# Patient Record
Sex: MALE | Race: WHITE | Employment: FULL TIME | URBAN - METROPOLITAN AREA
[De-identification: names, ages, dates, MRNs, and addresses within clinical notes are randomized per-mention and may not be internally consistent; named-entity substitution may affect disease eponyms.]

---

## 2017-08-04 ENCOUNTER — OFFICE VISIT (OUTPATIENT)
Dept: URGENT CARE | Facility: CLINIC | Age: 42
End: 2017-08-04
Payer: COMMERCIAL

## 2017-08-04 PROCEDURE — 99213 OFFICE O/P EST LOW 20 MIN: CPT

## 2017-08-09 ENCOUNTER — ALLSCRIPTS OFFICE VISIT (OUTPATIENT)
Dept: OTHER | Facility: OTHER | Age: 42
End: 2017-08-09

## 2017-08-09 ENCOUNTER — APPOINTMENT (OUTPATIENT)
Dept: RADIOLOGY | Facility: MEDICAL CENTER | Age: 42
End: 2017-08-09
Payer: COMMERCIAL

## 2017-08-09 ENCOUNTER — TRANSCRIBE ORDERS (OUTPATIENT)
Dept: URGENT CARE | Facility: MEDICAL CENTER | Age: 42
End: 2017-08-09

## 2017-08-09 DIAGNOSIS — R07.81 RIB PAIN: Primary | ICD-10-CM

## 2017-08-09 PROCEDURE — 71101 X-RAY EXAM UNILAT RIBS/CHEST: CPT

## 2017-08-15 ENCOUNTER — ALLSCRIPTS OFFICE VISIT (OUTPATIENT)
Dept: OTHER | Facility: OTHER | Age: 42
End: 2017-08-15

## 2017-08-16 ENCOUNTER — ALLSCRIPTS OFFICE VISIT (OUTPATIENT)
Dept: OTHER | Facility: OTHER | Age: 42
End: 2017-08-16

## 2018-01-09 NOTE — PROGRESS NOTES
Assessment    1  Muscle strain of scapular region, right, initial encounter (840 9) (A21 173I)    Plan  Muscle strain of scapular region, right, initial encounter    · * XR RIBS RIGHT W PA CHEST MIN 3 VIEWS; Status:Active; Requested for:[id number];    · *1 -  [facility] ([facility] )  Co-Management  *  Status: Active  Requested for: 08Tez0623  Care Summary provided  : Yes    Discussion/Summary    Right posterior scapula pain  COntinue NSAID--such as Advil or Ibuprfen  Chief Complaint  Pain under right shoulder blade for about 6 weeks  History of Present Illness  HPI: Pt developed pain of right scapular region 6 weeks ago  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, 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  AFter 5 weeks, the pain is still present  Over the past 2 weeks, he stopped doing this job, however, 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  He has taken advil , Ibuprofen, without relief of symptoms  Review of Systems    Constitutional: no fever or chills, feels well, no tiredness, no recent weight loss or weight gain  Respiratory: no complaints of shortness of breath, no wheezing or cough, no dyspnea on exertion, no orthopnea or PND  Musculoskeletal: Pain posterior scapular region  Integumentary: no complaints of skin rash or lesion, no itching or dry skin, no skin wounds  Neurological: no complaints of headache, no confusion, no numbness or tingling, no dizziness or fainting  Active Problems    1  Acute bronchitis (466 0) (J20 9)   2  Erythema migrans (Lyme disease) (088 81) (A69 20)    Past Medical History    1  History of Arthralgia of wrist, left (719 43) (M25 532)   2  History of Cellulitis (682 9) (L03 90)   3  History of Fracture, metacarpal (815 00) (S62 309A)   4  History of allergic rhinitis (V12 69) (Z87 09)   5  History of bronchitis (V12 69) (Z87 09)   6  History of Pain in joint of left wrist (719 43) (M25 532)   7   Personal history of dislocation of shoulder (V13 59) (Z87 39)   8  History of Rib pain on right side (786 50) (R07 81)   9  History of Ulnar impaction syndrome (148 54) (R65 334)  Active Problems And Past Medical History Reviewed: The active problems and past medical history were reviewed and updated today  Family History  Mother    1  Family history of diabetes mellitus (V18 0) (Z83 3)  Family History    2  Family history of Prostate cancer  Family History Reviewed: The family history was reviewed and updated today  Social History    · History of Former smoker (Q45 01) (Z22 104)   · Never a smoker  The social history was reviewed and updated today  The social history was reviewed and is unchanged  Surgical History    1  History of Hand Repair   2  History of Hand Surgery  Surgical History Reviewed: The surgical history was reviewed and updated today  Current Meds   1  ProAir  (90 Base) MCG/ACT Inhalation Aerosol Solution; INHALE 1 TO 2 PUFFS   EVERY 4 TO 6 HOURS AS NEEDED; Therapy: [id number] to (Complete:18Aug2017)  Requested for: 40Iyb4054; Last   Rx:[id number] Ordered    The medication list was reviewed and updated today  Allergies    1  hydrocodone   2  Hydrocodone-Acetaminophen CAPS    Vitals   Recorded: 09Aug2017 05:38PM   Temperature 96 9 F   Heart Rate 83   Systolic 351   Diastolic 78   Height 5 ft 8 in   Weight 191 lb 8 0 oz   BMI Calculated 29 12   BSA Calculated 2 01   O2 Saturation 100     Physical Exam  Right Shoulder: Appearance: Normal  Tenderness: None  Motor: Normal  Special Tests: negative Painful Arc, negative Drop Arm test and negative Bear Hug test      Constitutional   General appearance: No acute distress, well appearing and well nourished  Eyes   Conjunctiva and lids: No swelling, erythema, or discharge  Pupils and irises: Equal, round and reactive to light  Ears, Nose, Mouth, and Throat   Oropharynx: Normal with no erythema, edema, exudate or lesions  Pulmonary   Respiratory effort: No increased work of breathing or signs of respiratory distress  Auscultation of lungs: Clear to auscultation, equal breath sounds bilaterally, no wheezes, no rales, no rhonci  Cardiovascular   Auscultation of heart: Normal rate and rhythm, normal S1 and S2, without murmurs  Abdomen   Abdomen: Non-tender, no masses  Lymphatic   Palpation of lymph nodes in neck: No lymphadenopathy  Musculoskeletal   Gait and station: Normal     Skin   Skin and subcutaneous tissue: Normal without rashes or lesions  Psychiatric   Orientation to person, place and time: Normal     Mood and affect: Normal          Results/Data  PHQ-9 Adult Depression Screening [id number] 05:41PM User, [employer]     Test Name Result Flag Reference   PHQ-9 Adult Depression Score 0     Over the last two weeks, how often have you been bothered by any of the following problems? Little interest or pleasure in doing things: Not at all - 0  Feeling down, depressed, or hopeless: Not at all - 0  Trouble falling or staying asleep, or sleeping too much: Not at all - 0  Feeling tired or having little energy: Not at all - 0  Poor appetite or over eating: Not at all - 0  Feeling bad about yourself - or that you are a failure or have let yourself or your family down: Not at all - 0  Trouble concentrating on things, such as reading the newspaper or watching television: Not at all - 0  Moving or speaking so slowly that other people could have noticed   Or the opposite -  being so fidgety or restless that you have been moving around a lot more than usual: Not at all - 0  Thoughts that you would be better off dead, or of hurting yourself in some way: Not at all - 0   PHQ-9 Adult Depression Screening Negative     PHQ-9 Difficulty Level Not difficult at all     PHQ-9 Severity No Depression         Signatures   Electronically signed by : Modesta De Santiago NP; Aug  9 2017  5:52PM EST                       (Author)    Electronically signed by : Tiffany Saucedo MD; Aug 10 2017  7:01AM EST                       (Co-author)

## 2018-01-13 VITALS
BODY MASS INDEX: 28.97 KG/M2 | OXYGEN SATURATION: 98 % | HEIGHT: 68 IN | SYSTOLIC BLOOD PRESSURE: 122 MMHG | HEART RATE: 62 BPM | DIASTOLIC BLOOD PRESSURE: 84 MMHG | WEIGHT: 191.13 LBS | TEMPERATURE: 97.9 F

## 2018-01-13 VITALS
HEIGHT: 68 IN | HEART RATE: 83 BPM | SYSTOLIC BLOOD PRESSURE: 120 MMHG | WEIGHT: 191.5 LBS | TEMPERATURE: 96.9 F | DIASTOLIC BLOOD PRESSURE: 78 MMHG | OXYGEN SATURATION: 100 % | BODY MASS INDEX: 29.02 KG/M2

## 2018-01-14 VITALS
HEART RATE: 57 BPM | DIASTOLIC BLOOD PRESSURE: 88 MMHG | BODY MASS INDEX: 29.03 KG/M2 | WEIGHT: 191.56 LBS | HEIGHT: 68 IN | SYSTOLIC BLOOD PRESSURE: 128 MMHG

## 2018-01-15 NOTE — RESULT NOTES
Verified Results  (1) LYME ANTIBODY PROFILE W/REFLEX TO WESTERN BLOT [id number] 08:53AM Megan Andres Order Number: [id number]     Test Name Result Flag Reference   LYME IGG 0 27  0 00-0 79   NEGATIVE(0 00-0 79)-Absence of detectable Borrelia IgG Antibodies  A negative result does not exclude the possibility of Borrelia infection  If early Lyme disease is suspected,a second sample should be collected & tested 4 weeks after initial testing  LYME IGM 1 92 H 0 00-0 79   POSITIVE (> or = 1 20) - Presence of Borrelia IgM Antibodies  Current testing guidelines recommend that all positive samples be supplemented by further testing  Sample forwarded to reference lab for western blot assay         Discussion/Summary   Jennifer is positive for exposure to lyme waiting for the western blot for confirmation continue with the doxycyline

## 2018-01-16 NOTE — RESULT NOTES
Verified Results  (1) LYME ANTIBODY PROFILE W/REFLEX TO WESTERN BLOT [id number] 08:53AM Marc Vinson Order Number: [id number]     Test Name Result Flag Reference   LYME IGG 0 27  0 00-0 79   NEGATIVE(0 00-0 79)-Absence of detectable Borrelia IgG Antibodies  A negative result does not exclude the possibility of Borrelia infection  If early Lyme disease is suspected,a second sample should be collected & tested 4 weeks after initial testing  LYME IGM 1 92 H 0 00-0 79   POSITIVE (> or = 1 20) - Presence of Borrelia IgM Antibodies  Current testing guidelines recommend that all positive samples be supplemented by further testing  Sample forwarded to reference lab for western blot assay  LYME 18 KD IGG Absent     LYME 23 KD IGG Absent     LYME 28 KD IGG Absent     LYME 30 KD IGG Absent     LYME 39 KD IGG Absent     LYME 39 KD IGM Absent     LYME 41 KD IGG Present A    LYME 45 KD IGG Absent     LYME 58 KD IGG Absent     LYME 66 KD IGG Absent     LYME 93 KD IGG Absent     LYME 23 KD IGM Present A    LYME 41 KD IGM Absent     LYME IGG WB INTERP  Negative     Positive: 5 of the following                               Borrelia-specific bands:                               18,23,28,30,39,41,45,58,                               66, and 93  Negative: No bands or banding                               patterns which do not                               meet positive criteria  LYME IGM WB INTERP  Negative     Note: An equivocal or positive EIA result followed by a negativeWestern Blot result is considered NEGATIVE  An equivocal or positiveEIA result followed by a positive Western Blot is considered POSITIVEby the CDC  Positive: 2 of the following bands: 23,39 or 41Negative: No bands or banding patterns which do not meet positivecriteria  Criteria for positivity are those recommended by CDC/ASTPHLD p23=Osp C, x48=kcayiabcmWzab:Sera from individuals with the following may cross react in theLyme Western Blot assays: other spirochetal diseases (periodontaldisease, leptospirosis, relapsing fever, yaws, and pinta);connective autoimmune (Rheumatoid Arthritis and Systemic LupusErythematosus and also individuals with Antinuclear Antibody);other infections COFFEE [facility] Spotted Fever; Ar-Barr Virus,and Cytomegalovirus)  Performed at:  [address]  [id number]  : Minal Josue MD, Phone:  [phone]       Discussion/Summary   Thest western blot is negative   I would suggest recheck in 4 weeks so far the Lyme is negative overall would complete the antibiotics

## 2018-01-20 ENCOUNTER — OFFICE VISIT (OUTPATIENT)
Dept: URGENT CARE | Facility: CLINIC | Age: 43
End: 2018-01-20
Payer: COMMERCIAL

## 2018-01-20 PROCEDURE — G0382 LEV 3 HOSP TYPE B ED VISIT: HCPCS

## 2018-01-21 NOTE — PROGRESS NOTES
Assessment   1  Pharyngitis (462) (J02 9)    Plan   Pharyngitis    · Start: Amoxicillin 500 MG Oral Capsule; TAKE 1 CAPSULE 3 TIMES DAILY   · Start: ProAir  (90 Base) MCG/ACT Inhalation Aerosol Solution; INHALE 1 PUFFS    4 times daily    Discussion/Summary   Discussion Summary:    I discussed pharyngitis and the use of amoxicillin patient requests ProAir being refilled patient is to see his family doctor in 1-2 days if not improved  Medication Side Effects Reviewed: Possible side effects of new medications were reviewed with the patient/guardian today  Understands and agrees with treatment plan: The treatment plan was reviewed with the patient/guardian  The patient/guardian understands and agrees with the treatment plan    Counseling Documentation With Imm: The patient was counseled regarding instructions for management,-- prognosis  Follow Up Instructions: Follow Up with your Primary Care Provider in 1-2 days  If your symptoms worsen, go to the nearest [place] [address] Emergency Department  Chief Complaint   1  Sore Throat  Chief Complaint Free Text Note Form: Pt c/o a sore throat for two days  History of Present Illness   HPI: Patient is a 45-year-old male with sore throat for approximately 2 days  He has intermittent chills but no fever per se 2 weeks ago he sustained a chest cold when he used his albuterol  He has a history of exercise-induced asthma  He has no ear pain  He has mild cough  Hospital Based Practices Required Assessment:      Pain Assessment      the patient states they have pain  The pain is located in the Throat  (on a scale of 0 to 10, the patient rates the pain at 4 )      Abuse And Domestic Violence Screen       Yes, the patient is safe at home  -- The patient states no one is hurting them  Depression And Suicide Screen  No, the patient has not had thoughts of hurting themself  No, the patient has not felt depressed in the past 7 days        Prefered Language is  [place]  Primary Language is  English  Readiness To Learn: Receptive  Barriers To Learning: none  Education Completed: disease/condition,-- medications-- and-- treatment/procedure      Teaching Method: verbal      Person Taught: patient      Evaluation Of Learning: verbalized/demonstrated understanding      Review of Systems   Focused-Male:      Constitutional: feeling poorly,-- chills-- and-- feeling tired, but-- no fever  ENT: sore throat, but-- no earache,-- no nosebleeds,-- no hearing loss,-- no nasal discharge-- and-- no hoarseness  Cardiovascular: no complaints of slow or fast heart rate, no chest pain, no palpitations, no leg claudication or lower extremity edema  Respiratory: cough-- and-- wheezing, but-- no shortness of breath,-- no orthopnea,-- no shortness of breath during exertion-- and-- no PND  Gastrointestinal: no abdominal pain,-- no nausea,-- no vomiting,-- no constipation,-- no diarrhea-- and-- no blood in stools  Musculoskeletal: no complaints of arthralgia, no myalgia, no joint swelling or stiffness, no limb pain or swelling  Integumentary: no complaints of skin rash or lesion, no itching or dry skin, no skin wounds  Neurological: no complaints of headache, no confusion, no numbness or tingling, no dizziness or fainting  ROS Reviewed:    ROS reviewed  Active Problems   1  Acute bronchitis (466 0) (J20 9)  2  Anal itching (698 0) (L29 0)  3  Erythema migrans (Lyme disease) (088 81) (A69 20)  4  Muscle strain of scapular region, right, initial encounter (840 9) (S46 911A)  5  Neuropathy (355 9) (G62 9)  6  Right shoulder pain (719 41) (M25 511)    Past Medical History   1  History of Arthralgia of wrist, left (719 43) (M25 532)  2  History of Cellulitis (682 9) (L03 90)  3  History of Fracture, metacarpal (815 00) (S62 309A)  4  History of allergic rhinitis (V12 69) (Z87 09)  5  History of bronchitis (V12 69) (Z87 09)  6  History of Pain in joint of left wrist (719 43) (M22 532)  7  Personal history of dislocation of shoulder (V13 59) (Z87 39)  8  History of Rib pain on right side (786 50) (R07 81)  9  History of Ulnar impaction syndrome (911 21) (Y94 169)  Active Problems And Past Medical History Reviewed: The active problems and past medical history were reviewed and updated today  Family History   Mother   1  Family history of diabetes mellitus (V18 0) (Z83 3)  Family History   2  Family history of Prostate cancer  Family History Reviewed: The family history was reviewed and updated today  Social History    · History of Former smoker (D05 33) (D23 076)   · Never a smoker  Social History Reviewed: The social history was reviewed and updated today  Surgical History   1  History of Hand Repair  2  History of Hand Surgery  Surgical History Reviewed: The surgical history was reviewed and updated today  Allergies   1  hydrocodone  2  Hydrocodone-Acetaminophen CAPS    Vitals   Signs   Recorded: 20Jan2018 09:25AM   Temperature: 98 F  Heart Rate: 69  Respiration: 18  Systolic: 208  Diastolic: 68  O2 Saturation: 97    Physical Exam        Constitutional      General appearance: No acute distress, well appearing and well nourished  Eyes      Conjunctiva and lids: No swelling, erythema, or discharge  Ears, Nose, Mouth, and Throat      External inspection of ears and nose: Normal        Otoscopic examination: Tympanic membrance translucent with normal light reflex  Canals patent without erythema  Nasal mucosa, septum, and turbinates: Normal without edema or erythema  Oropharynx: Abnormal  -- anterior pillars and uvula are reddened and injected without exudate retropharynx shows lymphoid hypertrophy with postnasal drip  Pulmonary      Auscultation of lungs: Abnormal  -- occasional rhonchi scattered at both bases        Cardiovascular      Auscultation of heart: Normal rate and rhythm, normal S1 and S2, without murmurs  Skin      Skin and subcutaneous tissue: Normal without rashes or lesions         Psychiatric      Orientation to person, place and time: Normal        Mood and affect: Normal        Signatures    Electronically signed by : Bony Bond DO; Jan 20 2018  9:42AM EST                       (Author)     Electronically signed by : Bony Bond DO; Jan 20 2018  3:46PM EST                       (Author)

## 2018-01-23 VITALS
SYSTOLIC BLOOD PRESSURE: 134 MMHG | HEART RATE: 69 BPM | DIASTOLIC BLOOD PRESSURE: 68 MMHG | OXYGEN SATURATION: 97 % | RESPIRATION RATE: 18 BRPM | TEMPERATURE: 98 F

## 2019-12-05 ENCOUNTER — OFFICE VISIT (OUTPATIENT)
Dept: FAMILY MEDICINE CLINIC | Facility: CLINIC | Age: 44
End: 2019-12-05
Payer: COMMERCIAL

## 2019-12-05 VITALS
RESPIRATION RATE: 16 BRPM | DIASTOLIC BLOOD PRESSURE: 78 MMHG | SYSTOLIC BLOOD PRESSURE: 128 MMHG | OXYGEN SATURATION: 98 % | BODY MASS INDEX: 34.31 KG/M2 | TEMPERATURE: 98.5 F | HEIGHT: 68 IN | HEART RATE: 56 BPM | WEIGHT: 226.4 LBS

## 2019-12-05 DIAGNOSIS — Z13.1 SCREENING FOR DIABETES MELLITUS: ICD-10-CM

## 2019-12-05 DIAGNOSIS — Z00.00 HEALTH MAINTENANCE EXAMINATION: Primary | ICD-10-CM

## 2019-12-05 DIAGNOSIS — M95.0 ACQUIRED NASAL DEFORMITY EXCLUDING DEVIATED SEPTUM: ICD-10-CM

## 2019-12-05 DIAGNOSIS — J45.990 EXERCISE-INDUCED ASTHMA: ICD-10-CM

## 2019-12-05 DIAGNOSIS — Z13.220 NEED FOR LIPID SCREENING: ICD-10-CM

## 2019-12-05 PROCEDURE — 99396 PREV VISIT EST AGE 40-64: CPT | Performed by: FAMILY MEDICINE

## 2019-12-05 RX ORDER — CETIRIZINE HYDROCHLORIDE 10 MG/1
10 TABLET ORAL DAILY
COMMUNITY
End: 2021-12-03

## 2019-12-05 RX ORDER — DIPHENHYDRAMINE HCL 25 MG
25 TABLET ORAL EVERY 6 HOURS PRN
COMMUNITY
End: 2021-12-03

## 2019-12-05 RX ORDER — ALBUTEROL SULFATE 90 UG/1
2 AEROSOL, METERED RESPIRATORY (INHALATION) EVERY 6 HOURS PRN
Qty: 1 INHALER | Refills: 5 | Status: SHIPPED | OUTPATIENT
Start: 2019-12-05 | End: 2021-12-03 | Stop reason: SDUPTHER
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
## 2019-12-05 NOTE — PROGRESS NOTES
BMI Counseling: Body mass index is 34 42 kg/m²  The BMI is above normal  Nutrition recommendations include 3-5 servings of fruits/vegetables daily  Exercise recommendations include exercising 3-5 times per week

## 2019-12-07 ENCOUNTER — LAB (OUTPATIENT)
Dept: LAB | Facility: HOSPITAL | Age: 44
End: 2019-12-07
Payer: COMMERCIAL

## 2019-12-07 DIAGNOSIS — Z13.1 SCREENING FOR DIABETES MELLITUS: ICD-10-CM

## 2019-12-07 DIAGNOSIS — Z13.220 NEED FOR LIPID SCREENING: ICD-10-CM

## 2019-12-07 LAB
ALBUMIN SERPL BCP-MCNC: 4.6 G/DL (ref 3.5–5.7)
ALP SERPL-CCNC: 46 U/L (ref 40–150)
ALT SERPL W P-5'-P-CCNC: 28 U/L (ref 7–52)
ANION GAP SERPL CALCULATED.3IONS-SCNC: 5 MMOL/L (ref 4–13)
AST SERPL W P-5'-P-CCNC: 26 U/L (ref 13–39)
BILIRUB SERPL-MCNC: 0.7 MG/DL (ref 0.2–1)
BUN SERPL-MCNC: 20 MG/DL (ref 7–25)
CALCIUM SERPL-MCNC: 9.2 MG/DL (ref 8.6–10.5)
CHLORIDE SERPL-SCNC: 105 MMOL/L (ref 98–107)
CHOLEST SERPL-MCNC: 154 MG/DL (ref 0–200)
CO2 SERPL-SCNC: 31 MMOL/L (ref 21–31)
CREAT SERPL-MCNC: 1.06 MG/DL (ref 0.7–1.3)
GFR SERPL CREATININE-BSD FRML MDRD: 86 ML/MIN/1.73SQ M
GLUCOSE P FAST SERPL-MCNC: 86 MG/DL (ref 65–99)
HDLC SERPL-MCNC: 48 MG/DL
LDLC SERPL CALC-MCNC: 96 MG/DL (ref 0–100)
NONHDLC SERPL-MCNC: 106 MG/DL
POTASSIUM SERPL-SCNC: 4 MMOL/L (ref 3.5–5.5)
PROT SERPL-MCNC: 6.7 G/DL (ref 6.4–8.9)
SODIUM SERPL-SCNC: 141 MMOL/L (ref 134–143)
TRIGL SERPL-MCNC: 51 MG/DL (ref 44–166)

## 2019-12-07 PROCEDURE — 80061 LIPID PANEL: CPT

## 2019-12-07 PROCEDURE — 80053 COMPREHEN METABOLIC PANEL: CPT

## 2019-12-07 PROCEDURE — 36415 COLL VENOUS BLD VENIPUNCTURE: CPT

## 2020-02-04 ENCOUNTER — HOSPITAL ENCOUNTER (OUTPATIENT)
Dept: CT IMAGING | Facility: HOSPITAL | Age: 45
Discharge: HOME | End: 2020-02-04
Payer: COMMERCIAL

## 2020-02-04 DIAGNOSIS — J34.89 NASAL OBSTRUCTION: ICD-10-CM

## 2020-02-04 DIAGNOSIS — J34.2 NASAL SEPTAL DEVIATION: ICD-10-CM

## 2020-02-04 DIAGNOSIS — J34.89 SINUS PRESSURE: ICD-10-CM

## 2020-02-04 DIAGNOSIS — J34.3 NASAL TURBINATE HYPERTROPHY: ICD-10-CM

## 2020-02-04 DIAGNOSIS — R51.9 FACIAL PAIN: ICD-10-CM

## 2020-02-04 DIAGNOSIS — M95.0 SADDLE NOSE DEFORMITY, ACQUIRED: ICD-10-CM

## 2020-02-04 PROCEDURE — 70486 CT MAXILLOFACIAL W/O DYE: CPT

## 2020-04-17 ENCOUNTER — TELEMEDICINE (OUTPATIENT)
Dept: FAMILY MEDICINE CLINIC | Facility: CLINIC | Age: 45
End: 2020-04-17
Payer: COMMERCIAL

## 2020-04-17 VITALS — TEMPERATURE: 99.3 F

## 2020-04-17 DIAGNOSIS — Z20.828 EXPOSURE TO SARS-ASSOCIATED CORONAVIRUS: ICD-10-CM

## 2020-04-17 DIAGNOSIS — Z20.828 EXPOSURE TO SARS-ASSOCIATED CORONAVIRUS: Primary | ICD-10-CM

## 2020-04-17 DIAGNOSIS — R50.9 COUGH WITH FEVER: ICD-10-CM

## 2020-04-17 DIAGNOSIS — R05.9 COUGH WITH FEVER: ICD-10-CM

## 2020-04-17 PROBLEM — Z13.220 NEED FOR LIPID SCREENING: Status: RESOLVED | Noted: 2019-12-05 | Resolved: 2020-04-17

## 2020-04-17 PROBLEM — Z13.1 SCREENING FOR DIABETES MELLITUS: Status: RESOLVED | Noted: 2019-12-05 | Resolved: 2020-04-17

## 2020-04-17 PROBLEM — Z00.00 HEALTH MAINTENANCE EXAMINATION: Status: RESOLVED | Noted: 2019-12-05 | Resolved: 2020-04-17

## 2020-04-17 PROCEDURE — 99214 OFFICE O/P EST MOD 30 MIN: CPT | Performed by: FAMILY MEDICINE

## 2020-04-17 PROCEDURE — 87635 SARS-COV-2 COVID-19 AMP PRB: CPT

## 2020-04-18 LAB — SARS-COV-2 RNA SPEC QL NAA+PROBE: DETECTED

## 2020-04-20 ENCOUNTER — TELEMEDICINE (OUTPATIENT)
Dept: FAMILY MEDICINE CLINIC | Facility: CLINIC | Age: 45
End: 2020-04-20
Payer: COMMERCIAL

## 2020-04-20 DIAGNOSIS — U07.1 COVID-19: Primary | ICD-10-CM

## 2020-04-20 PROCEDURE — 99212 OFFICE O/P EST SF 10 MIN: CPT | Performed by: FAMILY MEDICINE

## 2020-04-22 ENCOUNTER — TELEMEDICINE (OUTPATIENT)
Dept: FAMILY MEDICINE CLINIC | Facility: CLINIC | Age: 45
End: 2020-04-22
Payer: COMMERCIAL

## 2020-04-22 VITALS — HEIGHT: 68 IN | BODY MASS INDEX: 34.71 KG/M2 | WEIGHT: 229 LBS

## 2020-04-22 DIAGNOSIS — U07.1 COVID-19: Primary | ICD-10-CM

## 2020-04-22 PROCEDURE — 99213 OFFICE O/P EST LOW 20 MIN: CPT | Performed by: FAMILY MEDICINE

## 2020-04-24 ENCOUNTER — TELEMEDICINE (OUTPATIENT)
Dept: FAMILY MEDICINE CLINIC | Facility: CLINIC | Age: 45
End: 2020-04-24
Payer: COMMERCIAL

## 2020-04-24 DIAGNOSIS — U07.1 COVID-19: Primary | ICD-10-CM

## 2020-04-24 PROCEDURE — 99213 OFFICE O/P EST LOW 20 MIN: CPT | Performed by: PHYSICIAN ASSISTANT

## 2020-05-07 ENCOUNTER — TELEMEDICINE (OUTPATIENT)
Dept: FAMILY MEDICINE CLINIC | Facility: CLINIC | Age: 45
End: 2020-05-07
Payer: COMMERCIAL

## 2020-05-07 VITALS — WEIGHT: 229 LBS | HEIGHT: 68 IN | BODY MASS INDEX: 34.71 KG/M2 | HEART RATE: 98 BPM

## 2020-05-07 DIAGNOSIS — J45.990 EXERCISE-INDUCED ASTHMA: Primary | ICD-10-CM

## 2020-05-07 DIAGNOSIS — M95.0 ACQUIRED NASAL DEFORMITY EXCLUDING DEVIATED SEPTUM: ICD-10-CM

## 2020-05-07 PROBLEM — R05.9 COUGH WITH FEVER: Status: RESOLVED | Noted: 2020-04-17 | Resolved: 2020-05-07

## 2020-05-07 PROBLEM — R50.9 COUGH WITH FEVER: Status: RESOLVED | Noted: 2020-04-17 | Resolved: 2020-05-07

## 2020-05-07 PROCEDURE — 99213 OFFICE O/P EST LOW 20 MIN: CPT | Performed by: FAMILY MEDICINE

## 2020-05-14 ENCOUNTER — TELEMEDICINE (OUTPATIENT)
Dept: FAMILY MEDICINE CLINIC | Facility: CLINIC | Age: 45
End: 2020-05-14
Payer: COMMERCIAL

## 2020-05-14 ENCOUNTER — TRANSCRIBE ORDERS (OUTPATIENT)
Dept: LAB | Facility: CLINIC | Age: 45
End: 2020-05-14

## 2020-05-14 ENCOUNTER — APPOINTMENT (OUTPATIENT)
Dept: LAB | Facility: CLINIC | Age: 45
End: 2020-05-14
Payer: COMMERCIAL

## 2020-05-14 VITALS — WEIGHT: 229 LBS | BODY MASS INDEX: 34.71 KG/M2 | HEIGHT: 68 IN

## 2020-05-14 DIAGNOSIS — J45.990 EXERCISE-INDUCED ASTHMA: ICD-10-CM

## 2020-05-14 DIAGNOSIS — J34.3 NASAL TURBINATE HYPERTROPHY: ICD-10-CM

## 2020-05-14 DIAGNOSIS — J34.89 NASAL OBSTRUCTION: ICD-10-CM

## 2020-05-14 DIAGNOSIS — M95.0 ACQUIRED NASAL DEFORMITY EXCLUDING DEVIATED SEPTUM: ICD-10-CM

## 2020-05-14 DIAGNOSIS — J34.2 NASAL SEPTAL DEVIATION: ICD-10-CM

## 2020-05-14 DIAGNOSIS — M95.0 ACQUIRED NASAL DEFORMITY EXCLUDING DEVIATED SEPTUM: Primary | ICD-10-CM

## 2020-05-14 LAB
ANION GAP SERPL CALCULATED.3IONS-SCNC: 8 MMOL/L (ref 4–13)
BASOPHILS # BLD AUTO: 0.04 THOUSANDS/ΜL (ref 0–0.1)
BASOPHILS NFR BLD AUTO: 1 % (ref 0–1)
BUN SERPL-MCNC: 20 MG/DL (ref 5–25)
CALCIUM SERPL-MCNC: 8.7 MG/DL (ref 8.3–10.1)
CHLORIDE SERPL-SCNC: 104 MMOL/L (ref 100–108)
CO2 SERPL-SCNC: 27 MMOL/L (ref 21–32)
CREAT SERPL-MCNC: 1.05 MG/DL (ref 0.6–1.3)
EOSINOPHIL # BLD AUTO: 0.25 THOUSAND/ΜL (ref 0–0.61)
EOSINOPHIL NFR BLD AUTO: 7 % (ref 0–6)
ERYTHROCYTE [DISTWIDTH] IN BLOOD BY AUTOMATED COUNT: 12.9 % (ref 11.6–15.1)
GFR SERPL CREATININE-BSD FRML MDRD: 87 ML/MIN/1.73SQ M
GLUCOSE SERPL-MCNC: 95 MG/DL (ref 65–140)
HCT VFR BLD AUTO: 42 % (ref 36.5–49.3)
HGB BLD-MCNC: 14.4 G/DL (ref 12–17)
IMM GRANULOCYTES # BLD AUTO: 0.01 THOUSAND/UL (ref 0–0.2)
IMM GRANULOCYTES NFR BLD AUTO: 0 % (ref 0–2)
LYMPHOCYTES # BLD AUTO: 0.96 THOUSANDS/ΜL (ref 0.6–4.47)
LYMPHOCYTES NFR BLD AUTO: 29 % (ref 14–44)
MCH RBC QN AUTO: 29 PG (ref 26.8–34.3)
MCHC RBC AUTO-ENTMCNC: 34.3 G/DL (ref 31.4–37.4)
MCV RBC AUTO: 85 FL (ref 82–98)
MONOCYTES # BLD AUTO: 0.46 THOUSAND/ΜL (ref 0.17–1.22)
MONOCYTES NFR BLD AUTO: 14 % (ref 4–12)
NEUTROPHILS # BLD AUTO: 1.64 THOUSANDS/ΜL (ref 1.85–7.62)
NEUTS SEG NFR BLD AUTO: 49 % (ref 43–75)
NRBC BLD AUTO-RTO: 0 /100 WBCS
PLATELET # BLD AUTO: 169 THOUSANDS/UL (ref 149–390)
PMV BLD AUTO: 10.4 FL (ref 8.9–12.7)
POTASSIUM SERPL-SCNC: 3.9 MMOL/L (ref 3.5–5.3)
RBC # BLD AUTO: 4.96 MILLION/UL (ref 3.88–5.62)
SODIUM SERPL-SCNC: 139 MMOL/L (ref 136–145)
WBC # BLD AUTO: 3.36 THOUSAND/UL (ref 4.31–10.16)

## 2020-05-14 PROCEDURE — 36415 COLL VENOUS BLD VENIPUNCTURE: CPT

## 2020-05-14 PROCEDURE — 99215 OFFICE O/P EST HI 40 MIN: CPT | Performed by: FAMILY MEDICINE

## 2020-05-14 PROCEDURE — 80048 BASIC METABOLIC PNL TOTAL CA: CPT

## 2020-05-14 PROCEDURE — 85025 COMPLETE CBC W/AUTO DIFF WBC: CPT

## 2020-05-14 PROCEDURE — U0003 INFECTIOUS AGENT DETECTION BY NUCLEIC ACID (DNA OR RNA); SEVERE ACUTE RESPIRATORY SYNDROME CORONAVIRUS 2 (SARS-COV-2) (CORONAVIRUS DISEASE [COVID-19]), AMPLIFIED PROBE TECHNIQUE, MAKING USE OF HIGH THROUGHPUT TECHNOLOGIES AS DESCRIBED BY CMS-2020-01-R: HCPCS

## 2020-05-15 LAB — SARS-COV-2 RNA RESP QL NAA+PROBE: NEGATIVE

## 2020-05-20 ENCOUNTER — ANESTHESIA EVENT (OUTPATIENT)
Dept: PERIOP | Facility: HOSPITAL | Age: 45
End: 2020-05-20
Payer: COMMERCIAL

## 2020-05-21 ENCOUNTER — HOSPITAL ENCOUNTER (OUTPATIENT)
Facility: HOSPITAL | Age: 45
Setting detail: OUTPATIENT SURGERY
Discharge: HOME | End: 2020-05-21
Attending: OTOLARYNGOLOGY | Admitting: OTOLARYNGOLOGY
Payer: COMMERCIAL

## 2020-05-21 ENCOUNTER — ANESTHESIA (OUTPATIENT)
Dept: PERIOP | Facility: HOSPITAL | Age: 45
End: 2020-05-21
Payer: COMMERCIAL

## 2020-05-21 VITALS
HEIGHT: 68 IN | BODY MASS INDEX: 34.71 KG/M2 | OXYGEN SATURATION: 94 % | HEART RATE: 53 BPM | SYSTOLIC BLOOD PRESSURE: 155 MMHG | WEIGHT: 229 LBS | TEMPERATURE: 97 F | RESPIRATION RATE: 18 BRPM | DIASTOLIC BLOOD PRESSURE: 78 MMHG

## 2020-05-21 DIAGNOSIS — M95.0 SADDLE NOSE DEFORMITY, ACQUIRED: ICD-10-CM

## 2020-05-21 DIAGNOSIS — J34.89 NASAL OBSTRUCTION: Chronic | ICD-10-CM

## 2020-05-21 DIAGNOSIS — J34.2 NASAL SEPTAL DEVIATION: ICD-10-CM

## 2020-05-21 DIAGNOSIS — J34.3 NASAL TURBINATE HYPERTROPHY: ICD-10-CM

## 2020-05-21 DIAGNOSIS — M95.0 ACQUIRED NASAL DEFORMITY EXCLUDING DEVIATED SEPTUM: Primary | ICD-10-CM

## 2020-05-21 PROCEDURE — C1762 CONN TISS, HUMAN(INC FASCIA): HCPCS | Performed by: OTOLARYNGOLOGY

## 2020-05-21 PROCEDURE — 30520 REPAIR OF NASAL SEPTUM: CPT | Performed by: OTOLARYNGOLOGY

## 2020-05-21 PROCEDURE — 30140 RESECT INFERIOR TURBINATE: CPT | Performed by: OTOLARYNGOLOGY

## 2020-05-21 PROCEDURE — 30465 REPAIR NASAL STENOSIS: CPT | Performed by: OTOLARYNGOLOGY

## 2020-05-21 DEVICE — GRAFT COSTAL CARTILAGE MED 30MM X 3CM  10-30MM THCK: Type: IMPLANTABLE DEVICE | Site: NOSE | Status: FUNCTIONAL

## 2020-05-21 RX ORDER — SODIUM CHLORIDE, SODIUM LACTATE, POTASSIUM CHLORIDE, CALCIUM CHLORIDE 600; 310; 30; 20 MG/100ML; MG/100ML; MG/100ML; MG/100ML
75 INJECTION, SOLUTION INTRAVENOUS CONTINUOUS
Status: DISCONTINUED | OUTPATIENT
Start: 2020-05-21 | End: 2020-05-21 | Stop reason: HOSPADM

## 2020-05-21 RX ORDER — GLYCOPYRROLATE 0.2 MG/ML
INJECTION INTRAMUSCULAR; INTRAVENOUS AS NEEDED
Status: DISCONTINUED | OUTPATIENT
Start: 2020-05-21 | End: 2020-05-21 | Stop reason: SURG

## 2020-05-21 RX ORDER — OXYCODONE HYDROCHLORIDE 5 MG/1
5 TABLET ORAL EVERY 4 HOURS PRN
Qty: 10 TABLET | Refills: 0 | Status: SHIPPED | OUTPATIENT
Start: 2020-05-21 | End: 2020-05-31

## 2020-05-21 RX ORDER — HYDROMORPHONE HCL/PF 1 MG/ML
0.2 SYRINGE (ML) INJECTION
Status: DISCONTINUED | OUTPATIENT
Start: 2020-05-21 | End: 2020-05-21 | Stop reason: HOSPADM

## 2020-05-21 RX ORDER — DEXAMETHASONE SODIUM PHOSPHATE 10 MG/ML
INJECTION, SOLUTION INTRAMUSCULAR; INTRAVENOUS AS NEEDED
Status: DISCONTINUED | OUTPATIENT
Start: 2020-05-21 | End: 2020-05-21 | Stop reason: SURG

## 2020-05-21 RX ORDER — FENTANYL CITRATE 50 UG/ML
INJECTION, SOLUTION INTRAMUSCULAR; INTRAVENOUS AS NEEDED
Status: DISCONTINUED | OUTPATIENT
Start: 2020-05-21 | End: 2020-05-21 | Stop reason: SURG

## 2020-05-21 RX ORDER — FENTANYL CITRATE/PF 50 MCG/ML
25 SYRINGE (ML) INJECTION
Status: DISCONTINUED | OUTPATIENT
Start: 2020-05-21 | End: 2020-05-21 | Stop reason: HOSPADM

## 2020-05-21 RX ORDER — GINSENG 100 MG
CAPSULE ORAL AS NEEDED
Status: DISCONTINUED | OUTPATIENT
Start: 2020-05-21 | End: 2020-05-21 | Stop reason: HOSPADM

## 2020-05-21 RX ORDER — OXYCODONE HCL 5 MG/5 ML
5 SOLUTION, ORAL ORAL EVERY 4 HOURS PRN
Status: DISCONTINUED | OUTPATIENT
Start: 2020-05-21 | End: 2020-05-21 | Stop reason: HOSPADM

## 2020-05-21 RX ORDER — LIDOCAINE HYDROCHLORIDE 10 MG/ML
0.5 INJECTION, SOLUTION EPIDURAL; INFILTRATION; INTRACAUDAL; PERINEURAL ONCE AS NEEDED
Status: DISCONTINUED | OUTPATIENT
Start: 2020-05-21 | End: 2020-05-21 | Stop reason: HOSPADM

## 2020-05-21 RX ORDER — PROPOFOL 10 MG/ML
INJECTION, EMULSION INTRAVENOUS AS NEEDED
Status: DISCONTINUED | OUTPATIENT
Start: 2020-05-21 | End: 2020-05-21 | Stop reason: SURG

## 2020-05-21 RX ORDER — ROCURONIUM BROMIDE 10 MG/ML
INJECTION, SOLUTION INTRAVENOUS AS NEEDED
Status: DISCONTINUED | OUTPATIENT
Start: 2020-05-21 | End: 2020-05-21 | Stop reason: SURG

## 2020-05-21 RX ORDER — ONDANSETRON 2 MG/ML
4 INJECTION INTRAMUSCULAR; INTRAVENOUS ONCE AS NEEDED
Status: DISCONTINUED | OUTPATIENT
Start: 2020-05-21 | End: 2020-05-21 | Stop reason: HOSPADM

## 2020-05-21 RX ORDER — MIDAZOLAM HYDROCHLORIDE 2 MG/2ML
INJECTION, SOLUTION INTRAMUSCULAR; INTRAVENOUS AS NEEDED
Status: DISCONTINUED | OUTPATIENT
Start: 2020-05-21 | End: 2020-05-21 | Stop reason: SURG

## 2020-05-21 RX ORDER — LIDOCAINE HYDROCHLORIDE 10 MG/ML
INJECTION, SOLUTION EPIDURAL; INFILTRATION; INTRACAUDAL; PERINEURAL AS NEEDED
Status: DISCONTINUED | OUTPATIENT
Start: 2020-05-21 | End: 2020-05-21 | Stop reason: SURG

## 2020-05-21 RX ORDER — ONDANSETRON 2 MG/ML
INJECTION INTRAMUSCULAR; INTRAVENOUS AS NEEDED
Status: DISCONTINUED | OUTPATIENT
Start: 2020-05-21 | End: 2020-05-21 | Stop reason: SURG

## 2020-05-21 RX ORDER — ACETAMINOPHEN 325 MG/1
650 TABLET ORAL EVERY 6 HOURS PRN
Status: DISCONTINUED | OUTPATIENT
Start: 2020-05-21 | End: 2020-05-21 | Stop reason: HOSPADM

## 2020-05-21 RX ORDER — EPHEDRINE SULFATE 50 MG/ML
INJECTION INTRAVENOUS AS NEEDED
Status: DISCONTINUED | OUTPATIENT
Start: 2020-05-21 | End: 2020-05-21 | Stop reason: SURG

## 2020-05-21 RX ORDER — OXYMETAZOLINE HYDROCHLORIDE 0.05 G/100ML
SPRAY NASAL AS NEEDED
Status: DISCONTINUED | OUTPATIENT
Start: 2020-05-21 | End: 2020-05-21 | Stop reason: HOSPADM

## 2020-05-21 RX ORDER — SUCCINYLCHOLINE/SOD CL,ISO/PF 100 MG/5ML
SYRINGE (ML) INTRAVENOUS AS NEEDED
Status: DISCONTINUED | OUTPATIENT
Start: 2020-05-21 | End: 2020-05-21 | Stop reason: SURG

## 2020-05-21 RX ORDER — LIDOCAINE HYDROCHLORIDE AND EPINEPHRINE BITARTRATE 20; .01 MG/ML; MG/ML
INJECTION, SOLUTION SUBCUTANEOUS AS NEEDED
Status: DISCONTINUED | OUTPATIENT
Start: 2020-05-21 | End: 2020-05-21 | Stop reason: HOSPADM

## 2020-05-21 RX ORDER — NEOSTIGMINE METHYLSULFATE 1 MG/ML
INJECTION INTRAVENOUS AS NEEDED
Status: DISCONTINUED | OUTPATIENT
Start: 2020-05-21 | End: 2020-05-21 | Stop reason: SURG

## 2020-05-21 RX ORDER — HYDROMORPHONE HCL 110MG/55ML
PATIENT CONTROLLED ANALGESIA SYRINGE INTRAVENOUS AS NEEDED
Status: DISCONTINUED | OUTPATIENT
Start: 2020-05-21 | End: 2020-05-21 | Stop reason: SURG

## 2020-05-21 RX ADMIN — EPHEDRINE SULFATE 10 MG: 50 INJECTION, SOLUTION INTRAVENOUS at 12:09

## 2020-05-21 RX ADMIN — OXYCODONE HYDROCHLORIDE 5 MG: 5 SOLUTION ORAL at 16:59

## 2020-05-21 RX ADMIN — NEOSTIGMINE METHYLSULFATE 3 MG: 1 INJECTION INTRAVENOUS at 15:19

## 2020-05-21 RX ADMIN — Medication 100 MG: at 11:48

## 2020-05-21 RX ADMIN — SODIUM CHLORIDE, SODIUM LACTATE, POTASSIUM CHLORIDE, AND CALCIUM CHLORIDE: .6; .31; .03; .02 INJECTION, SOLUTION INTRAVENOUS at 14:05

## 2020-05-21 RX ADMIN — MIDAZOLAM HYDROCHLORIDE 2 MG: 1 INJECTION, SOLUTION INTRAMUSCULAR; INTRAVENOUS at 11:39

## 2020-05-21 RX ADMIN — ROCURONIUM BROMIDE 50 MG: 10 SOLUTION INTRAVENOUS at 12:01

## 2020-05-21 RX ADMIN — HYDROMORPHONE HYDROCHLORIDE 0.5 MG: 2 INJECTION INTRAMUSCULAR; INTRAVENOUS; SUBCUTANEOUS at 15:18

## 2020-05-21 RX ADMIN — PROPOFOL 200 MG: 10 INJECTION, EMULSION INTRAVENOUS at 11:48

## 2020-05-21 RX ADMIN — FENTANYL CITRATE 25 MCG: 50 INJECTION INTRAMUSCULAR; INTRAVENOUS at 16:10

## 2020-05-21 RX ADMIN — SODIUM CHLORIDE, SODIUM LACTATE, POTASSIUM CHLORIDE, AND CALCIUM CHLORIDE: .6; .31; .03; .02 INJECTION, SOLUTION INTRAVENOUS at 11:17

## 2020-05-21 RX ADMIN — FENTANYL CITRATE 100 MCG: 50 INJECTION INTRAMUSCULAR; INTRAVENOUS at 11:48

## 2020-05-21 RX ADMIN — FENTANYL CITRATE 25 MCG: 50 INJECTION INTRAMUSCULAR; INTRAVENOUS at 16:07

## 2020-05-21 RX ADMIN — GLYCOPYRROLATE 0.4 MG: 0.2 INJECTION, SOLUTION INTRAMUSCULAR; INTRAVENOUS at 15:19

## 2020-05-21 RX ADMIN — FENTANYL CITRATE 25 MCG: 50 INJECTION INTRAMUSCULAR; INTRAVENOUS at 16:03

## 2020-05-21 RX ADMIN — DEXAMETHASONE SODIUM PHOSPHATE 4 MG: 10 INJECTION, SOLUTION INTRAMUSCULAR; INTRAVENOUS at 12:01

## 2020-05-21 RX ADMIN — LIDOCAINE HYDROCHLORIDE 50 MG: 10 INJECTION, SOLUTION EPIDURAL; INFILTRATION; INTRACAUDAL; PERINEURAL at 11:48

## 2020-05-21 RX ADMIN — ONDANSETRON 4 MG: 2 INJECTION INTRAMUSCULAR; INTRAVENOUS at 15:17

## 2020-05-21 RX ADMIN — HYDROMORPHONE HYDROCHLORIDE 0.5 MG: 2 INJECTION INTRAMUSCULAR; INTRAVENOUS; SUBCUTANEOUS at 15:23

## 2020-05-29 DIAGNOSIS — M25.522 LEFT ELBOW PAIN: Primary | ICD-10-CM

## 2020-05-29 DIAGNOSIS — M25.532 LEFT WRIST PAIN: ICD-10-CM

## 2020-06-05 ENCOUNTER — OFFICE VISIT (OUTPATIENT)
Dept: OBGYN CLINIC | Facility: CLINIC | Age: 45
End: 2020-06-05
Payer: COMMERCIAL

## 2020-06-05 VITALS
BODY MASS INDEX: 34.71 KG/M2 | SYSTOLIC BLOOD PRESSURE: 129 MMHG | HEIGHT: 68 IN | WEIGHT: 229 LBS | DIASTOLIC BLOOD PRESSURE: 86 MMHG | HEART RATE: 67 BPM

## 2020-06-05 DIAGNOSIS — M25.832 ULNAR ABUTMENT SYNDROME OF LEFT WRIST: ICD-10-CM

## 2020-06-05 DIAGNOSIS — M25.522 LEFT ELBOW PAIN: ICD-10-CM

## 2020-06-05 DIAGNOSIS — M77.12 LATERAL EPICONDYLITIS OF LEFT ELBOW: Primary | ICD-10-CM

## 2020-06-05 DIAGNOSIS — M25.532 LEFT WRIST PAIN: ICD-10-CM

## 2020-06-05 PROCEDURE — 3008F BODY MASS INDEX DOCD: CPT | Performed by: ORTHOPAEDIC SURGERY

## 2020-06-05 PROCEDURE — 1036F TOBACCO NON-USER: CPT | Performed by: ORTHOPAEDIC SURGERY

## 2020-06-05 PROCEDURE — 20551 NJX 1 TENDON ORIGIN/INSJ: CPT | Performed by: ORTHOPAEDIC SURGERY

## 2020-06-05 PROCEDURE — 99203 OFFICE O/P NEW LOW 30 MIN: CPT | Performed by: ORTHOPAEDIC SURGERY

## 2020-06-05 PROCEDURE — 20605 DRAIN/INJ JOINT/BURSA W/O US: CPT | Performed by: ORTHOPAEDIC SURGERY

## 2020-06-05 RX ORDER — LIDOCAINE HYDROCHLORIDE 10 MG/ML
1 INJECTION, SOLUTION INFILTRATION; PERINEURAL
Status: COMPLETED | OUTPATIENT
Start: 2020-06-05 | End: 2020-06-05

## 2020-06-05 RX ORDER — TRIAMCINOLONE ACETONIDE 40 MG/ML
20 INJECTION, SUSPENSION INTRA-ARTICULAR; INTRAMUSCULAR
Status: COMPLETED | OUTPATIENT
Start: 2020-06-05 | End: 2020-06-05

## 2020-06-05 RX ORDER — TRIAMCINOLONE ACETONIDE 40 MG/ML
40 INJECTION, SUSPENSION INTRA-ARTICULAR; INTRAMUSCULAR
Status: COMPLETED | OUTPATIENT
Start: 2020-06-05 | End: 2020-06-05

## 2020-06-05 RX ORDER — LIDOCAINE HYDROCHLORIDE 10 MG/ML
0.5 INJECTION, SOLUTION INFILTRATION; PERINEURAL
Status: COMPLETED | OUTPATIENT
Start: 2020-06-05 | End: 2020-06-05

## 2020-06-05 RX ADMIN — LIDOCAINE HYDROCHLORIDE 0.5 ML: 10 INJECTION, SOLUTION INFILTRATION; PERINEURAL at 08:58

## 2020-06-05 RX ADMIN — TRIAMCINOLONE ACETONIDE 40 MG: 40 INJECTION, SUSPENSION INTRA-ARTICULAR; INTRAMUSCULAR at 08:57

## 2020-06-05 RX ADMIN — TRIAMCINOLONE ACETONIDE 20 MG: 40 INJECTION, SUSPENSION INTRA-ARTICULAR; INTRAMUSCULAR at 08:58

## 2020-06-05 RX ADMIN — LIDOCAINE HYDROCHLORIDE 1 ML: 10 INJECTION, SOLUTION INFILTRATION; PERINEURAL at 08:57

## 2020-06-11 ENCOUNTER — TELEPHONE (OUTPATIENT)
Dept: FAMILY MEDICINE CLINIC | Facility: CLINIC | Age: 45
End: 2020-06-11

## 2020-06-12 ENCOUNTER — TELEPHONE (OUTPATIENT)
Dept: FAMILY MEDICINE CLINIC | Facility: CLINIC | Age: 45
End: 2020-06-12

## 2020-06-15 ENCOUNTER — TELEMEDICINE (OUTPATIENT)
Dept: FAMILY MEDICINE CLINIC | Facility: CLINIC | Age: 45
End: 2020-06-15
Payer: COMMERCIAL

## 2020-06-15 DIAGNOSIS — Z86.16 HISTORY OF 2019 NOVEL CORONAVIRUS DISEASE (COVID-19): Primary | ICD-10-CM

## 2020-06-15 PROCEDURE — 99213 OFFICE O/P EST LOW 20 MIN: CPT | Performed by: FAMILY MEDICINE

## 2020-06-26 ENCOUNTER — OFFICE VISIT (OUTPATIENT)
Dept: URGENT CARE | Facility: CLINIC | Age: 45
End: 2020-06-26
Payer: COMMERCIAL

## 2020-06-26 ENCOUNTER — OFFICE VISIT (OUTPATIENT)
Dept: OBGYN CLINIC | Facility: CLINIC | Age: 45
End: 2020-06-26
Payer: COMMERCIAL

## 2020-06-26 VITALS
HEIGHT: 68 IN | SYSTOLIC BLOOD PRESSURE: 130 MMHG | DIASTOLIC BLOOD PRESSURE: 89 MMHG | HEART RATE: 64 BPM | BODY MASS INDEX: 32.71 KG/M2 | WEIGHT: 215.8 LBS

## 2020-06-26 VITALS
RESPIRATION RATE: 18 BRPM | TEMPERATURE: 98.9 F | DIASTOLIC BLOOD PRESSURE: 98 MMHG | SYSTOLIC BLOOD PRESSURE: 133 MMHG | OXYGEN SATURATION: 98 % | HEART RATE: 83 BPM

## 2020-06-26 DIAGNOSIS — M77.12 LATERAL EPICONDYLITIS OF LEFT ELBOW: Primary | ICD-10-CM

## 2020-06-26 DIAGNOSIS — T16.1XXA FOREIGN BODY OF RIGHT EAR, INITIAL ENCOUNTER: Primary | ICD-10-CM

## 2020-06-26 DIAGNOSIS — M25.832 ULNAR ABUTMENT SYNDROME OF LEFT WRIST: ICD-10-CM

## 2020-06-26 PROCEDURE — 3008F BODY MASS INDEX DOCD: CPT | Performed by: PHYSICIAN ASSISTANT

## 2020-06-26 PROCEDURE — 69200 CLEAR OUTER EAR CANAL: CPT | Performed by: PHYSICIAN ASSISTANT

## 2020-06-26 PROCEDURE — 1036F TOBACCO NON-USER: CPT | Performed by: ORTHOPAEDIC SURGERY

## 2020-06-26 PROCEDURE — 99213 OFFICE O/P EST LOW 20 MIN: CPT | Performed by: PHYSICIAN ASSISTANT

## 2020-06-26 PROCEDURE — 99214 OFFICE O/P EST MOD 30 MIN: CPT | Performed by: ORTHOPAEDIC SURGERY

## 2020-06-26 PROCEDURE — 3008F BODY MASS INDEX DOCD: CPT | Performed by: ORTHOPAEDIC SURGERY

## 2020-06-26 RX ORDER — DOXYCYCLINE 100 MG/1
200 CAPSULE ORAL ONCE
Qty: 2 CAPSULE | Refills: 0 | Status: SHIPPED | OUTPATIENT
Start: 2020-06-26 | End: 2020-06-26

## 2020-06-26 RX ORDER — OFLOXACIN 3 MG/ML
10 SOLUTION/ DROPS OPHTHALMIC DAILY
Qty: 5 ML | Refills: 0 | Status: SHIPPED | OUTPATIENT
Start: 2020-06-26 | End: 2020-07-02 | Stop reason: ALTCHOICE

## 2020-07-02 ENCOUNTER — TELEMEDICINE (OUTPATIENT)
Dept: FAMILY MEDICINE CLINIC | Facility: CLINIC | Age: 45
End: 2020-07-02
Payer: COMMERCIAL
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
## 2020-07-02 DIAGNOSIS — G44.219 EPISODIC TENSION-TYPE HEADACHE, NOT INTRACTABLE: ICD-10-CM

## 2020-07-02 DIAGNOSIS — S00.461D TICK BITE OF RIGHT EAR, SUBSEQUENT ENCOUNTER: Primary | ICD-10-CM
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
## 2020-07-02 PROBLEM — S00.461A TICK BITE OF RIGHT EAR: Status: ACTIVE | Noted: 2020-07-02

## 2020-07-02 PROCEDURE — 1036F TOBACCO NON-USER: CPT | Performed by: PHYSICIAN ASSISTANT

## 2020-07-02 PROCEDURE — 99214 OFFICE O/P EST MOD 30 MIN: CPT | Performed by: PHYSICIAN ASSISTANT

## 2020-07-02 NOTE — LETTER
July 2, 2020     Patient: Herb Humphries   YOB: 1975   Date of Visit: 7/2/2020       To Whom it May Concern:    Herb Humphries is under my professional care  He was seen in my office on 7/2/2020 to discuss persistent headache and follow up on tick removal of R ear  He may return to work on 7/3/2020  If you have any questions or concerns, please don't hesitate to call           Sincerely,          Gen Arroyo PA-C        CC: No Recipients

## 2020-07-02 NOTE — PROGRESS NOTES
Bret Watkins 1975 male MRN: [id number]    Acute Visit        ASSESSMENT/PLAN  Problem List Items Addressed This Visit     None      Visit Diagnoses     Tick bite of right ear, subsequent encounter    -  Primary    Episodic tension-type headache, not intractable            R EAC healing well, some scabbing, no cellulitis or foreign body remains  No sx concerning for lyme at this time  HA likely tension-type or occular in nature given worsening since wearing heavy face-shield causing bilateral occipital and frontal pressure  Wear lighter face-shield or goggles, NSAIDs/tylenol for discomfort  Stay well hydrated  No associated red-flag sx  Follow up if sx worsen or persist         No future appointments  SUBJECTIVE  CC: Follow-up (Patient seen in office today for a follow up from Urgent Care - had a tick removed from his Rt Ear) and Headache       Pt presents today for follow up of tick removal of R ear 7 days ago, and also to discuss persistent headaches  Tick removed from R EAC 7 days ago at [facility]  Shares he completed ofloxacin ggts  There has been no pain, no hearing deficit, no fevers, chills, joint pains, rash, fatigue  Does share has has persistent headaches since wearing a face shield at work, these were occurring before the tick bite  Shares the HA is worse and usually bilateral occipital or frontal in nature  Described as a pressure, no associated N/V, dizziness, lightheadedness, numbness, weakness or vision change  Has been taking naproxen with relief  Believes the shield he is required to wear at work is too heavy causing strain  Pt shared he had medical condition to not wear a mask, therefore HPI was taken via phone and exam done in parking lot    Bret Watkins is a 41 y o  male who presented for an acute visit complaining of  Review of Systems   Constitutional: Negative for chills, fatigue and fever     HENT: Negative for congestion, ear pain, hearing loss, nosebleeds, postnasal drip, rhinorrhea, sinus pressure, sinus pain, sneezing and sore throat  Eyes: Negative for pain, discharge, itching and visual disturbance  Respiratory: Negative for cough, chest tightness, shortness of breath and wheezing  Cardiovascular: Negative for chest pain, palpitations and leg swelling  Gastrointestinal: Negative for abdominal pain, blood in stool, constipation, diarrhea, nausea and vomiting  Genitourinary: Negative for frequency and urgency  Skin:        Tick bite, s/p removal, R EAC   Neurological: Positive for headaches  Negative for dizziness, light-headedness and numbness  Historical Information   The patient history was reviewed as follows:  Past Medical History:   Diagnosis Date    Environmental allergies      Past Surgical History:   Procedure Laterality Date    CLOSED MANIPULATION SHOULDER Left     HAND SURGERY Right     fracture with screw placement    MD EXCISION TURBINATE,SUBMUCOUS N/A 2020    Procedure: SUBMUCOSAL RESECTION OF TURBINATES WITH CADAVERIC RIB GRAFT;  Surgeon: Sheila Carreno MD;  [facility];  Service: ENT    MD REPAIR NASAL CAVITY STENOSIS N/A 2020    Procedure: REPAIR VESTIBULAR STENOSIS;  Surgeon: Sheila Carreno MD;  [facility];  Service: ENT    MD REPAIR OF NASAL SEPTUM N/A 2020    Procedure: SEPTOPLASTY;  Surgeon: Sheila Carreno MD;  [facility];  Service: ENT     No family history on file     Social History   Social History     Substance and Sexual Activity   Alcohol Use Yes    Frequency: Monthly or less    Drinks per session: 1 or 2     Social History     Substance and Sexual Activity   Drug Use Never     Social History     Tobacco Use   Smoking Status Former Smoker    Last attempt to quit: 2000    Years since quittin 1   Smokeless Tobacco Former User    Types: Tracy Oconnor Quit date:    Tobacco Comment    quit 2016       Medications:   Meds/Allergies   Current Outpatient Medications   Medication Sig Dispense Refill    albuterol (PROVENTIL HFA,VENTOLIN HFA) 90 mcg/act inhaler Inhale 2 puffs every 6 (six) hours as needed for wheezing or shortness of breath 1 Inhaler 5    cetirizine (ZyrTEC) 10 mg tablet Take 10 mg by mouth daily      diphenhydrAMINE (BENADRYL) 25 mg tablet Take 25 mg by mouth every 6 (six) hours as needed for itching       No current facility-administered medications for this visit  Allergies   Allergen Reactions    Hydrocodone Itching       OBJECTIVE  Vitals: There were no vitals filed for this visit  Invasive Devices     None                 Physical Exam   Constitutional: He is oriented to person, place, and time  He appears well-developed and well-nourished  No distress  HENT:   Head: Normocephalic and atraumatic  Right Ear: Hearing, tympanic membrane and external ear normal  Right ear exhibits lacerations (evidence of recent removal with some scabbing, no erythema, abscess or foreign body noted)  No foreign bodies  Tympanic membrane is not injected, not erythematous and not bulging  Left Ear: Hearing, tympanic membrane and external ear normal    Mouth/Throat: Oropharynx is clear and moist    Eyes: Conjunctivae are normal    Neck: Normal range of motion  Pulmonary/Chest: Effort normal  No respiratory distress  Musculoskeletal: Normal range of motion  He exhibits no edema  Neurological: He is alert and oriented to person, place, and time  Skin: Skin is warm and dry  No rash noted  Psychiatric: He has a normal mood and affect  His behavior is normal    Vitals reviewed  Lab:  I have personally reviewed all pertinent results

## 2021-07-22 ENCOUNTER — OFFICE VISIT (OUTPATIENT)
Dept: URGENT CARE | Facility: CLINIC | Age: 46
End: 2021-07-22
Payer: COMMERCIAL

## 2021-07-22 ENCOUNTER — APPOINTMENT (OUTPATIENT)
Dept: RADIOLOGY | Facility: CLINIC | Age: 46
End: 2021-07-22
Payer: COMMERCIAL

## 2021-07-22 VITALS
WEIGHT: 238.8 LBS | OXYGEN SATURATION: 99 % | DIASTOLIC BLOOD PRESSURE: 79 MMHG | SYSTOLIC BLOOD PRESSURE: 131 MMHG | BODY MASS INDEX: 36.19 KG/M2 | HEIGHT: 68 IN | TEMPERATURE: 98.1 F | HEART RATE: 90 BPM | RESPIRATION RATE: 18 BRPM

## 2021-07-22 DIAGNOSIS — M79.675 PAIN IN TOE OF LEFT FOOT: ICD-10-CM

## 2021-07-22 DIAGNOSIS — L03.032 CELLULITIS OF FOURTH TOE, LEFT: ICD-10-CM

## 2021-07-22 DIAGNOSIS — M79.675 PAIN IN TOE OF LEFT FOOT: Primary | ICD-10-CM

## 2021-07-22 PROCEDURE — 99213 OFFICE O/P EST LOW 20 MIN: CPT | Performed by: NURSE PRACTITIONER

## 2021-07-22 PROCEDURE — 73630 X-RAY EXAM OF FOOT: CPT

## 2021-07-22 RX ORDER — CLINDAMYCIN HYDROCHLORIDE 300 MG/1
300 CAPSULE ORAL 3 TIMES DAILY
Qty: 21 CAPSULE | Refills: 0 | Status: SHIPPED | OUTPATIENT
Start: 2021-07-22 | End: 2021-07-29

## 2021-07-22 NOTE — PATIENT INSTRUCTIONS
No acute abnormality on x-ray  Suspect this may be a cellulitis of the toe  Will start antibiotic  Take probiotic  Warm soaks 3 times a day  Apply clean dry dressing as there is a crack between your toes  Follow-up with podiatry  Continue to monitor for any worsening symptoms, if he noticed any increased redness, streaking or fevers would recommend you go directly to the ER  The    Cellulitis   WHAT YOU NEED TO KNOW:   Cellulitis is a skin infection caused by bacteria  Cellulitis is common and can become severe  Cellulitis usually appears on the lower legs  It can also appear on the arms, face, and other areas  Cellulitis develops when bacteria enter a crack or break in your skin, such as a scratch, bite, or cut  DISCHARGE INSTRUCTIONS:   Return to the emergency department if:   · Your wound gets larger and more painful  · You feel a crackling under your skin when you touch it  · You have purple dots or bumps on your skin, or you see bleeding under your skin  · You see red streaks coming from the infected area  Call your doctor if:   · The red, warm, swollen area gets larger  · Your fever or pain does not go away or gets worse  · The area does not get smaller after 3 days of antibiotics  · You have questions or concerns about your condition or care  Medicines: You should start to see improvement in 3 days  If cellulitis is not treated, the infection can spread through your body and become life-threatening  You may need any of the following medicines:  · Antibiotics  help treat the bacterial infection  · Acetaminophen  decreases pain and fever  It is available without a doctor's order  Ask how much to take and how often to take it  Follow directions  Read the labels of all other medicines you are using to see if they also contain acetaminophen, or ask your doctor or pharmacist  Acetaminophen can cause liver damage if not taken correctly   Do not use more than 4 grams (4,000 milligrams) total of acetaminophen in one day  · NSAIDs , such as ibuprofen, help decrease swelling, pain, and fever  This medicine is available with or without a doctor's order  NSAIDs can cause stomach bleeding or kidney problems in certain people  If you take blood thinner medicine, always ask your healthcare provider if NSAIDs are safe for you  Always read the medicine label and follow directions  · Take your medicine as directed  Contact your healthcare provider if you think your medicine is not helping or if you have side effects  Tell him or her if you are allergic to any medicine  Keep a list of the medicines, vitamins, and herbs you take  Include the amounts, and when and why you take them  Bring the list or the pill bottles to follow-up visits  Carry your medicine list with you in case of an emergency  Self-care:   · Wash the area with soap and water every day  Gently pat dry  Use bandages if directed by your healthcare provider  · Elevate the area above the level of your heart  as often as you can  This will help decrease swelling and pain  Prop the area on pillows or blankets to keep it elevated comfortably  · Place a cool, damp cloth on the area  Use clean cloths and clean water  You can do this as often as you need to  Cool, damp cloths may help decrease pain  · Apply cream or ointment as directed  These help protect the area  Most over-the-counter products, such as petroleum jelly, are good to use  Ask your healthcare provider about specific creams or ointments you should use  Prevent cellulitis:   · Do not scratch bug bites or areas of injury  You increase your risk for cellulitis by scratching these areas  · Do not share personal items, such as towels, clothing, and razors  · Clean exercise equipment  with germ-killing  before and after you use it  · Treat athlete's foot  This can help prevent the spread of a bacterial skin infection      · Wash your hands often  Use soap and water  Wash your hands after you use the bathroom, change a child's diapers, or sneeze  Wash your hands before you prepare or eat food  Use lotion to prevent dry, cracked skin  Follow up with your doctor within 3 days, or as directed:  He or she will check if your cellulitis is getting better  Write down your questions so you remember to ask them during your visits  © Copyright [employer] 2021 Information is for End User's use only and may not be sold, redistributed or otherwise used for commercial purposes  All illustrations and images included in CareNotes® are the copyrighted property of A D A M , Inc  or [employer] Wright   The above information is an  only  It is not intended as medical advice for individual conditions or treatments  Talk to your doctor, nurse or pharmacist before following any medical regimen to see if it is safe and effective for you

## 2021-07-22 NOTE — PROGRESS NOTES
[facility]        NAME: Renita Ganser is a 40 y o  male  : 1975    MRN: [id number]  DATE: 2021  TIME: 3:16 PM    Assessment and Plan   Pain in toe of left foot [M79 675]  1  Pain in toe of left foot  XR foot 3+ vw left   2  Cellulitis of fourth toe, left  clindamycin (CLEOCIN) 300 MG capsule         Patient Instructions     Patient Instructions     No acute abnormality on x-ray  Suspect this may be a cellulitis of the toe  Will start antibiotic  Take probiotic  Warm soaks 3 times a day  Apply clean dry dressing as there is a crack between your toes  Follow-up with podiatry  Continue to monitor for any worsening symptoms, if he noticed any increased redness, streaking or fevers would recommend you go directly to the ER  The    Cellulitis   WHAT YOU NEED TO KNOW:   Cellulitis is a skin infection caused by bacteria  Cellulitis is common and can become severe  Cellulitis usually appears on the lower legs  It can also appear on the arms, face, and other areas  Cellulitis develops when bacteria enter a crack or break in your skin, such as a scratch, bite, or cut  DISCHARGE INSTRUCTIONS:   Return to the emergency department if:   · Your wound gets larger and more painful  · You feel a crackling under your skin when you touch it  · You have purple dots or bumps on your skin, or you see bleeding under your skin  · You see red streaks coming from the infected area  Call your doctor if:   · The red, warm, swollen area gets larger  · Your fever or pain does not go away or gets worse  · The area does not get smaller after 3 days of antibiotics  · You have questions or concerns about your condition or care  Medicines: You should start to see improvement in 3 days  If cellulitis is not treated, the infection can spread through your body and become life-threatening   You may need any of the following medicines:  · Antibiotics  help treat the bacterial infection  · Acetaminophen  decreases pain and fever  It is available without a doctor's order  Ask how much to take and how often to take it  Follow directions  Read the labels of all other medicines you are using to see if they also contain acetaminophen, or ask your doctor or pharmacist  Acetaminophen can cause liver damage if not taken correctly  Do not use more than 4 grams (4,000 milligrams) total of acetaminophen in one day  · NSAIDs , such as ibuprofen, help decrease swelling, pain, and fever  This medicine is available with or without a doctor's order  NSAIDs can cause stomach bleeding or kidney problems in certain people  If you take blood thinner medicine, always ask your healthcare provider if NSAIDs are safe for you  Always read the medicine label and follow directions  · Take your medicine as directed  Contact your healthcare provider if you think your medicine is not helping or if you have side effects  Tell him or her if you are allergic to any medicine  Keep a list of the medicines, vitamins, and herbs you take  Include the amounts, and when and why you take them  Bring the list or the pill bottles to follow-up visits  Carry your medicine list with you in case of an emergency  Self-care:   · Wash the area with soap and water every day  Gently pat dry  Use bandages if directed by your healthcare provider  · Elevate the area above the level of your heart  as often as you can  This will help decrease swelling and pain  Prop the area on pillows or blankets to keep it elevated comfortably  · Place a cool, damp cloth on the area  Use clean cloths and clean water  You can do this as often as you need to  Cool, damp cloths may help decrease pain  · Apply cream or ointment as directed  These help protect the area  Most over-the-counter products, such as petroleum jelly, are good to use  Ask your healthcare provider about specific creams or ointments you should use      Prevent cellulitis:   · Do not scratch bug bites or areas of injury  You increase your risk for cellulitis by scratching these areas  · Do not share personal items, such as towels, clothing, and razors  · Clean exercise equipment  with germ-killing  before and after you use it  · Treat athlete's foot  This can help prevent the spread of a bacterial skin infection  · Wash your hands often  Use soap and water  Wash your hands after you use the bathroom, change a child's diapers, or sneeze  Wash your hands before you prepare or eat food  Use lotion to prevent dry, cracked skin  Follow up with your doctor within 3 days, or as directed:  He or she will check if your cellulitis is getting better  Write down your questions so you remember to ask them during your visits  © Copyright [employer] 2021 Information is for End User's use only and may not be sold, redistributed or otherwise used for commercial purposes  All illustrations and images included in CareNotes® are the copyrighted property of A D A M , Inc  or [facility] [employer]   The above information is an  only  It is not intended as medical advice for individual conditions or treatments  Talk to your doctor, nurse or pharmacist before following any medical regimen to see if it is safe and effective for you  Chief Complaint     Chief Complaint   Patient presents with    Toe Pain     Left 4th toe pain swollen started last night Pt thinks its gout  no hx  History of Present Illness   Flory Keller presents to the clinic c/o    The the this is a 60-year-old male here today with complaints of pain in the left 4th toe  He states symptoms started last night  He states he has been having some body aches today  He denies any fever or chills  He is concerned he may have gout  He denies any history of gout and has never been tested for gout    He also notes that he thinks he may have some athletic feet and has been applying powder  He noticed redness and swelling today  No streaking up the leg  Review of Systems   Review of Systems   Constitutional: Negative for activity change, chills, fatigue and fever  Respiratory: Negative  Cardiovascular: Negative  Skin: Positive for rash and wound  Neurological: Negative  Psychiatric/Behavioral: Negative            Current Medications     Long-Term Medications   Medication Sig Dispense Refill    diphenhydrAMINE (BENADRYL) 25 mg tablet Take 25 mg by mouth every 6 (six) hours as needed for itching      cetirizine (ZyrTEC) 10 mg tablet Take 10 mg by mouth daily (Patient not taking: Reported on 7/22/2021)         Current Allergies     Allergies as of 07/22/2021 - Reviewed 07/22/2021   Allergen Reaction Noted    Hydrocodone Itching 05/28/2014            The following portions of the patient's history were reviewed and updated as appropriate: allergies, current medications, past family history, past medical history, past social history, past surgical history and problem list     Objective   /79   Pulse 90   Temp 98 1 °F (36 7 °C)   Resp 18   Ht 5' 8" (1 727 m)   Wt 108 kg (238 lb 12 8 oz)   SpO2 99%   BMI 36 31 kg/m²        Physical Exam     Physical Exam  Musculoskeletal:        Feet:      no acute abnormality on xray

## 2021-12-03 ENCOUNTER — APPOINTMENT (OUTPATIENT)
Dept: RADIOLOGY | Facility: CLINIC | Age: 46
End: 2021-12-03
Payer: COMMERCIAL

## 2021-12-03 ENCOUNTER — OFFICE VISIT (OUTPATIENT)
Dept: FAMILY MEDICINE CLINIC | Facility: CLINIC | Age: 46
End: 2021-12-03
Payer: COMMERCIAL

## 2021-12-03 VITALS
OXYGEN SATURATION: 98 % | HEART RATE: 56 BPM | BODY MASS INDEX: 35.31 KG/M2 | WEIGHT: 233 LBS | HEIGHT: 68 IN | TEMPERATURE: 97.9 F | SYSTOLIC BLOOD PRESSURE: 120 MMHG | DIASTOLIC BLOOD PRESSURE: 90 MMHG

## 2021-12-03 DIAGNOSIS — M54.2 CERVICALGIA: ICD-10-CM

## 2021-12-03 DIAGNOSIS — J45.990 EXERCISE-INDUCED ASTHMA: ICD-10-CM

## 2021-12-03 DIAGNOSIS — G43.109 OCULAR MIGRAINE: ICD-10-CM

## 2021-12-03 DIAGNOSIS — R21 SKIN RASH: ICD-10-CM

## 2021-12-03 DIAGNOSIS — S06.9X0S CLOSED TRAUMATIC BRAIN INJURY, WITHOUT LOSS OF CONSCIOUSNESS, SEQUELA (HCC): Primary | ICD-10-CM

## 2021-12-03 PROBLEM — S06.9X9A TRAUMATIC BRAIN INJURY, CLOSED (HCC): Status: ACTIVE | Noted: 2021-12-03

## 2021-12-03 PROBLEM — S00.461A TICK BITE OF RIGHT EAR: Status: RESOLVED | Noted: 2020-07-02 | Resolved: 2021-12-03

## 2021-12-03 PROBLEM — U07.1 COVID-19: Status: RESOLVED | Noted: 2020-04-20 | Resolved: 2021-12-03
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
## 2021-12-03 PROBLEM — S06.9XAA TRAUMATIC BRAIN INJURY, CLOSED: Status: ACTIVE | Noted: 2021-12-03

## 2021-12-03 PROCEDURE — 3008F BODY MASS INDEX DOCD: CPT | Performed by: FAMILY MEDICINE

## 2021-12-03 PROCEDURE — 1036F TOBACCO NON-USER: CPT | Performed by: FAMILY MEDICINE

## 2021-12-03 PROCEDURE — 99214 OFFICE O/P EST MOD 30 MIN: CPT | Performed by: FAMILY MEDICINE

## 2021-12-03 PROCEDURE — 3725F SCREEN DEPRESSION PERFORMED: CPT | Performed by: FAMILY MEDICINE

## 2021-12-03 PROCEDURE — 72050 X-RAY EXAM NECK SPINE 4/5VWS: CPT

## 2021-12-03 RX ORDER — ALBUTEROL SULFATE 90 UG/1
2 AEROSOL, METERED RESPIRATORY (INHALATION) EVERY 6 HOURS PRN
Qty: 18 G | Refills: 5 | Status: SHIPPED | OUTPATIENT
Start: 2021-12-03

## 2021-12-03 RX ORDER — SUMATRIPTAN 50 MG/1
50 TABLET, FILM COATED ORAL ONCE AS NEEDED
Qty: 9 TABLET | Refills: 0 | Status: SHIPPED | OUTPATIENT
Start: 2021-12-03 | End: 2022-08-10 | Stop reason: SDUPTHER

## 2021-12-03 RX ORDER — TIZANIDINE HYDROCHLORIDE 6 MG/1
6 CAPSULE, GELATIN COATED ORAL
Qty: 30 CAPSULE | Refills: 1 | Status: SHIPPED | OUTPATIENT
Start: 2021-12-03

## 2021-12-03 RX ORDER — TRIAMCINOLONE ACETONIDE 5 MG/G
CREAM TOPICAL 3 TIMES DAILY
Qty: 30 G | Refills: 0 | Status: SHIPPED | OUTPATIENT
Start: 2021-12-03

## 2021-12-16 ENCOUNTER — HOSPITAL ENCOUNTER (OUTPATIENT)
Dept: MRI IMAGING | Facility: HOSPITAL | Age: 46
Discharge: HOME | End: 2021-12-16
Payer: COMMERCIAL

## 2021-12-16 DIAGNOSIS — S06.9X0S CLOSED TRAUMATIC BRAIN INJURY, WITHOUT LOSS OF CONSCIOUSNESS, SEQUELA (HCC): ICD-10-CM

## 2021-12-16 PROCEDURE — 70551 MRI BRAIN STEM W/O DYE: CPT

## 2022-01-18 ENCOUNTER — CONSULT (OUTPATIENT)
Dept: NEUROLOGY | Facility: CLINIC | Age: 47
End: 2022-01-18
Payer: COMMERCIAL

## 2022-01-18 VITALS
TEMPERATURE: 69.9 F | OXYGEN SATURATION: 98 % | SYSTOLIC BLOOD PRESSURE: 130 MMHG | BODY MASS INDEX: 35.31 KG/M2 | HEIGHT: 68 IN | DIASTOLIC BLOOD PRESSURE: 80 MMHG | HEART RATE: 69 BPM | WEIGHT: 233 LBS

## 2022-01-18 DIAGNOSIS — G43.109 MIGRAINE WITH AURA AND WITHOUT STATUS MIGRAINOSUS, NOT INTRACTABLE: Primary | ICD-10-CM

## 2022-01-18 DIAGNOSIS — R41.840 DISTURBED CONCENTRATION: ICD-10-CM

## 2022-01-18 PROCEDURE — 3008F BODY MASS INDEX DOCD: CPT | Performed by: PSYCHIATRY & NEUROLOGY

## 2022-01-18 PROCEDURE — 1036F TOBACCO NON-USER: CPT | Performed by: PSYCHIATRY & NEUROLOGY

## 2022-01-18 PROCEDURE — 99204 OFFICE O/P NEW MOD 45 MIN: CPT | Performed by: PSYCHIATRY & NEUROLOGY

## 2022-01-18 NOTE — PROGRESS NOTES
Heber Mckeon is a 40 y o  male presents with complaints of headache    Assessment:  1  Migraine with aura and without status migrainosus, not intractable    2  Disturbed concentration        Plan:  Continue Imitrex   Follow-up 3 months    Discussion:  Colleen Rogel has typical migraine with aura occurring once or twice a month  He recently started Imitrex 50 mg and found at effective in preventing the headache when he took it at the onset of his vision disturbance  No adverse effects from the medication  His neurologic exam is nonfocal and imaging studies are unremarkable (white matter changes most likely related to migraine)  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 and cannot be certain that some of the white matter changes no may not be related to his previous head injury but I would not expect his migraine issues currently to be related  We discussed various triggers for migraine and have recommended keeping a headache calendar  Currently his headache frequency does not warrant a prophylactic approach  He does report some difficulties with concentration over many years but finds that when he enjoys something he does well and has no problems with work  He will continue leaving notes or using his phone for reminders  I will see him back in follow-up in 3 months      Subjective:    HPI  Colleen Rogel is a right-handed man who presents with the above complaints  He states that beginning in high school he would have occasional migraine-type headaches  He states that over the years they have slightly increased in frequency  He states when he gets a headache it is typically in the front of his head as a pressure achy type pain without significant photophobia, sonophobia or nausea  He states the headache is always preceded by vision disturbance that is a bright light associated with a distortion in vision like heat off the pavement    He states that this vision disturbance typical lateralizes to 1 side or the other but not always the same side and is followed after about 20 minutes or so by headache that is on the opposite side of the vision  He states that if he takes something for the headache as soon as he notices the vision disturbance the headache is manageable and goes away within a couple of hours  He states he does not take something the headache can last for a few days but gradually decreasing in intensity each day  He finds the headache is amplified with exertion  He states that a few years ago he was getting these type of headaches less than once a month  Now he gets them once or twice a month  Bright lights will sometimes precipitate the headache  He has not noticed anything else that brings the headache on  He has tried Excedrin Tylenol or Advil and more recently was given Imitrex  He states that the 50 mg Imitrex was taken on just 1 occasion and states that he did not get the headache when he took it at the onset of his vision disturbance  He had no adverse effects from the medication  He recently had a brain MRI done that demonstrated some nonspecific white matter changes  He reports that prior to his current job as a [employer]  he worked in a [employer]  He states that he had multiple injuries and probable multiple concussions  In addition of the above symptoms he states that sometimes he has problems with his memory or concentration  He states he will walk into a room and forget what he wanted  He states he will be working out in his barn and wants to bring something into the house  He states he puts it right by the door that he is going to walk through and then when he gets to the house realizes that he left in the barn goes back to get it  He states that he left post at note on his vehicle seat to remember to take his wife's easy pass out of the car and forgot it    He states that issues like this have been present for many years and do not seem to be getting worse  He states that he finished high school and went to college for 3 and half years  He states if he enjoyed the subject he would often get an a and if he did not like it he would not do well on the subject  He states that currently he really enjoys working as a  and has no problems remembering to do things appropriately on the job in his performance is stellar  Past Medical History:   Diagnosis Date    Environmental allergies        Family History:  Family History   Problem Relation Age of Onset    Arthritis Mother     Diabetes Mother        Past Surgical History:  Past Surgical History:   Procedure Laterality Date    CLOSED MANIPULATION SHOULDER Left     HAND SURGERY Right     fracture with screw placement    FL EXCISION TURBINATE,SUBMUCOUS N/A 5/21/2020    Procedure: SUBMUCOSAL RESECTION OF TURBINATES WITH CADAVERIC RIB GRAFT;  Surgeon: Latisha Leonardo MD;  [facility];  Service: ENT    FL REPAIR NASAL CAVITY STENOSIS N/A 5/21/2020    Procedure: REPAIR VESTIBULAR STENOSIS;  Surgeon: Latisha Leonardo MD;  [facility];  Service: ENT    FL REPAIR OF NASAL SEPTUM N/A 5/21/2020    Procedure: SEPTOPLASTY;  Surgeon: Latisha Leonardo MD;  [facility];  Service: ENT       Social History:   reports that he quit smoking about 21 years ago  He quit smokeless tobacco use about 6 years ago  His smokeless tobacco use included chew  He reports current alcohol use  He reports that he does not use drugs      Allergies:  Hydrocodone      Current Outpatient Medications:     albuterol (PROVENTIL HFA,VENTOLIN HFA) 90 mcg/act inhaler, Inhale 2 puffs every 6 (six) hours as needed for wheezing or shortness of breath, Disp: 18 g, Rfl: 5    SUMAtriptan (Imitrex) 50 mg tablet, Take 1 tablet (50 mg total) by mouth once as needed for migraine for up to 1 dose, Disp: 9 tablet, Rfl: 0    TiZANidine (ZANAFLEX) 6 MG capsule, Take 1 capsule (6 mg total) by mouth daily at bedtime, Disp: 30 capsule, Rfl: 1    triamcinolone (KENALOG) 0 5 % cream, Apply topically 3 (three) times a day, Disp: 30 g, Rfl: 0    I have reviewed the past medical, social and family history, current medications, allergies, vitals, review of systems and updated this information as appropriate today     Objective:    Vitals:  Blood pressure 130/80, pulse 69, temperature (!) 69 9 °F (21 1 °C), temperature source Temporal, height 5' 8" (1 727 m), weight 106 kg (233 lb), SpO2 98 %  Physical Exam    Neurological Exam    GENERAL:  Cooperative in no acute distress  Well-developed and well-nourished    HEAD and NECK   Head is atraumatic normocephalic with no lesions or masses  Neck is supple with full range of motion    CARDIOVASCULAR  Carotid Arteries-no carotid bruits  NEUROLOGIC:  Mental Status-the patient is awake alert and oriented without aphasia or apraxia  Cranial Nerves: Visual fields are full to confrontation  Discs are flat  Extraocular movements are full without nystagmus  Pupils are 2-1/2 mm and reactive  Face is symmetrical to light touch  Movements of facial expression move symmetrically  Hearing is normal to finger rub bilaterally  Soft palate lifts symmetrically  Shoulder shrug is symmetrical  Tongue is midline without atrophy  Motor: No drift is noted on arm extension  Strength is full in the upper and lower extremities with normal bulk and tone  Sensory: Intact to temperature and vibratory sensation in the upper and lower extremities bilaterally  Cortical function is intact  Coordination: Finger to nose testing is performed accurately  Romberg is negative  Gait reveals a normal base with symmetrical arm swing  Tandem walk is normal   Reflexes:  1+ and symmetrical in the biceps, triceps, brachioradialis, knee jerk and ankle jerk regions  Toes are downgoing            ROS:    Review of Systems   Constitutional: Negative  Negative for appetite change and fever  HENT: Negative    Negative for hearing loss, tinnitus, trouble swallowing and voice change  Eyes: Negative  Negative for photophobia and pain  Respiratory: Negative  Negative for shortness of breath  Cardiovascular: Negative  Negative for palpitations  Gastrointestinal: Negative  Negative for nausea and vomiting  Endocrine: Negative  Negative for cold intolerance  Genitourinary: Negative  Negative for dysuria, frequency and urgency  Musculoskeletal: Positive for back pain and neck pain  Negative for myalgias  Skin: Negative  Negative for rash  Neurological: Negative  Negative for dizziness, tremors, seizures, syncope, facial asymmetry, speech difficulty, weakness, light-headedness, numbness and headaches  Hematological: Negative  Does not bruise/bleed easily  Psychiatric/Behavioral: Negative  Negative for confusion, hallucinations and sleep disturbance

## 2022-01-18 NOTE — LETTER
January 18, 2022     Apolinar Smith, [address] [employer]  [address] [place] [address]  [address]    Patient: Rhianna Alonzo   YOB: 1975   Date of Visit: 1/18/2022       Dear Dr Zenaida Baumgarten:    Thank you for referring Rhianna Alonzo to me for evaluation  Below are my notes for this consultation  If you have questions, please do not hesitate to call me  I look forward to following your patient along with you  Sincerely,        Sun Petersen MD        CC: No Recipients  Sun Petersen MD  1/18/2022  1:02 PM  Sign when Signing Visit  Rhianna Alonzo is a 40 y o  male presents with complaints of headache    Assessment:  1  Migraine with aura and without status migrainosus, not intractable    2  Disturbed concentration        Plan:  Continue Imitrex   Follow-up 3 months    Discussion:  Matt Billingsley has typical migraine with aura occurring once or twice a month  He recently started Imitrex 50 mg and found at effective in preventing the headache when he took it at the onset of his vision disturbance  No adverse effects from the medication  His neurologic exam is nonfocal and imaging studies are unremarkable (white matter changes most likely related to migraine)  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 and cannot be certain that some of the white matter changes no may not be related to his previous head injury but I would not expect his migraine issues currently to be related  We discussed various triggers for migraine and have recommended keeping a headache calendar  Currently his headache frequency does not warrant a prophylactic approach  He does report some difficulties with concentration over many years but finds that when he enjoys something he does well and has no problems with work  He will continue leaving notes or using his phone for reminders    I will see him back in follow-up in 3 months      Subjective:    HPI  Matt Billingsley is a right-handed man who presents with the above complaints  He states that beginning in high school he would have occasional migraine-type headaches  He states that over the years they have slightly increased in frequency  He states when he gets a headache it is typically in the front of his head as a pressure achy type pain without significant photophobia, sonophobia or nausea  He states the headache is always preceded by vision disturbance that is a bright light associated with a distortion in vision like heat off the pavement  He states that this vision disturbance typical lateralizes to 1 side or the other but not always the same side and is followed after about 20 minutes or so by headache that is on the opposite side of the vision  He states that if he takes something for the headache as soon as he notices the vision disturbance the headache is manageable and goes away within a couple of hours  He states he does not take something the headache can last for a few days but gradually decreasing in intensity each day  He finds the headache is amplified with exertion  He states that a few years ago he was getting these type of headaches less than once a month  Now he gets them once or twice a month  Bright lights will sometimes precipitate the headache  He has not noticed anything else that brings the headache on  He has tried Excedrin Tylenol or Advil and more recently was given Imitrex  He states that the 50 mg Imitrex was taken on just 1 occasion and states that he did not get the headache when he took it at the onset of his vision disturbance  He had no adverse effects from the medication  He recently had a brain MRI done that demonstrated some nonspecific white matter changes  He reports that 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  He states that he had multiple injuries and probable multiple concussions    In addition of the above symptoms he states that sometimes he has problems with his memory or concentration  He states he will walk into a room and forget what he wanted  He states he will be working out in his barn and wants to bring something into the house  He states he puts it right by the door that he is going to walk through and then when he gets to the house realizes that he left in the barn goes back to get it  He states that he left post at note on his vehicle seat to remember to take his wife's easy pass out of the car and forgot it  He states that issues like this have been present for many years and do not seem to be getting worse  He states that he finished high school and went to college for 3 and half years  He states if he enjoyed the subject he would often get an a and if he did not like it he would not do well on the subject  He states that currently he really enjoys working as a  and has no problems remembering to do things appropriately on the job in his performance is stellar  Past Medical History:   Diagnosis Date    Environmental allergies        Family History:  Family History   Problem Relation Age of Onset    Arthritis Mother     Diabetes Mother        Past Surgical History:  Past Surgical History:   Procedure Laterality Date    CLOSED MANIPULATION SHOULDER Left     HAND SURGERY Right     fracture with screw placement    IN EXCISION TURBINATE,SUBMUCOUS N/A 5/21/2020    Procedure: SUBMUCOSAL RESECTION OF TURBINATES WITH CADAVERIC RIB GRAFT;  Surgeon: Elaine Seymour MD;  [facility];  Service: ENT    IN REPAIR NASAL CAVITY STENOSIS N/A 5/21/2020    Procedure: REPAIR VESTIBULAR STENOSIS;  Surgeon: Elaine Seymour MD;  [facility];  Service: ENT    IN REPAIR OF NASAL SEPTUM N/A 5/21/2020    Procedure: SEPTOPLASTY;  Surgeon: Elaine Seymour MD;  [facility];  Service: ENT       Social History:   reports that he quit smoking about 21 years ago  He quit smokeless tobacco use about 6 years ago    His smokeless tobacco use included chew  He reports current alcohol use  He reports that he does not use drugs  Allergies:  Hydrocodone      Current Outpatient Medications:     albuterol (PROVENTIL HFA,VENTOLIN HFA) 90 mcg/act inhaler, Inhale 2 puffs every 6 (six) hours as needed for wheezing or shortness of breath, Disp: 18 g, Rfl: 5    SUMAtriptan (Imitrex) 50 mg tablet, Take 1 tablet (50 mg total) by mouth once as needed for migraine for up to 1 dose, Disp: 9 tablet, Rfl: 0    TiZANidine (ZANAFLEX) 6 MG capsule, Take 1 capsule (6 mg total) by mouth daily at bedtime, Disp: 30 capsule, Rfl: 1    triamcinolone (KENALOG) 0 5 % cream, Apply topically 3 (three) times a day, Disp: 30 g, Rfl: 0    I have reviewed the past medical, social and family history, current medications, allergies, vitals, review of systems and updated this information as appropriate today     Objective:    Vitals:  Blood pressure 130/80, pulse 69, temperature (!) 69 9 °F (21 1 °C), temperature source Temporal, height 5' 8" (1 727 m), weight 106 kg (233 lb), SpO2 98 %  Physical Exam    Neurological Exam    GENERAL:  Cooperative in no acute distress  Well-developed and well-nourished    HEAD and NECK   Head is atraumatic normocephalic with no lesions or masses  Neck is supple with full range of motion    CARDIOVASCULAR  Carotid Arteries-no carotid bruits  NEUROLOGIC:  Mental Status-the patient is awake alert and oriented without aphasia or apraxia  Cranial Nerves: Visual fields are full to confrontation  Discs are flat  Extraocular movements are full without nystagmus  Pupils are 2-1/2 mm and reactive  Face is symmetrical to light touch  Movements of facial expression move symmetrically  Hearing is normal to finger rub bilaterally  Soft palate lifts symmetrically  Shoulder shrug is symmetrical  Tongue is midline without atrophy  Motor: No drift is noted on arm extension   Strength is full in the upper and lower extremities with normal bulk and tone   Sensory: Intact to temperature and vibratory sensation in the upper and lower extremities bilaterally  Cortical function is intact  Coordination: Finger to nose testing is performed accurately  Romberg is negative  Gait reveals a normal base with symmetrical arm swing  Tandem walk is normal   Reflexes:  1+ and symmetrical in the biceps, triceps, brachioradialis, knee jerk and ankle jerk regions  Toes are downgoing            ROS:    Review of Systems   Constitutional: Negative  Negative for appetite change and fever  HENT: Negative  Negative for hearing loss, tinnitus, trouble swallowing and voice change  Eyes: Negative  Negative for photophobia and pain  Respiratory: Negative  Negative for shortness of breath  Cardiovascular: Negative  Negative for palpitations  Gastrointestinal: Negative  Negative for nausea and vomiting  Endocrine: Negative  Negative for cold intolerance  Genitourinary: Negative  Negative for dysuria, frequency and urgency  Musculoskeletal: Positive for back pain and neck pain  Negative for myalgias  Skin: Negative  Negative for rash  Neurological: Negative  Negative for dizziness, tremors, seizures, syncope, facial asymmetry, speech difficulty, weakness, light-headedness, numbness and headaches  Hematological: Negative  Does not bruise/bleed easily  Psychiatric/Behavioral: Negative  Negative for confusion, hallucinations and sleep disturbance

## 2022-05-04 ENCOUNTER — TELEPHONE (OUTPATIENT)
Dept: NEUROLOGY | Facility: CLINIC | Age: 47
End: 2022-05-04

## 2022-07-26 ENCOUNTER — HOSPITAL ENCOUNTER (EMERGENCY)
Facility: HOSPITAL | Age: 47
Discharge: HOME | End: 2022-07-26
Attending: EMERGENCY MEDICINE
Payer: COMMERCIAL

## 2022-07-26 ENCOUNTER — APPOINTMENT (EMERGENCY)
Dept: ULTRASOUND IMAGING | Facility: HOSPITAL | Age: 47
End: 2022-07-26
Payer: COMMERCIAL

## 2022-07-26 VITALS
BODY MASS INDEX: 34.86 KG/M2 | SYSTOLIC BLOOD PRESSURE: 118 MMHG | TEMPERATURE: 97.6 F | HEART RATE: 66 BPM | HEIGHT: 68 IN | RESPIRATION RATE: 18 BRPM | WEIGHT: 230 LBS | DIASTOLIC BLOOD PRESSURE: 70 MMHG | OXYGEN SATURATION: 98 %

## 2022-07-26 DIAGNOSIS — R10.9 ABDOMINAL PAIN, UNSPECIFIED ABDOMINAL LOCATION: Primary | ICD-10-CM

## 2022-07-26 LAB
ALBUMIN SERPL BCP-MCNC: 4.3 G/DL (ref 3.5–5)
ALP SERPL-CCNC: 54 U/L (ref 46–116)
ALT SERPL W P-5'-P-CCNC: 29 U/L (ref 12–78)
ANION GAP SERPL CALCULATED.3IONS-SCNC: 7 MMOL/L (ref 4–13)
AST SERPL W P-5'-P-CCNC: 21 U/L (ref 5–45)
BACTERIA UR QL AUTO: ABNORMAL /HPF
BASOPHILS # BLD AUTO: 0.01 THOUSANDS/ΜL (ref 0–0.1)
BASOPHILS NFR BLD AUTO: 0 % (ref 0–1)
BILIRUB SERPL-MCNC: 0.9 MG/DL (ref 0.2–1)
BILIRUB UR QL STRIP: NEGATIVE
BUN SERPL-MCNC: 14 MG/DL (ref 5–25)
CALCIUM SERPL-MCNC: 8.9 MG/DL (ref 8.3–10.1)
CHLORIDE SERPL-SCNC: 104 MMOL/L (ref 96–108)
CLARITY UR: CLEAR
CO2 SERPL-SCNC: 28 MMOL/L (ref 21–32)
COLOR UR: YELLOW
CREAT SERPL-MCNC: 1.13 MG/DL (ref 0.6–1.3)
EOSINOPHIL # BLD AUTO: 0.07 THOUSAND/ΜL (ref 0–0.61)
EOSINOPHIL NFR BLD AUTO: 2 % (ref 0–6)
ERYTHROCYTE [DISTWIDTH] IN BLOOD BY AUTOMATED COUNT: 12.3 % (ref 11.6–15.1)
GFR SERPL CREATININE-BSD FRML MDRD: 77 ML/MIN/1.73SQ M
GLUCOSE SERPL-MCNC: 127 MG/DL (ref 65–140)
GLUCOSE UR STRIP-MCNC: NEGATIVE MG/DL
HCT VFR BLD AUTO: 42.8 % (ref 36.5–49.3)
HGB BLD-MCNC: 14.3 G/DL (ref 12–17)
HGB UR QL STRIP.AUTO: ABNORMAL
IMM GRANULOCYTES # BLD AUTO: 0 THOUSAND/UL (ref 0–0.2)
IMM GRANULOCYTES NFR BLD AUTO: 0 % (ref 0–2)
KETONES UR STRIP-MCNC: NEGATIVE MG/DL
LEUKOCYTE ESTERASE UR QL STRIP: NEGATIVE
LIPASE SERPL-CCNC: 129 U/L (ref 73–393)
LYMPHOCYTES # BLD AUTO: 0.98 THOUSANDS/ΜL (ref 0.6–4.47)
LYMPHOCYTES NFR BLD AUTO: 32 % (ref 14–44)
MCH RBC QN AUTO: 28.6 PG (ref 26.8–34.3)
MCHC RBC AUTO-ENTMCNC: 33.4 G/DL (ref 31.4–37.4)
MCV RBC AUTO: 86 FL (ref 82–98)
MONOCYTES # BLD AUTO: 0.26 THOUSAND/ΜL (ref 0.17–1.22)
MONOCYTES NFR BLD AUTO: 8 % (ref 4–12)
NEUTROPHILS # BLD AUTO: 1.78 THOUSANDS/ΜL (ref 1.85–7.62)
NEUTS SEG NFR BLD AUTO: 58 % (ref 43–75)
NITRITE UR QL STRIP: NEGATIVE
NON-SQ EPI CELLS URNS QL MICRO: ABNORMAL /HPF
NRBC BLD AUTO-RTO: 0 /100 WBCS
PH UR STRIP.AUTO: 6.5 [PH]
PLATELET # BLD AUTO: 151 THOUSANDS/UL (ref 149–390)
PMV BLD AUTO: 10.2 FL (ref 8.9–12.7)
POTASSIUM SERPL-SCNC: 3.8 MMOL/L (ref 3.5–5.3)
PROT SERPL-MCNC: 7.4 G/DL (ref 6.4–8.4)
PROT UR STRIP-MCNC: NEGATIVE MG/DL
RBC # BLD AUTO: 5 MILLION/UL (ref 3.88–5.62)
RBC #/AREA URNS AUTO: ABNORMAL /HPF
SODIUM SERPL-SCNC: 139 MMOL/L (ref 135–147)
SP GR UR STRIP.AUTO: 1.02 (ref 1–1.03)
UROBILINOGEN UR QL STRIP.AUTO: 0.2 E.U./DL
WBC # BLD AUTO: 3.1 THOUSAND/UL (ref 4.31–10.16)
WBC #/AREA URNS AUTO: ABNORMAL /HPF

## 2022-07-26 PROCEDURE — 85025 COMPLETE CBC W/AUTO DIFF WBC: CPT | Performed by: EMERGENCY MEDICINE

## 2022-07-26 PROCEDURE — 99282 EMERGENCY DEPT VISIT SF MDM: CPT

## 2022-07-26 PROCEDURE — 99284 EMERGENCY DEPT VISIT MOD MDM: CPT

## 2022-07-26 PROCEDURE — 81001 URINALYSIS AUTO W/SCOPE: CPT | Performed by: EMERGENCY MEDICINE

## 2022-07-26 PROCEDURE — 80053 COMPREHEN METABOLIC PANEL: CPT | Performed by: EMERGENCY MEDICINE

## 2022-07-26 PROCEDURE — 83690 ASSAY OF LIPASE: CPT | Performed by: EMERGENCY MEDICINE

## 2022-07-26 PROCEDURE — 36415 COLL VENOUS BLD VENIPUNCTURE: CPT | Performed by: EMERGENCY MEDICINE

## 2022-07-26 PROCEDURE — 76705 ECHO EXAM OF ABDOMEN: CPT

## 2022-07-26 NOTE — DISCHARGE INSTRUCTIONS
Take tums for gastric pain as needed   Follow up with your PCP regarding your decreased WBCs found in labs and ER visit  Return to the ER if you develop intense abdominal pain, cant stop vomiting, chest pain, shortness of breath, difficulty breathing, abdomen becomes hard, rigid or sensitive to thte touch, fevers

## 2022-07-26 NOTE — ED PROVIDER NOTES
History  Chief Complaint   Patient presents with    Abdominal Pain     RUQ abd pain that started at 0700 today  C/o nausea that started x1 hour pta  States he vomited and now is not nauseous  States "I have this weird sensation at the end of my penis like I have to pee  And I'm burping a lot " Denies meds for pain  Denies other symptoms     57 y/o male with no pertinent PMH presents with RUQ pain since 10 AM this morning  Patient states he woke up this morning and was going about his day when he got a sharp sensation in his right upper quadrant  He says it was a 10/10, was a sharp, stabbing ache, and did not radiate at all  He also admits to feeling nausea and having cold sweats with the episode  States he tried to eat, have a bowel movement, urinate but was unable to  States he did vomit and after that he began to feel better as well as belching  He states the pain is almost fully gone now  He denies any recent changes in urination or bowel movements, chest pain, shortness of breath, URI symptoms  Denies trying any medications for this  Never had this before  History provided by:  Patient   used: No    Abdominal Pain  Pain location:  RUQ  Pain quality: sharp and stabbing    Pain radiates to:  Does not radiate  Pain severity:  Severe  Onset quality:  Sudden  Timing:  Intermittent  Progression:  Resolved  Chronicity:  New  Ineffective treatments:  None tried  Associated symptoms: chills and nausea    Associated symptoms: no anorexia, no chest pain, no constipation, no diarrhea, no dysuria, no fever, no hematemesis, no hematuria, no shortness of breath and no vomiting        Prior to Admission Medications   Prescriptions Last Dose Informant Patient Reported? Taking?    SUMAtriptan (Imitrex) 50 mg tablet   No No   Sig: Take 1 tablet (50 mg total) by mouth once as needed for migraine for up to 1 dose   TiZANidine (ZANAFLEX) 6 MG capsule   No No   Sig: Take 1 capsule (6 mg total) by mouth daily at bedtime   albuterol (PROVENTIL HFA,VENTOLIN HFA) 90 mcg/act inhaler   No No   Sig: Inhale 2 puffs every 6 (six) hours as needed for wheezing or shortness of breath   triamcinolone (KENALOG) 0 5 % cream   No No   Sig: Apply topically 3 (three) times a day      Facility-Administered Medications: None       Past Medical History:   Diagnosis Date    Environmental allergies        Past Surgical History:   Procedure Laterality Date    CLOSED MANIPULATION SHOULDER Left     HAND SURGERY Right     fracture with screw placement    MO EXCISION TURBINATE,SUBMUCOUS N/A 2020    Procedure: SUBMUCOSAL RESECTION OF TURBINATES WITH CADAVERIC RIB GRAFT;  Surgeon: Sunday Gallo MD;  [facility];  Service: ENT    MO REPAIR NASAL CAVITY STENOSIS N/A 2020    Procedure: REPAIR VESTIBULAR STENOSIS;  Surgeon: Sunday Gallo MD;  [facility];  Service: ENT    MO REPAIR OF NASAL SEPTUM N/A 2020    Procedure: SEPTOPLASTY;  Surgeon: Sunday Gallo MD;  [facility];  Service: ENT       Family History   Problem Relation Age of Onset    Arthritis Mother     Diabetes Mother      I have reviewed and agree with the history as documented  E-Cigarette/Vaping    E-Cigarette Use Never User      E-Cigarette/Vaping Substances    Nicotine No     THC No     CBD No      Social History     Tobacco Use    Smoking status: Former Smoker     Quit date: 2000     Years since quittin 2    Smokeless tobacco: Former User     Types: Chew     Quit date:     Tobacco comment: quit 2016   Vaping Use    Vaping Use: Never used   Substance Use Topics    Alcohol use: Yes     Comment: occasional    Drug use: Never       Review of Systems   Constitutional: Positive for chills  Negative for fever  HENT: Negative for congestion  Respiratory: Negative for chest tightness and shortness of breath  Cardiovascular: Negative for chest pain     Gastrointestinal: Positive for abdominal pain and nausea  Negative for anorexia, blood in stool, constipation, diarrhea, hematemesis and vomiting  Genitourinary: Positive for difficulty urinating  Negative for dysuria and hematuria  Skin: Negative for color change  Neurological: Negative for headaches  Psychiatric/Behavioral: Negative for behavioral problems and sleep disturbance  All other systems reviewed and are negative  Physical Exam  Physical Exam  Vitals and nursing note reviewed  Constitutional:       General: He is awake  Appearance: Normal appearance  He is well-developed  Comments: Patient seated comfortably in exam room    HENT:      Head: Normocephalic and atraumatic  Right Ear: External ear normal       Left Ear: External ear normal       Nose: Nose normal    Eyes:      General: No scleral icterus  Extraocular Movements: Extraocular movements intact  Cardiovascular:      Rate and Rhythm: Normal rate and regular rhythm  Heart sounds: Normal heart sounds, S1 normal and S2 normal  No murmur heard  No gallop  Pulmonary:      Effort: Pulmonary effort is normal       Breath sounds: Normal breath sounds  No decreased breath sounds, wheezing, rhonchi or rales  Abdominal:      General: Abdomen is flat  Bowel sounds are normal       Palpations: Abdomen is soft  Tenderness: There is abdominal tenderness in the right upper quadrant  There is no guarding or rebound  Negative signs include Barnhart's sign  Comments: Tenderness to palpation of RUQ but barnhart's sign negative  No rebound, guarding, rigidity  Musculoskeletal:         General: Normal range of motion  Cervical back: Normal range of motion  Skin:     General: Skin is warm and dry  Neurological:      General: No focal deficit present  Mental Status: He is alert     Psychiatric:         Attention and Perception: Attention and perception normal          Mood and Affect: Mood normal          Behavior: Behavior normal  Behavior is cooperative           Vital Signs  ED Triage Vitals [07/26/22 1114]   Temperature Pulse Respirations Blood Pressure SpO2   97 6 °F (36 4 °C) 66 18 145/93 100 %      Temp Source Heart Rate Source Patient Position - Orthostatic VS BP Location FiO2 (%)   Temporal Monitor Sitting Left arm --      Pain Score       3           Vitals:    07/26/22 1114 07/26/22 1200   BP: 145/93 118/70   Pulse: 66 66   Patient Position - Orthostatic VS: Sitting          Visual Acuity      ED Medications  Medications - No data to display    Diagnostic Studies  Results Reviewed     Procedure Component Value Units Date/Time    Urine Microscopic [[id number]]  (Abnormal) Collected: 07/26/22 1322    Lab Status: Final result Specimen: Urine, Clean Catch Updated: 07/26/22 1425     RBC, UA 10-20 /hpf      WBC, UA 0-1 /hpf      Epithelial Cells Occasional /hpf      Bacteria, UA Occasional /hpf     UA w Reflex to Microscopic w Reflex to Culture [[id number]]  (Abnormal) Collected: 07/26/22 1322    Lab Status: Final result Specimen: Urine, Clean Catch Updated: 07/26/22 1403     Color, UA Yellow     Clarity, UA Clear     Specific [place], UA 1 020     pH, UA 6 5     Leukocytes, UA Negative     Nitrite, UA Negative     Protein, UA Negative mg/dl      Glucose, UA Negative mg/dl      Ketones, UA Negative mg/dl      Urobilinogen, UA 0 2 E U /dl      Bilirubin, UA Negative     Occult Blood, UA Large    Comprehensive metabolic panel [[id number]] Collected: 07/26/22 1128    Lab Status: Final result Specimen: Blood from Arm, Right Updated: 07/26/22 1158     Sodium 139 mmol/L      Potassium 3 8 mmol/L      Chloride 104 mmol/L      CO2 28 mmol/L      ANION GAP 7 mmol/L      BUN 14 mg/dL      Creatinine 1 13 mg/dL      Glucose 127 mg/dL      Calcium 8 9 mg/dL      AST 21 U/L      ALT 29 U/L      Alkaline Phosphatase 54 U/L      Total Protein 7 4 g/dL      Albumin 4 3 g/dL      Total Bilirubin 0 90 mg/dL      eGFR 77 ml/min/1 73sq m     Narrative:      National Kidney Disease Foundation guidelines for Chronic Kidney Disease (CKD):     Stage 1 with normal or high GFR (GFR > 90 mL/min/1 73 square meters)    Stage 2 Mild CKD (GFR = 60-89 mL/min/1 73 square meters)    Stage 3A Moderate CKD (GFR = 45-59 mL/min/1 73 square meters)    Stage 3B Moderate CKD (GFR = 30-44 mL/min/1 73 square meters)    Stage 4 Severe CKD (GFR = 15-29 mL/min/1 73 square meters)    Stage 5 End Stage CKD (GFR <15 mL/min/1 73 square meters)  Note: GFR calculation is accurate only with a steady state creatinine    Lipase [[id number]]  (Normal) Collected: 07/26/22 1128    Lab Status: Final result Specimen: Blood from Arm, Right Updated: 07/26/22 1158     Lipase 129 u/L     CBC and differential [[id number]]  (Abnormal) Collected: 07/26/22 1128    Lab Status: Final result Specimen: Blood from Arm, Right Updated: 07/26/22 1133     WBC 3 10 Thousand/uL      RBC 5 00 Million/uL      Hemoglobin 14 3 g/dL      Hematocrit 42 8 %      MCV 86 fL      MCH 28 6 pg      MCHC 33 4 g/dL      RDW 12 3 %      MPV 10 2 fL      Platelets 710 Thousands/uL      nRBC 0 /100 WBCs      Neutrophils Relative 58 %      Immat GRANS % 0 %      Lymphocytes Relative 32 %      Monocytes Relative 8 %      Eosinophils Relative 2 %      Basophils Relative 0 %      Neutrophils Absolute 1 78 Thousands/µL      Immature Grans Absolute 0 00 Thousand/uL      Lymphocytes Absolute 0 98 Thousands/µL      Monocytes Absolute 0 26 Thousand/µL      Eosinophils Absolute 0 07 Thousand/µL      Basophils Absolute 0 01 Thousands/µL                  US right upper quadrant   Final Result by Keegan Harper MD (07/26 4958)      No acute abnormality        Workstation performed: [id number]                    Procedures  Procedures         ED Course                                             MDM  Number of Diagnoses or Management Options  Abdominal pain, unspecified abdominal location: new and requires workup  Diagnosis management comments: 55 y/o male here for episode of RUQ pain that started at 10 am today  Patient said it was sharp, stabbing and was a 10/10  It did not radiate to his back or chest  Was associated with nausea and belching  Has never had this before  No chest pain, shortness of breath, fevers, chills  Did not try any meds  Pain mostly resolved in ER, just a dull ache now  Vitals stable in ER  On exam there was RUQ tenderness but murphys sign negative  No peritoneal signs  Differential cholecystitis, biliary colic, gas pains, gastritis  Ordered CBC, CMP, Lipase, UA, RUQ abdominal ultrasound  CMP, Lipase normal  CBC showed decreased WBCs  UA showed blood in the urine  RUQ abdominal ultrasound negative  Patient was informed of the blood in the urine and decreased WBCs and instructed to follow up with his PCP  He was given instructions to take tums for his pains  He was given strict return to ED precautions both verbally and in discharge papers  Pt agreed to this plan  Amount and/or Complexity of Data Reviewed  Clinical lab tests: ordered and reviewed  Tests in the radiology section of CPT®: ordered and reviewed    Risk of Complications, Morbidity, and/or Mortality  Presenting problems: low  Diagnostic procedures: minimal  Management options: minimal    Patient Progress  Patient progress: stable      Disposition  Final diagnoses:   Abdominal pain, unspecified abdominal location     Time reflects when diagnosis was documented in both MDM as applicable and the Disposition within this note     Time User Action Codes Description Comment    7/26/2022  2:11 PM Douglas Moy Add [R10 9] Abdominal pain, unspecified abdominal location       ED Disposition     ED Disposition   Discharge    Condition   Stable    Date/Time   Tue Jul 26, 2022  2:10 PM    Kylee Fay discharge to home/self care                 Follow-up Information     Follow up With Specialties Details Why [id number] [facility] Schedule an appointment as soon as possible for a visit  for decreased WBCs and blood in urine [address] [place] [address] [place] [address] Emergency Department Emergency Medicine Go to  if you develop intense abdominal pain, cant stop vomiting, chest pain, shortness of breath, difficulty breathing, abdomen becomes hard, rigid or sensitive to thte touch, fevers [address] [place],[address] Emergency Department, [address] [facility] Dina Noyola Luige E[address]          Discharge Medication List as of 7/26/2022  2:15 PM      CONTINUE these medications which have NOT CHANGED    Details   albuterol (PROVENTIL HFA,VENTOLIN HFA) 90 mcg/act inhaler Inhale 2 puffs every 6 (six) hours as needed for wheezing or shortness of breath, Starting Fri 12/3/2021, Normal      SUMAtriptan (Imitrex) 50 mg tablet Take 1 tablet (50 mg total) by mouth once as needed for migraine for up to 1 dose, Starting Fri 12/3/2021, Normal      TiZANidine (ZANAFLEX) 6 MG capsule Take 1 capsule (6 mg total) by mouth daily at bedtime, Starting Fri 12/3/2021, Normal      triamcinolone (KENALOG) 0 5 % cream Apply topically 3 (three) times a day, Starting Fri 12/3/2021, Normal             No discharge procedures on file      PDMP Review       Value Time User    PDMP Reviewed  Yes 5/21/2020  3:41 PM Xena Leos MD          ED Provider  Electronically Signed by           Harpreet Garcia PA-C  07/26/22 5177

## 2022-08-10 DIAGNOSIS — G43.109 OCULAR MIGRAINE: ICD-10-CM

## 2022-08-10 RX ORDER — SUMATRIPTAN 50 MG/1
50 TABLET, FILM COATED ORAL ONCE AS NEEDED
Qty: 9 TABLET | Refills: 0 | Status: SHIPPED | OUTPATIENT
Start: 2022-08-10

## 2023-07-12 ENCOUNTER — OFFICE VISIT (OUTPATIENT)
Dept: FAMILY MEDICINE CLINIC | Facility: CLINIC | Age: 48
End: 2023-07-12
Payer: COMMERCIAL

## 2023-07-12 VITALS
OXYGEN SATURATION: 100 % | BODY MASS INDEX: 33.65 KG/M2 | WEIGHT: 222 LBS | HEIGHT: 68 IN | TEMPERATURE: 97.9 F | SYSTOLIC BLOOD PRESSURE: 124 MMHG | DIASTOLIC BLOOD PRESSURE: 88 MMHG | HEART RATE: 94 BPM

## 2023-07-12 DIAGNOSIS — Z13.220 NEED FOR LIPID SCREENING: ICD-10-CM

## 2023-07-12 DIAGNOSIS — D72.819 LEUKOPENIA, UNSPECIFIED TYPE: ICD-10-CM

## 2023-07-12 DIAGNOSIS — G43.109 OCULAR MIGRAINE: ICD-10-CM

## 2023-07-12 DIAGNOSIS — Z13.1 SCREENING FOR DIABETES MELLITUS: ICD-10-CM

## 2023-07-12 DIAGNOSIS — Z00.00 ENCOUNTER FOR HEALTH MAINTENANCE EXAMINATION: Primary | ICD-10-CM

## 2023-07-12 DIAGNOSIS — Z12.11 SCREENING FOR COLON CANCER: ICD-10-CM

## 2023-07-12 PROCEDURE — 99396 PREV VISIT EST AGE 40-64: CPT | Performed by: FAMILY MEDICINE

## 2023-07-12 RX ORDER — SUMATRIPTAN 50 MG/1
50 TABLET, FILM COATED ORAL ONCE AS NEEDED
Qty: 9 TABLET | Refills: 0 | Status: SHIPPED | OUTPATIENT
Start: 2023-07-12

## 2023-07-12 NOTE — PROGRESS NOTES
Name: Kimmy Cortez      : 1975      MRN: [id number]  Encounter Provider: Corinne Barr, MD  Encounter Date: 2023   Encounter department: [address] [place] [address]     1. Encounter for health maintenance examination  Normal exam    2. Ocular migraine  -     SUMAtriptan (Imitrex) 50 mg tablet; Take 1 tablet (50 mg total) by mouth once as needed for migraine for up to 1 dose    3. Screening for colon cancer  -     Ambulatory referral to Gastroenterology; Future    4. Screening for diabetes mellitus  -     Comprehensive metabolic panel; Future    5. Need for lipid screening  -     Lipid panel; Future    6. Leukopenia, unspecified type  -     CBC and differential; Future    Follow up in 1 year or as needed       Subjective     Patient is here for a yearly exam.  Has a Hx of migraine headaches takes imitrex as needed. Had borderline low wbc count in the past denies any symptoms such as fever, chills or weight loss. Review of Systems   Constitutional: Negative for activity change, appetite change, fatigue and fever. HENT: Negative for congestion and ear discharge. Respiratory: Negative for cough and shortness of breath. Cardiovascular: Negative for chest pain and palpitations. Gastrointestinal: Negative for diarrhea and nausea. Musculoskeletal: Negative for arthralgias and back pain. Skin: Negative for color change and rash. Neurological: Negative for dizziness and headaches. Psychiatric/Behavioral: Negative for agitation and behavioral problems.        Past Medical History:   Diagnosis Date   • Environmental allergies      Past Surgical History:   Procedure Laterality Date   • CLOSED MANIPULATION SHOULDER Left    • HAND SURGERY Right     fracture with screw placement   • RI REPAIR NASAL VESTIBULAR STENOSIS N/A 2020    Procedure: REPAIR VESTIBULAR STENOSIS;  Surgeon: Ernesto Mary MD;  [facility];  Service: ENT   • RI SEPTOPLASTY/SUBMUCOUS RESECJ W/WO CARTILAGE GRF N/A 5/21/2020    Procedure: SEPTOPLASTY;  Surgeon: Summer Smart MD;  [facility];  Service: ENT   • CO SUBMUCOUS [place] PRTL/COMPL N/A 5/21/2020    Procedure: SUBMUCOSAL RESECTION OF TURBINATES WITH CADAVERIC RIB GRAFT;  Surgeon: Summer Smart MD;  [facility];  Service: ENT     Family History   Problem Relation Age of Onset   • Arthritis Mother    • Diabetes Mother      Social History     Socioeconomic History   • Marital status: /Civil Union     Spouse name: None   • Number of children: None   • Years of education: None   • Highest education level: None   Occupational History   • None   Tobacco Use   • Smoking status: Former   • Smokeless tobacco: Former     Types: Chew     Quit date: 2016   • Tobacco comments:     quit 2016   Vaping Use   • Vaping Use: Never used   Substance and Sexual Activity   • Alcohol use: Yes     Comment: occasional   • Drug use: Never   • Sexual activity: None   Other Topics Concern   • None   Social History Narrative    Consumes 2 cups of coffee per day     Social Determinants of Health     Financial Resource Strain: Not on file   Food Insecurity: Not on file   Transportation Needs: Not on file   Physical Activity: Not on file   Stress: Not on file   Social Connections: Not on file   Intimate Partner Violence: Not on file   Housing Stability: Not on file     Current Outpatient Medications on File Prior to Visit   Medication Sig   • TiZANidine (ZANAFLEX) 6 MG capsule Take 1 capsule (6 mg total) by mouth daily at bedtime   • [DISCONTINUED] SUMAtriptan (Imitrex) 50 mg tablet Take 1 tablet (50 mg total) by mouth once as needed for migraine for up to 1 dose   • [DISCONTINUED] albuterol (PROVENTIL HFA,VENTOLIN HFA) 90 mcg/act inhaler Inhale 2 puffs every 6 (six) hours as needed for wheezing or shortness of breath (Patient not taking: Reported on 7/12/2023)   • [DISCONTINUED] triamcinolone (KENALOG) 0.5 % cream Apply topically 3 (three) times a day (Patient not taking: Reported on 7/12/2023)     Allergies   Allergen Reactions   • Hydrocodone Itching     Immunization History   Administered Date(s) Administered   • Td (adult), adsorbed 03/31/2009   • Tdap 10/30/2015       Objective     /88 (BP Location: Left arm, Patient Position: Sitting, Cuff Size: Large)   Pulse 94   Temp 97.9 °F (36.6 °C)   Ht 5' 8" (1.727 m)   Wt 101 kg (222 lb)   SpO2 100%   BMI 33.75 kg/m²     Physical Exam  Constitutional:       General: He is not in acute distress. Appearance: He is well-developed. He is not diaphoretic. Eyes:      General: No scleral icterus. Pupils: Pupils are equal, round, and reactive to light. Cardiovascular:      Rate and Rhythm: Normal rate and regular rhythm. Heart sounds: Normal heart sounds. No murmur heard. Pulmonary:      Effort: Pulmonary effort is normal. No respiratory distress. Breath sounds: Normal breath sounds. No wheezing. Abdominal:      General: Bowel sounds are normal. There is no distension. Palpations: Abdomen is soft. Tenderness: There is no abdominal tenderness. Skin:     General: Skin is warm and dry. Findings: No rash. Neurological:      Mental Status: He is alert and oriented to person, place, and time.        Leif Stevenson MD

## 2023-07-13 ENCOUNTER — APPOINTMENT (OUTPATIENT)
Dept: LAB | Facility: CLINIC | Age: 48
End: 2023-07-13
Payer: COMMERCIAL

## 2023-07-13 DIAGNOSIS — Z13.1 SCREENING FOR DIABETES MELLITUS: ICD-10-CM

## 2023-07-13 DIAGNOSIS — Z13.220 NEED FOR LIPID SCREENING: ICD-10-CM

## 2023-07-13 DIAGNOSIS — D72.819 LEUKOPENIA, UNSPECIFIED TYPE: ICD-10-CM

## 2023-07-13 LAB
ALBUMIN SERPL BCP-MCNC: 4.2 G/DL (ref 3.5–5)
ALP SERPL-CCNC: 53 U/L (ref 46–116)
ALT SERPL W P-5'-P-CCNC: 24 U/L (ref 12–78)
ANION GAP SERPL CALCULATED.3IONS-SCNC: 5 MMOL/L
AST SERPL W P-5'-P-CCNC: 20 U/L (ref 5–45)
BASOPHILS # BLD AUTO: 0.04 THOUSANDS/ÂΜL (ref 0–0.1)
BASOPHILS NFR BLD AUTO: 1 % (ref 0–1)
BILIRUB SERPL-MCNC: 0.84 MG/DL (ref 0.2–1)
BUN SERPL-MCNC: 23 MG/DL (ref 5–25)
CALCIUM SERPL-MCNC: 9.4 MG/DL (ref 8.3–10.1)
CHLORIDE SERPL-SCNC: 107 MMOL/L (ref 96–108)
CHOLEST SERPL-MCNC: 138 MG/DL
CO2 SERPL-SCNC: 26 MMOL/L (ref 21–32)
CREAT SERPL-MCNC: 1.03 MG/DL (ref 0.6–1.3)
EOSINOPHIL # BLD AUTO: 0.29 THOUSAND/ÂΜL (ref 0–0.61)
EOSINOPHIL NFR BLD AUTO: 7 % (ref 0–6)
ERYTHROCYTE [DISTWIDTH] IN BLOOD BY AUTOMATED COUNT: 12.5 % (ref 11.6–15.1)
GFR SERPL CREATININE-BSD FRML MDRD: 86 ML/MIN/1.73SQ M
GLUCOSE P FAST SERPL-MCNC: 86 MG/DL (ref 65–99)
HCT VFR BLD AUTO: 43.4 % (ref 36.5–49.3)
HDLC SERPL-MCNC: 51 MG/DL
HGB BLD-MCNC: 14.3 G/DL (ref 12–17)
IMM GRANULOCYTES # BLD AUTO: 0 THOUSAND/UL (ref 0–0.2)
IMM GRANULOCYTES NFR BLD AUTO: 0 % (ref 0–2)
LDLC SERPL CALC-MCNC: 78 MG/DL (ref 0–100)
LYMPHOCYTES # BLD AUTO: 1.2 THOUSANDS/ÂΜL (ref 0.6–4.47)
LYMPHOCYTES NFR BLD AUTO: 30 % (ref 14–44)
MCH RBC QN AUTO: 28.5 PG (ref 26.8–34.3)
MCHC RBC AUTO-ENTMCNC: 32.9 G/DL (ref 31.4–37.4)
MCV RBC AUTO: 87 FL (ref 82–98)
MONOCYTES # BLD AUTO: 0.5 THOUSAND/ÂΜL (ref 0.17–1.22)
MONOCYTES NFR BLD AUTO: 13 % (ref 4–12)
NEUTROPHILS # BLD AUTO: 1.93 THOUSANDS/ÂΜL (ref 1.85–7.62)
NEUTS SEG NFR BLD AUTO: 49 % (ref 43–75)
NONHDLC SERPL-MCNC: 87 MG/DL
NRBC BLD AUTO-RTO: 0 /100 WBCS
PLATELET # BLD AUTO: 177 THOUSANDS/UL (ref 149–390)
PMV BLD AUTO: 10.5 FL (ref 8.9–12.7)
POTASSIUM SERPL-SCNC: 4.2 MMOL/L (ref 3.5–5.3)
PROT SERPL-MCNC: 6.8 G/DL (ref 6.4–8.4)
RBC # BLD AUTO: 5.02 MILLION/UL (ref 3.88–5.62)
SODIUM SERPL-SCNC: 138 MMOL/L (ref 135–147)
TRIGL SERPL-MCNC: 43 MG/DL
WBC # BLD AUTO: 3.96 THOUSAND/UL (ref 4.31–10.16)

## 2023-07-13 PROCEDURE — 36415 COLL VENOUS BLD VENIPUNCTURE: CPT

## 2023-07-13 PROCEDURE — 85025 COMPLETE CBC W/AUTO DIFF WBC: CPT

## 2023-07-13 PROCEDURE — 80053 COMPREHEN METABOLIC PANEL: CPT

## 2023-07-13 PROCEDURE — 80061 LIPID PANEL: CPT

## 2023-09-10 PROBLEM — Z00.00 ENCOUNTER FOR HEALTH MAINTENANCE EXAMINATION: Status: RESOLVED | Noted: 2023-07-12 | Resolved: 2023-09-10

## 2023-09-19 ENCOUNTER — TELEPHONE (OUTPATIENT)
Dept: GASTROENTEROLOGY | Facility: CLINIC | Age: 48
End: 2023-09-19

## 2023-09-19 NOTE — TELEPHONE ENCOUNTER
09/19/23  Screened by: Milind Helton    Referring Provider     Pre- Screening: There is no height or weight on file to calculate BMI. Has patient been referred for a routine screening Colonoscopy? yes  Is the patient between 43-73 years old? yes      Previous Colonoscopy no   If yes:    Date:     Facility:     Reason:       SCHEDULING STAFF: If the patient is between 45yrs-49yrs, please advise patient to confirm benefits/coverage with their insurance company for a routine screening colonoscopy, some insurance carriers will only cover at [address] [place] [address] or older. If the patient is over 66years old, please schedule an office visit. Does the patient want to see a Gastroenterologist prior to their procedure OR are they having any GI symptoms? no    Has the patient been hospitalized or had abdominal surgery in the past 6 months? no    Does the patient use supplemental oxygen? no    Does the patient take Coumadin, Lovenox, Plavix, Elliquis, Xarelto, or other blood thinning medication? no    Has the patient had a stroke, cardiac event, or stent placed in the past year? no    SCHEDULING STAFF: If patient answers NO to above questions, then schedule procedure. If patient answers YES to above questions, then schedule office appointment. If patient is between 45yrs - 49yrs, please advise patient that we will have to confirm benefits & coverage with their insurance company for a routine screening colonoscopy.

## 2023-09-20 ENCOUNTER — PREP FOR PROCEDURE (OUTPATIENT)
Dept: GASTROENTEROLOGY | Facility: CLINIC | Age: 48
End: 2023-09-20

## 2023-09-20 DIAGNOSIS — Z12.11 SCREENING FOR COLON CANCER: Primary | ICD-10-CM

## 2023-09-20 NOTE — TELEPHONE ENCOUNTER
Spoke with the patient and he scheduled his colonoscopy. Prep instructions sent via [employer].     Scheduled date of colonoscopy (as of today):10/6/23  Physician performing colonoscopy:Robyn  Location of colonoscopy:[facility]  Bowel prep reviewed with patient:miralax/dulcolax  Instructions reviewed with patient by:Lawanda BHAKTA  Clearances: none

## 2023-10-03 ENCOUNTER — TELEPHONE (OUTPATIENT)
Age: 48
End: 2023-10-03

## 2023-10-03 NOTE — TELEPHONE ENCOUNTER
Patient contacted office requesting prep directions. Miralax/dulcolax directions sent via [employer] as per patient request.     Procedure directions reviewed in full detail. Patient expressed understanding and will contact office with any further questions or concerns.

## 2023-10-04 ENCOUNTER — TELEPHONE (OUTPATIENT)
Age: 48
End: 2023-10-04

## 2023-10-04 NOTE — TELEPHONE ENCOUNTER
ashtyn Peoples reminding patient of Colonoscopy on 10/06/23. Reminded patient  1) all liquid diet on Thursday, no red,blue,purple drinks, No solids  2) needs a ride to and from hospital  3) hospital will call between 2-6 with arrival time.

## 2023-10-06 ENCOUNTER — ANESTHESIA (OUTPATIENT)
Dept: GASTROENTEROLOGY | Facility: HOSPITAL | Age: 48
End: 2023-10-06

## 2023-10-06 ENCOUNTER — HOSPITAL ENCOUNTER (OUTPATIENT)
Dept: GASTROENTEROLOGY | Facility: HOSPITAL | Age: 48
Setting detail: OUTPATIENT SURGERY
End: 2023-10-06
Attending: INTERNAL MEDICINE
Payer: COMMERCIAL

## 2023-10-06 ENCOUNTER — ANESTHESIA EVENT (OUTPATIENT)
Dept: GASTROENTEROLOGY | Facility: HOSPITAL | Age: 48
End: 2023-10-06

## 2023-10-06 VITALS
HEART RATE: 64 BPM | OXYGEN SATURATION: 97 % | DIASTOLIC BLOOD PRESSURE: 68 MMHG | RESPIRATION RATE: 15 BRPM | TEMPERATURE: 97.7 F | SYSTOLIC BLOOD PRESSURE: 109 MMHG

## 2023-10-06 DIAGNOSIS — Z12.11 SCREENING FOR COLON CANCER: ICD-10-CM

## 2023-10-06 RX ORDER — LIDOCAINE HYDROCHLORIDE 20 MG/ML
INJECTION, SOLUTION EPIDURAL; INFILTRATION; INTRACAUDAL; PERINEURAL AS NEEDED
Status: DISCONTINUED | OUTPATIENT
Start: 2023-10-06 | End: 2023-10-06

## 2023-10-06 RX ORDER — PROPOFOL 10 MG/ML
INJECTION, EMULSION INTRAVENOUS AS NEEDED
Status: DISCONTINUED | OUTPATIENT
Start: 2023-10-06 | End: 2023-10-06

## 2023-10-06 RX ADMIN — Medication 40 MG: at 12:21

## 2023-10-06 RX ADMIN — PROPOFOL 30 MG: 10 INJECTION, EMULSION INTRAVENOUS at 12:22

## 2023-10-06 RX ADMIN — LIDOCAINE HYDROCHLORIDE 100 MG: 20 INJECTION, SOLUTION EPIDURAL; INFILTRATION; INTRACAUDAL; PERINEURAL at 12:18

## 2023-10-06 RX ADMIN — PROPOFOL 30 MG: 10 INJECTION, EMULSION INTRAVENOUS at 12:20

## 2023-10-06 RX ADMIN — PROPOFOL 150 MG: 10 INJECTION, EMULSION INTRAVENOUS at 12:18

## 2023-10-06 NOTE — ANESTHESIA POSTPROCEDURE EVALUATION
Post-Op Assessment Note    CV Status:  Stable  Pain Score: 0    Pain management: adequate     Mental Status:  Sleepy   Hydration Status:  Stable   PONV Controlled:  None   Airway Patency:  Patent      Post Op Vitals Reviewed: Yes            No notable events documented.

## 2023-10-06 NOTE — ANESTHESIA PREPROCEDURE EVALUATION
Procedure:  COLONOSCOPY    Relevant Problems   CARDIO   (+) Ocular migraine      NEURO/PSYCH   (+) Episodic tension-type headache, not intractable   (+) Ocular migraine      PULMONARY   (+) Exercise-induced asthma      Nervous and Auditory   (+) Traumatic brain injury, closed (HCC)        Physical Exam    Airway    Mallampati score: I  TM Distance: >3 FB  Neck ROM: full     Dental       Cardiovascular      Pulmonary      Other Findings        Anesthesia Plan  ASA Score- 2     Anesthesia Type- IV sedation with anesthesia with ASA Monitors.         Additional Monitors:   Airway Plan:           Plan Factors-Exercise tolerance (METS): >4 METS.    Chart reviewed. Existing labs reviewed. Patient summary reviewed.    Patient is not a current smoker.         Induction- intravenous.    Postoperative Plan-     Informed Consent- Anesthetic plan and risks discussed with patient.  I personally reviewed this patient with the CRNA. Discussed and agreed on the Anesthesia Plan with the CRNA..

## 2023-10-06 NOTE — H&P
History and Physical - [facility]  Aaron Fernandes 53 y.o. male MRN: [id number]                  HPI: Aaron Fernandes is a 53y.o. year old male who presents for colonoscopy for colon cancer screening      REVIEW OF SYSTEMS: Per the HPI, and otherwise unremarkable.     Historical Information   Past Medical History:   Diagnosis Date   • Environmental allergies    • Migraines      Past Surgical History:   Procedure Laterality Date   • CLOSED MANIPULATION SHOULDER Left    • HAND SURGERY Right     fracture with screw placement   • AR REPAIR NASAL VESTIBULAR STENOSIS N/A 5/21/2020    Procedure: REPAIR VESTIBULAR STENOSIS;  Surgeon: Ming Morales MD;  [facility];  Service: ENT   • AR SEPTOPLASTY/SUBMUCOUS RESECJ W/WO CARTILAGE GRF N/A 5/21/2020    Procedure: SEPTOPLASTY;  Surgeon: Ming Morales MD;  [facility];  Service: ENT   • AR SUBMUCOUS [place] PRTL/COMPL N/A 5/21/2020    Procedure: SUBMUCOSAL RESECTION OF TURBINATES WITH CADAVERIC RIB GRAFT;  Surgeon: Ming Morales MD;  [facility];  Service: ENT     Social History   Social History     Substance and Sexual Activity   Alcohol Use Yes    Comment: occasional     Social History     Substance and Sexual Activity   Drug Use Never     Social History     Tobacco Use   Smoking Status Former   Smokeless Tobacco Former   • Types: Chew   • Quit date: 2016   Tobacco Comments    quit 2016     Family History   Problem Relation Age of Onset   • Arthritis Mother    • Diabetes Mother        Meds/Allergies       Current Outpatient Medications:   •  SUMAtriptan (Imitrex) 50 mg tablet  •  TiZANidine (ZANAFLEX) 6 MG capsule    Allergies   Allergen Reactions   • Hydrocodone Itching       Objective     /77   Pulse 64   Temp (!) 97.2 °F (36.2 °C) (Temporal)   Resp 20   SpO2 97%       PHYSICAL EXAM    Gen: NAD  Head: NCAT  CV: RRR  CHEST: Clear  ABD: soft, NT/ND  EXT: no edema      ASSESSMENT/PLAN:  Aaron Fernandes is a 53y.o. year old male who presents for colonoscopy for colon cancer screening. The patient is stable and optimized for the procedure, we reviewed risk and benefits. Risk include but not limited to infection, bleeding, perforation and missing a lesion.

## 2024-10-22 ENCOUNTER — APPOINTMENT (EMERGENCY)
Dept: CT IMAGING | Facility: HOSPITAL | Age: 49
End: 2024-10-22
Payer: COMMERCIAL

## 2024-10-22 ENCOUNTER — HOSPITAL ENCOUNTER (OUTPATIENT)
Facility: HOSPITAL | Age: 49
Setting detail: OBSERVATION
Discharge: HOME | End: 2024-10-23
Attending: FAMILY MEDICINE | Admitting: INTERNAL MEDICINE
Payer: COMMERCIAL

## 2024-10-22 ENCOUNTER — APPOINTMENT (OUTPATIENT)
Dept: MRI IMAGING | Facility: HOSPITAL | Age: 49
End: 2024-10-22
Payer: COMMERCIAL

## 2024-10-22 ENCOUNTER — APPOINTMENT (OUTPATIENT)
Dept: NON INVASIVE DIAGNOSTICS | Facility: HOSPITAL | Age: 49
End: 2024-10-22
Payer: COMMERCIAL

## 2024-10-22 DIAGNOSIS — R29.90 STROKE-LIKE SYMPTOMS: ICD-10-CM

## 2024-10-22 DIAGNOSIS — R20.0 RIGHT ARM NUMBNESS: Primary | ICD-10-CM

## 2024-10-22 DIAGNOSIS — Q21.0 VSD (VENTRICULAR SEPTAL DEFECT): ICD-10-CM

## 2024-10-22 PROBLEM — R91.1 PULMONARY NODULE: Status: ACTIVE | Noted: 2024-10-22

## 2024-10-22 LAB
2HR DELTA HS TROPONIN: 0 NG/L
ANION GAP SERPL CALCULATED.3IONS-SCNC: 6 MMOL/L (ref 4–13)
AORTIC ROOT: 3.7 CM
APICAL FOUR CHAMBER EJECTION FRACTION: 57 %
APTT PPP: 28 SECONDS (ref 23–34)
AV LVOT MEAN GRADIENT: 2 MMHG
AV LVOT PEAK GRADIENT: 3 MMHG
AV REGURGITATION PRESSURE HALF TIME: 1193 MS
BSA FOR ECHO PROCEDURE: 2.18 M2
BUN SERPL-MCNC: 16 MG/DL (ref 5–25)
CALCIUM SERPL-MCNC: 9.6 MG/DL (ref 8.4–10.2)
CARDIAC TROPONIN I PNL SERPL HS: 4 NG/L
CARDIAC TROPONIN I PNL SERPL HS: 4 NG/L
CHLORIDE SERPL-SCNC: 104 MMOL/L (ref 96–108)
CO2 SERPL-SCNC: 28 MMOL/L (ref 21–32)
CREAT SERPL-MCNC: 1.02 MG/DL (ref 0.6–1.3)
DOP CALC LVOT PEAK VEL VTI: 18.84 CM
DOP CALC LVOT PEAK VEL: 0.85 M/S
E WAVE DECELERATION TIME: 255 MS
E/A RATIO: 1.2
ERYTHROCYTE [DISTWIDTH] IN BLOOD BY AUTOMATED COUNT: 12.3 % (ref 11.6–15.1)
FRACTIONAL SHORTENING: 38 (ref 28–44)
GFR SERPL CREATININE-BSD FRML MDRD: 86 ML/MIN/1.73SQ M
GLUCOSE SERPL-MCNC: 114 MG/DL (ref 65–140)
GLUCOSE SERPL-MCNC: 119 MG/DL (ref 65–140)
HCT VFR BLD AUTO: 44.3 % (ref 36.5–49.3)
HGB BLD-MCNC: 14.8 G/DL (ref 12–17)
INR PPP: 0.97 (ref 0.85–1.19)
INTERVENTRICULAR SEPTUM IN DIASTOLE (PARASTERNAL SHORT AXIS VIEW): 0.8 CM
INTERVENTRICULAR SEPTUM: 0.8 CM (ref 0.6–1.1)
LAAS-AP2: 18.4 CM2
LAAS-AP4: 19.5 CM2
LEFT ATRIUM SIZE: 3.7 CM
LEFT ATRIUM VOLUME (MOD BIPLANE): 59 ML
LEFT ATRIUM VOLUME INDEX (MOD BIPLANE): 27.1 ML/M2
LEFT INTERNAL DIMENSION IN SYSTOLE: 3.4 CM (ref 2.1–4)
LEFT VENTRICULAR INTERNAL DIMENSION IN DIASTOLE: 5.5 CM (ref 3.5–6)
LEFT VENTRICULAR POSTERIOR WALL IN END DIASTOLE: 0.8 CM
LEFT VENTRICULAR STROKE VOLUME: 101 ML
LVSV (TEICH): 101 ML
MAIN PULMONARY ARTERY: 2.4 CM
MCH RBC QN AUTO: 29 PG (ref 26.8–34.3)
MCHC RBC AUTO-ENTMCNC: 33.4 G/DL (ref 31.4–37.4)
MCV RBC AUTO: 87 FL (ref 82–98)
MV E'TISSUE VEL-SEP: 13 CM/S
MV PEAK A VEL: 0.45 M/S
MV PEAK E VEL: 54 CM/S
MV STENOSIS PRESSURE HALF TIME: 74 MS
MV VALVE AREA P 1/2 METHOD: 3
PLATELET # BLD AUTO: 184 THOUSANDS/UL (ref 149–390)
PMV BLD AUTO: 9.6 FL (ref 8.9–12.7)
POTASSIUM SERPL-SCNC: 4 MMOL/L (ref 3.5–5.3)
PROTHROMBIN TIME: 13.4 SECONDS (ref 12.3–15)
RBC # BLD AUTO: 5.11 MILLION/UL (ref 3.88–5.62)
RIGHT ATRIUM AREA SYSTOLE A4C: 14.7 CM2
RIGHT VENTRICLE ID DIMENSION: 2.2 CM
SL CV AV DECELERATION TIME RETROGRADE: 4114 MS
SL CV AV PEAK GRADIENT RETROGRADE: 21 MMHG
SL CV LEFT ATRIUM LENGTH A2C: 4.7 CM
SL CV LV EF: 60
SL CV PED ECHO LEFT VENTRICLE DIASTOLIC VOLUME (MOD BIPLANE) 2D: 148 ML
SL CV PED ECHO LEFT VENTRICLE SYSTOLIC VOLUME (MOD BIPLANE) 2D: 47 ML
SODIUM SERPL-SCNC: 138 MMOL/L (ref 135–147)
TRICUSPID ANNULAR PLANE SYSTOLIC EXCURSION: 2.4 CM
WBC # BLD AUTO: 4.89 THOUSAND/UL (ref 4.31–10.16)

## 2024-10-22 PROCEDURE — 85732 THROMBOPLASTIN TIME PARTIAL: CPT | Performed by: PSYCHIATRY & NEUROLOGY

## 2024-10-22 PROCEDURE — 86147 CARDIOLIPIN ANTIBODY EA IG: CPT | Performed by: PSYCHIATRY & NEUROLOGY

## 2024-10-22 PROCEDURE — 36415 COLL VENOUS BLD VENIPUNCTURE: CPT | Performed by: FAMILY MEDICINE

## 2024-10-22 PROCEDURE — 93306 TTE W/DOPPLER COMPLETE: CPT

## 2024-10-22 PROCEDURE — 82948 REAGENT STRIP/BLOOD GLUCOSE: CPT

## 2024-10-22 PROCEDURE — 85705 THROMBOPLASTIN INHIBITION: CPT | Performed by: PSYCHIATRY & NEUROLOGY

## 2024-10-22 PROCEDURE — 93005 ELECTROCARDIOGRAM TRACING: CPT

## 2024-10-22 PROCEDURE — 85305 CLOT INHIBIT PROT S TOTAL: CPT | Performed by: PSYCHIATRY & NEUROLOGY

## 2024-10-22 PROCEDURE — 99204 OFFICE O/P NEW MOD 45 MIN: CPT | Performed by: NURSE PRACTITIONER

## 2024-10-22 PROCEDURE — 99285 EMERGENCY DEPT VISIT HI MDM: CPT | Performed by: FAMILY MEDICINE

## 2024-10-22 PROCEDURE — 85300 ANTITHROMBIN III ACTIVITY: CPT | Performed by: PSYCHIATRY & NEUROLOGY

## 2024-10-22 PROCEDURE — 99285 EMERGENCY DEPT VISIT HI MDM: CPT

## 2024-10-22 PROCEDURE — 70551 MRI BRAIN STEM W/O DYE: CPT

## 2024-10-22 PROCEDURE — 93306 TTE W/DOPPLER COMPLETE: CPT | Performed by: INTERNAL MEDICINE

## 2024-10-22 PROCEDURE — 86146 BETA-2 GLYCOPROTEIN ANTIBODY: CPT | Performed by: PSYCHIATRY & NEUROLOGY

## 2024-10-22 PROCEDURE — 85027 COMPLETE CBC AUTOMATED: CPT | Performed by: FAMILY MEDICINE

## 2024-10-22 PROCEDURE — 85730 THROMBOPLASTIN TIME PARTIAL: CPT | Performed by: FAMILY MEDICINE

## 2024-10-22 PROCEDURE — 99223 1ST HOSP IP/OBS HIGH 75: CPT | Performed by: INTERNAL MEDICINE

## 2024-10-22 PROCEDURE — 85613 RUSSELL VIPER VENOM DILUTED: CPT | Performed by: PSYCHIATRY & NEUROLOGY

## 2024-10-22 PROCEDURE — 85670 THROMBIN TIME PLASMA: CPT | Performed by: PSYCHIATRY & NEUROLOGY

## 2024-10-22 PROCEDURE — 80048 BASIC METABOLIC PNL TOTAL CA: CPT | Performed by: FAMILY MEDICINE

## 2024-10-22 PROCEDURE — 70498 CT ANGIOGRAPHY NECK: CPT

## 2024-10-22 PROCEDURE — 99215 OFFICE O/P EST HI 40 MIN: CPT | Performed by: PSYCHIATRY & NEUROLOGY

## 2024-10-22 PROCEDURE — 85306 CLOT INHIBIT PROT S FREE: CPT | Performed by: PSYCHIATRY & NEUROLOGY

## 2024-10-22 PROCEDURE — 85303 CLOT INHIBIT PROT C ACTIVITY: CPT | Performed by: PSYCHIATRY & NEUROLOGY

## 2024-10-22 PROCEDURE — 70496 CT ANGIOGRAPHY HEAD: CPT

## 2024-10-22 PROCEDURE — 84484 ASSAY OF TROPONIN QUANT: CPT | Performed by: FAMILY MEDICINE

## 2024-10-22 PROCEDURE — 85610 PROTHROMBIN TIME: CPT | Performed by: FAMILY MEDICINE

## 2024-10-22 RX ORDER — ACETAMINOPHEN 325 MG/1
650 TABLET ORAL EVERY 4 HOURS PRN
Status: DISCONTINUED | OUTPATIENT
Start: 2024-10-22 | End: 2024-10-23 | Stop reason: HOSPADM

## 2024-10-22 RX ORDER — ASPIRIN 81 MG/1
81 TABLET, CHEWABLE ORAL DAILY
Status: DISCONTINUED | OUTPATIENT
Start: 2024-10-23 | End: 2024-10-23 | Stop reason: HOSPADM

## 2024-10-22 RX ORDER — CLOPIDOGREL BISULFATE 75 MG/1
300 TABLET ORAL ONCE
Status: COMPLETED | OUTPATIENT
Start: 2024-10-22 | End: 2024-10-22

## 2024-10-22 RX ORDER — CLOPIDOGREL BISULFATE 75 MG/1
75 TABLET ORAL DAILY
Status: DISCONTINUED | OUTPATIENT
Start: 2024-10-23 | End: 2024-10-23 | Stop reason: HOSPADM

## 2024-10-22 RX ORDER — ASPIRIN 325 MG
325 TABLET ORAL ONCE
Status: COMPLETED | OUTPATIENT
Start: 2024-10-22 | End: 2024-10-22

## 2024-10-22 RX ORDER — ONDANSETRON 2 MG/ML
4 INJECTION INTRAMUSCULAR; INTRAVENOUS EVERY 6 HOURS PRN
Status: DISCONTINUED | OUTPATIENT
Start: 2024-10-22 | End: 2024-10-23 | Stop reason: HOSPADM

## 2024-10-22 RX ORDER — ATORVASTATIN CALCIUM 40 MG/1
40 TABLET, FILM COATED ORAL EVERY EVENING
Status: DISCONTINUED | OUTPATIENT
Start: 2024-10-22 | End: 2024-10-23 | Stop reason: HOSPADM

## 2024-10-22 RX ADMIN — ASPIRIN 325 MG: 325 TABLET ORAL at 07:26

## 2024-10-22 RX ADMIN — CLOPIDOGREL 300 MG: 75 TABLET ORAL at 07:27

## 2024-10-22 RX ADMIN — IOHEXOL 85 ML: 350 INJECTION, SOLUTION INTRAVENOUS at 06:50

## 2024-10-22 NOTE — ED CARE HANDOFF
Emergency Department Sign Out Note        Sign out and transfer of care from Dr. Catalan. See Separate Emergency Department note.     The patient, Pedrito Gaines, was evaluated by the previous provider for R arm numbness/weakness, transient blurred visioin.    Workup Completed:  Stroke workup    ED Course / Workup Pending (followup):  Lab work and imaging within normal limits.  Per neurology, patient should be admitted for stroke pathway.  Patient was given loading dose of aspirin and Plavix.  Patient remained asymptomatic in the department.             Stroke Assessment       Row Name 10/22/24 0647             NIH Stroke Scale    Interval Baseline      Level of Consciousness (1a.) 0      LOC Questions (1b.) 0      LOC Commands (1c.) 0      Best Gaze (2.) 0      Visual (3.) 0      Facial Palsy (4.) 0      Motor Arm, Left (5a.) 0      Motor Arm, Right (5b.) 0      Motor Leg, Left (6a.) 0      Motor Leg, Right (6b.) 0      Limb Ataxia (7.) 0      Sensory (8.) 0      Best Language (9.) 0      Dysarthria (10.) 0      Extinction and Inattention (11.) (Formerly Neglect) 0      Total 0                                        ED Course as of 10/22/24 0724   Tue Oct 22, 2024   0657 50 yo M w/ hx of ocular migraine.  Felt numbness and intglign in R arm, was sleeping on it.  Phone fell out of hand, felt weak in that hand.  Symptoms since 6am.  Admits to transient blurred vision which he has had before.  NIH 0.  No TNK per neuro.  Dispo pending neuro      Procedures  Medical Decision Making  Amount and/or Complexity of Data Reviewed  Labs: ordered.  Radiology: ordered.    Risk  OTC drugs.  Prescription drug management.            Disposition  Final diagnoses:   Right arm numbness   Stroke-like symptoms     Time reflects when diagnosis was documented in both MDM as applicable and the Disposition within this note       Time User Action Codes Description Comment    10/22/2024  6:39 AM Ashvin Catalan Add [R20.0] Right arm numbness      10/22/2024  6:49 AM Ashvin Catalan Add [R29.90] Stroke-like symptoms           ED Disposition       None          Follow-up Information    None       Patient's Medications   Discharge Prescriptions    No medications on file     No discharge procedures on file.       ED Provider  Electronically Signed by     Patrick Rascon DO  10/22/24 0725

## 2024-10-22 NOTE — ASSESSMENT & PLAN NOTE
Mr. Gaines has presented to [facility] ED for evaluation of right upper extremity numbness and weakness in addition to right facial sensory dysfunction and weakness with blurry vision.  Stroke alert was initiated and patient required CT scan of the head and CT angiogram to be completed with no pathology noted and patient did not require thrombosis due to minimal and improving neurological deficit.    At this point consideration was TIA/CVA versus complex migraine.    Work up:  No CT evidence of large acute vascular territory infarct, intracranial hemorrhage or mass effect.     CTA H/N: No large vessel occlusion, proximal high-grade stenosis or aneurysm involving the [place] of [place].     -Within limitations of streak artifact no high-grade stenosis or dissection of the carotid or vertebral arteries.    Serologic GUIDRY: CBC.CMP normal, PT/INR nl    2DECho: EF 60% IVS: There appears to be a possible small, apical muscular ventricular septal defect with left to right shunting indicated by color flow Doppler.    MRI brain:  Small focus of acute/recent ischemia involving the left superior parietal lobe. No hemorrhage or large territory infarct seen    PLAN:    Admit to Stroke pathway:    ·  MRI brain with acute embolic stroke in the left superior lobe, likely embolic in origin;     · completed  Echo ( see above) and MODESTA -pending     · Recommend the following Labs    ? Lipid Panel    ? Hemoglobin A1c    ? TSH    ? Thrombosis panel    · Recommend loading aspirin 325 mg and Plavix 300 mg PO then continue on DAPT; AVOID triptan for migraine     · Recommend atorvastatin 40 mg daily    · Permissive HTN, allow for SBP up to 220 x 24 hours or Goal MAP> 100 from onset of stroke like symptoms, then goal normotension. Goal normotension sooner if MRI brain completed and does not reveal acute infarct.    · Euglycemic, normothermic goal    · Continue telemetry.    · PT/OT/ST    · Stroke education    · Frequent neuro checks.  Continue to monitor and notify neurology with any changes.    · STAT CT head for any acute change in neuro exam    Medical management and supportive care per primary team. Correction of any metabolic or infectious disturbances.    I discussed this case with the managing team from a remote location. I did not personally see or examine this patient. I have provided limited recommendations as above, but ultimate patient disposition as per managing team.

## 2024-10-22 NOTE — QUICK NOTE
Notified at 6:41 AM. Response immediate.     Pt is a 48 yo M PMHx ocular migraines who presents with acute stroke-like symptoms. LKW 6 AM. States that shortly after waking up, he experienced R face, RUE numbness and weakness, as well as blurry vision which he felt was similar to ocular migraines he has previously experienced. Of note, he did report that it appeared he was sleeping on his R side/RUE when he woke up. Upon arrival to the ED, NIHSS 0.     CTH showed no acute intracranial abnormalities. CTA was negative for LVO. Pt was not a candidate for TNK as his symptoms resolved. Ddx includes stroke, TIA, complex migraines given his history, as well as a possible peripheral cause given his positioning upon waking. Recommend admission stroke pathway for stroke/TIA workup and ASA/plavix load.

## 2024-10-22 NOTE — CONSULTS
Consultation - Neurology   Name: Pedrito Gaines 49 y.o. male I MRN: [id number]  Unit/Bed#: ED 02 I Date of Admission: 10/22/2024   Date of Service: 10/22/2024 I Hospital Day: 0   Consult to Neurology  Consult performed by: Delia Hernandez MD  Consult ordered by: Ashvin Catalan MD        Physician Requesting Evaluation: Rudi Aj DO   Reason for Evaluation / Principal Problem: CVA    Assessment & Plan  Stroke-like symptoms  Mr. Gaines has presented to [facility] ED for evaluation of right upper extremity numbness and weakness in addition to right facial sensory dysfunction and weakness with blurry vision.  Stroke alert was initiated and patient required CT scan of the head and CT angiogram to be completed with no pathology noted and patient did not require thrombosis due to minimal and improving neurological deficit.    At this point consideration was TIA/CVA versus complex migraine.    Work up:  No CT evidence of large acute vascular territory infarct, intracranial hemorrhage or mass effect.     CTA H/N: No large vessel occlusion, proximal high-grade stenosis or aneurysm involving the [place] of [place].     -Within limitations of streak artifact no high-grade stenosis or dissection of the carotid or vertebral arteries.    Serologic GUIDRY: CBC.CMP normal, PT/INR nl    2DECho: EF 60% IVS: There appears to be a possible small, apical muscular ventricular septal defect with left to right shunting indicated by color flow Doppler.    MRI brain:  Small focus of acute/recent ischemia involving the left superior parietal lobe. No hemorrhage or large territory infarct seen    PLAN:    Admit to Stroke pathway:    ·  MRI brain with acute embolic stroke in the left superior lobe, likely embolic in origin;     · completed  Echo ( see above) and MODESTA -pending     · Recommend the following Labs    ? Lipid Panel    ? Hemoglobin A1c    ? TSH    ? Thrombosis panel    · Recommend loading aspirin 325 mg and Plavix 300 mg PO  then continue on DAPT; AVOID triptan for migraine     · Recommend atorvastatin 40 mg daily    · Permissive HTN, allow for SBP up to 220 x 24 hours or Goal MAP> 100 from onset of stroke like symptoms, then goal normotension. Goal normotension sooner if MRI brain completed and does not reveal acute infarct.    · Euglycemic, normothermic goal    · Continue telemetry.    · PT/OT/ST    · Stroke education    · Frequent neuro checks. Continue to monitor and notify neurology with any changes.    · STAT CT head for any acute change in neuro exam    Medical management and supportive care per primary team. Correction of any metabolic or infectious disturbances.    I discussed this case with the managing team from a remote location. I did not personally see or examine this patient. I have provided limited recommendations as above, but ultimate patient disposition as per managing team.  Ocular migraine  -Patient described infrequent migraine headaches and he is to avoid triptan in the light of developing TIA/CVA this morning.      Thrombolytic Decision: Patient not a candidate. Symptoms resolved/clearly non disabling.    Pedrito Gaines will need follow up in in 6 weeks with headache attending or advance practitioner.   History of Present Illness   Hx and PE limited by: Telemedicine exam  Patient last known well: 6 am  Stroke alert called: 6:46 am  Neurology time of arrival: 6:47 am  HPI: Pedrito Gaines is a 49 y.o. right handed male who presents with strokelike symptoms.  This morning, patient woke up around 6 in the morning and shortly thereafter he started experiencing right side of the face, right upper extremity numbness and weakness in addition to blurry vision.  Patient has known history of ocular migraine and he believes symptoms were similar to his previous experience.  Upon arrival to emergency department, stroke alert was called.   Initial NIHSS was 0.    Patient completed stroke alert workup including CT scan of the head  and CT angiogram with no acute pathology appreciated in brain parenchyma with no large vessel occlusion.     Due to complete resolution of the symptoms, patient did not require thrombolysis.     Patient was advised to admit under stroke path concerning differential diagnosis still brought concern for TIA/complex migraine/CVA.    2dEcho : Left Ventricle: Left ventricular cavity size is normal. Wall thickness is normal. The left ventricular ejection fraction is 60%. Systolic function is normal. Wall motion is normal. Diastolic function is normal.    IVS: There appears to be a possible small, apical muscular ventricular septal defect with left to right shunting indicated by color flow Doppler.    Left Atrium: The atrium is mildly dilated.    Atrial Septum: There is a small and patent foramen ovale confirmed at rest with predominant right to left shunting using saline contrast.    Aortic Valve: There is mild regurgitation.    Mitral Valve: There is mild regurgitation.    Tricuspid Valve: There is mild regurgitation.    Patient was loaded with aspirin and Plavix while in the hospital.  Review of Systems  I have reviewed the patient's PMH, PSH, Social History, Family History, Meds, and Allergies    Objective :  Temp:  [97.6 °F (36.4 °C)] 97.6 °F (36.4 °C)  HR:  [58-74] 65  BP: (120-155)/() 120/67  Resp:  [17-20] 20  SpO2:  [94 %-97 %] 97 %  O2 Device: None (Room air)    Physical ExamNeurological Exam  CONSTITUTIONAL: NAD, pleasant. NECK: supple, no lymphadenopathy, PSYCH: appropriate mood and affect  NEUROLOGIC COMPREHENSIVE EXAM: Patient is oriented to person, place and time, NAD; appropriate affect. CN II, III, IV, V, VI, VII,VIII,IX,X,XI-XII intact with EOMI,  VF grossly intact,  symmetric face noted. Motor: Grossly intact UE/LE bilateral symmetric; Coordination within normal limits on FTN and SERGEY testing; Tandem gait is intact. Romberg: absent.  NIHSS:  1a.Level of Consciousness: 0 = Alert   1b. LOC Questions: 0  "= Answers both correctly   1c. LOC Commands: 0 = Obeys both correctly   2. Best Gaze: 0 = Normal   3. Visual: 0 = No visual field loss   4. Facial Palsy: 0=Normal symmetric movement   5a. Motor Right Arm: 0=No drift, limb holds 90 (or 45) degrees for full 10 seconds   5b. Motor Left Arm: 0=No drift, limb holds 90 (or 45) degrees for full 10 seconds   6a. Motor Right Le=No drift, limb holds 90 (or 45) degrees for full 10 seconds   6b. Motor Left Le=No drift, limb holds 90 (or 45) degrees for full 10 seconds   7. Limb Ataxia:  0=Absent   8. Sensory: 0=Normal; no sensory loss   9. Best Language:  0=No aphasia, normal   10. Dysarthria: 0=Normal articulation   11. Extinction and Inattention (formerly Neglect): 0=No abnormality   Total Score: 0     Time NIHSS was completed: after the stroke alert    Modified Butler Score:  0 (No baseline symptoms/disability)      Lab Results: I have reviewed the following results:Creatinine Clearance: Estimated Creatinine Clearance: 104.5 mL/min (by C-G formula based on SCr of 1.02 mg/dL)., LFTs: No new results in last 24 hours. , PTT/INR:  .     10/22/24  0640   PTT 28   INR 0.97    , Troponin,BNP:  .     10/22/24  0640 10/22/24  0904   HSTNI0 4  --    HSTNI2  --  4    , Lactic Acid: No new results in last 24 hours. , Procalcitonin: No results found for: \"PROCALCITONI\", Lipase: No results found for: \"LIPASE\", Vitamins B1, B6, B12, A, and D: No results found for: \"B1\", \"THYROGLB\", \"PYNNTVFT21\", \"[id number]\", Iron: No results found for: \"IRON\", Lipid Profile:   , TSH:   , Urinalysis: No results found for: \"COLORU\", \"CLARITYU\", \"SPECGRAV\", \"PHUR\", \"LEUKOCYTESUR\", \"NITRITE\", \"PROTEINUA\", \"GLUCOSEU\", \"KETONESU\", \"BILIRUBINUR\", \"BLOODU\"    Imaging Results Review: I personally reviewed the following image studies/reports in PACS and discussed pertinent findings with Radiology: CT head and MRI brain. My interpretation of the radiology images/reports is: Acute ischemic stroke " noted.      VTE Prophylaxis: Enoxaparin (Lovenox)    Code Status: Level 1 - Full Code  Advance Directive and Living Will:      Power of :    POLST:      Administrative Statements   I have spent a total time of 40 minutes in caring for this patient on the day of the visit/encounter including Diagnostic results, Prognosis, Risks and benefits of tx options, Instructions for management, Risk factor reductions, and Impressions.  VIRTUAL CARE DOCUMENTATION:     1. This service was provided via Telemedicine using [employer] Kit     2. Parties in the room with patient during teleconsult Patient only    3. Confidentiality My office door was closed     4. Participants No one else was in the room    5. Patient acknowledged consent and understanding of privacy and security of the  Telemedicine consult. I informed the patient that I have reviewed their record in Epic and presented the opportunity for them to ask any questions regarding the visit today.  The patient agreed to participate.    6. I have spent a total time of 40 minutes in caring for this patient on the day of the visit/encounter including Impressions, Counseling / Coordination of care, Documenting in the medical record, Reviewing / ordering tests, medicine, procedures  , Obtaining or reviewing history  , and Communicating with other healthcare professionals .

## 2024-10-22 NOTE — H&P
H&P - Hospitalist   Name: Pedrito Gaines 49 y.o. male I MRN: [id number]  Unit/Bed#: ED 02 I Date of Admission: 10/22/2024   Date of Service: 10/22/2024 I Hospital Day: 0     Assessment & Plan  Stroke-like symptoms  Patient with right upper extremity numbness, stroke alert initiated in the ER  CTA head/neck negative for any large vessel obstruction  Admitted under stroke pathway  Continue neurochecks per protocol  MRI brain  Aspirin/statin  PT/OT/speech eval  Echocardiogram  Neurology consult  Monitor on telemetry  Ocular migraine  Pain control as needed  Pulmonary nodule  7 mm right upper lobe pulmonary nodule  Patient does have a history of tobacco use in the past  Reviewed finding with patient.  He demonstrates understanding of finding and need for follow-up  Repeat CT chest in 3 months      VTE Pharmacologic Prophylaxis:   Low Risk (Score 0-2) - Encourage Ambulation.  Code Status: Level 1 - Full Code   Discussion with family: Updated  (wife) via phone.    Anticipated Length of Stay: Patient will be admitted on an observation basis with an anticipated length of stay of less than 2 midnights secondary to stroke symptoms.    History of Present Illness   Chief Complaint: Right upper extremity numbness    Pedrito Gaines is a 49 y.o. male with a PMH of ocular migraine who presents with right upper extremity numbness.  Patient states that he woke up this morning with symptoms of right upper extremity numbness.  Also had ocular pain and right eye with some numbness of the right side of the face as well.  Patient presented to the ER, drove himself.  Stroke alert initiated.  No obvious large vessel obstruction on imaging.  Neurology recommending stroke workup.  Patient's symptoms completely resolved around 6:30 this morning    Review of Systems   Neurological:  Positive for numbness and headaches.   All other systems reviewed and are negative.      Historical Information   Past Medical History:   Diagnosis Date     Environmental allergies     Migraines      Past Surgical History:   Procedure Laterality Date    CLOSED MANIPULATION SHOULDER Left     HAND SURGERY Right     fracture with screw placement    OR REPAIR NASAL VESTIBULAR STENOSIS N/A 5/21/2020    Procedure: REPAIR VESTIBULAR STENOSIS;  Surgeon: Miguel Watkins MD;  [facility];  Service: ENT    OR SEPTOPLASTY/SUBMUCOUS RESECJ W/WO CARTILAGE GRF N/A 5/21/2020    Procedure: SEPTOPLASTY;  Surgeon: Miguel Watkins MD;  [facility];  Service: ENT    OR SUBMUCOUS RESCJ INFERIOR TURBINATE PRTL/COMPL N/A 5/21/2020    Procedure: SUBMUCOSAL RESECTION OF TURBINATES WITH CADAVERIC RIB GRAFT;  Surgeon: Miguel Watkins MD;  [facility];  Service: ENT     Social History     Tobacco Use    Smoking status: Former    Smokeless tobacco: Former     Types: Chew     Quit date: 2016    Tobacco comments:     quit 2016   Vaping Use    Vaping status: Never Used   Substance and Sexual Activity    Alcohol use: Yes     Comment: occasional    Drug use: Never    Sexual activity: Not on file     E-Cigarette/Vaping    E-Cigarette Use Never User      E-Cigarette/Vaping Substances    Nicotine No     THC No     CBD No      Family History   Problem Relation Age of Onset    Arthritis Mother     Diabetes Mother      Social History:  Marital Status: /Civil Union   Patient Pre-hospital Living Situation: Home  Patient Pre-hospital Level of Mobility: walks  Patient Pre-hospital Diet Restrictions: none    Meds/Allergies   I have reviewed home medications with patient personally.  Prior to Admission medications    Medication Sig Start Date End Date Taking? Authorizing Provider   SUMAtriptan (Imitrex) 50 mg tablet Take 1 tablet (50 mg total) by mouth once as needed for migraine for up to 1 dose 7/12/23   Devin Zheng MD   TiZANidine (ZANAFLEX) 6 MG capsule Take 1 capsule (6 mg total) by mouth daily at bedtime 12/3/21   Devin Zheng MD     Allergies   Allergen Reactions     "Hydrocodone Itching       Objective :  Temp:  [97.6 °F (36.4 °C)] 97.6 °F (36.4 °C)  HR:  [58-74] 61  BP: (120-155)/() 120/67  Resp:  [17-20] 20  SpO2:  [94 %-97 %] 97 %  O2 Device: None (Room air)    Physical Exam  Constitutional:       General: He is not in acute distress.  HENT:      Head: Normocephalic and atraumatic.      Nose: Nose normal.      Mouth/Throat:      Mouth: Mucous membranes are moist.   Eyes:      Extraocular Movements: Extraocular movements intact.      Conjunctiva/sclera: Conjunctivae normal.   Cardiovascular:      Rate and Rhythm: Normal rate and regular rhythm.   Pulmonary:      Effort: Pulmonary effort is normal. No respiratory distress.   Abdominal:      Palpations: Abdomen is soft.      Tenderness: There is no abdominal tenderness.   Musculoskeletal:         General: Normal range of motion.      Cervical back: Normal range of motion and neck supple.   Skin:     General: Skin is warm and dry.   Neurological:      General: No focal deficit present.      Mental Status: He is alert. Mental status is at baseline.      Cranial Nerves: No cranial nerve deficit.      Motor: No weakness.      Coordination: Coordination normal.   Psychiatric:         Mood and Affect: Mood normal.         Behavior: Behavior normal.          Lines/Drains:      Lab Results: I have reviewed the following results:  Results from last 7 days   Lab Units 10/22/24  0640   WBC Thousand/uL 4.89   HEMOGLOBIN g/dL 14.8   HEMATOCRIT % 44.3   PLATELETS Thousands/uL 184     Results from last 7 days   Lab Units 10/22/24  0640   SODIUM mmol/L 138   POTASSIUM mmol/L 4.0   CHLORIDE mmol/L 104   CO2 mmol/L 28   BUN mg/dL 16   CREATININE mg/dL 1.02   ANION GAP mmol/L 6   CALCIUM mg/dL 9.6   GLUCOSE RANDOM mg/dL 119     Results from last 7 days   Lab Units 10/22/24  0640   INR  0.97     Results from last 7 days   Lab Units 10/22/24  0635   POC GLUCOSE mg/dl 114     No results found for: \"HGBA1C\"        Imaging Results Review: I " reviewed radiology reports from this admission including: CT head.  Other Study Results Review: No additional pertinent studies reviewed.    Administrative Statements       ** Please Note: This note has been constructed using a voice recognition system. **

## 2024-10-22 NOTE — CONSULTS
Consultation - Cardiology   Name: Pedrito Gaines 49 y.o. male I MRN: [id number]  Unit/Bed#: -01 I Date of Admission: 10/22/2024   Date of Service: 10/22/2024 I Hospital Day: 0   Inpatient consult to Cardiology  Consult performed by: LUIS Fisher  Consult ordered by: Milka Hutchison MD        Physician Requesting Evaluation: Rudi Aj DO   Reason for Evaluation / Principal Problem: VSD      Assessment & Plan  Stroke-like symptoms  Primary reason for presentation  Management per neurology and slim  Ocular migraine  Patient with history of ocular migraines  VSD (ventricular septal defect)  Noted on echocardiogram done for stroke workup      Other summary comments:   Pedrito was found to have a small VSD on echocardiogram performed in the setting of strokelike symptoms.    He is asymptomatic, no evidence of volume overload.    I suspect this was an incidental finding.  Recommend periodic echo surveillance which will be arranged through our office.    Cardiology will remain available if needed.    Outpatient Cardiologist: none    HPI: Pedrito Gaines is a 49 y.o. year old male who presented with   intermittent episodes of numbness and tingling on the R side of his face and RUE.  He had several episodes of RUE weakness which resulted in him dropping his phone and coffee.  He had blurry vision in his R eye.  This has happened in the past as result of ocular migraines.    For these reasons, patient presented to the emergency department for evaluation.  Given his presenting symptoms and concern for stroke, he was evaluated by neurology.  CTA was negative but admission was recommended for further evaluation.    As part of the stroke workup, an echocardiogram was performed which showed a small VSD.  For this reason cardiology was consulted.    Pedrito denies prior cardiac history.  There is no known family history of cardiac issues.    Pedrito is very active.  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    He denies any cardiac symptoms.      EKG: sinus rhythm with sinus arrhythmia      MOST  RECENT CARDIAC IMAGING:   Echo 10/22/2024  EF 60%  Possible small, apical muscular VSD with L to R shunting  Small PFO   Mild AI, MR, TR      Review of Systems: a 10 point review of systems was conducted and is negative except for as mentioned in the HPI or as below.        Historical Information   Past Medical History:   Diagnosis Date    Environmental allergies     Migraines      Past Surgical History:   Procedure Laterality Date    CLOSED MANIPULATION SHOULDER Left     HAND SURGERY Right     fracture with screw placement    DE REPAIR NASAL VESTIBULAR STENOSIS N/A 5/21/2020    Procedure: REPAIR VESTIBULAR STENOSIS;  Surgeon: Miguel Watkins MD;  [facility];  Service: ENT    DE SEPTOPLASTY/SUBMUCOUS RESECJ W/WO CARTILAGE GRF N/A 5/21/2020    Procedure: SEPTOPLASTY;  Surgeon: Miguel Watkins MD;  [facility];  Service: ENT    DE SUBMUCOUS RESCJ INFERIOR TURBINATE PRTL/COMPL N/A 5/21/2020    Procedure: SUBMUCOSAL RESECTION OF TURBINATES WITH CADAVERIC RIB GRAFT;  Surgeon: Miguel Watkins MD;  [facility];  Service: ENT     Social History     Substance and Sexual Activity   Alcohol Use Yes    Comment: occasional     Social History     Substance and Sexual Activity   Drug Use Never     Social History     Tobacco Use   Smoking Status Former   Smokeless Tobacco Former    Types: Chew    Quit date: 2016   Tobacco Comments    quit 2016       Family History: Negative for cardiac issues      Meds/Allergies   all current active meds have been reviewed    Medications Prior to Admission:     SUMAtriptan (Imitrex) 50 mg tablet    TiZANidine (ZANAFLEX) 6 MG capsule    Allergies   Allergen Reactions    Hydrocodone Itching       Objective   Vitals: Blood pressure 117/73, pulse 81, temperature 97.6 °F (36.4 °C), temperature source Tympanic, resp. rate 20, height 5' 8\" (1.727 m), weight " 105 kg (232 lb 9.4 oz), SpO2 97%., Body mass index is 35.36 kg/m².,   Orthostatic Blood Pressures      Flowsheet Row Most Recent Value   Blood Pressure 117/73 filed at 10/22/2024 1124   Patient Position - Orthostatic VS Sitting filed at 10/22/2024 1124            Systolic (24hrs), Av , Min:117 , Max:155     Diastolic (24hrs), Av, Min:67, Max:108      Physical Exam:    General:  Normal appearance in no distress.  Eyes:  Anicteric.  Oral mucosa:  Moist.  Neck:  No JVD. Carotid upstrokes are brisk without bruits.  No masses.  Chest:  Clear to auscultation.  Cardiac:  No palpable PMI.  Normal S1 and S2.  No murmur gallop or rub.  Abdomen:  Soft and nontender. No palpable organomegaly or aortic enlargement.  Extremities:  No peripheral edema.  Musculoskeletal:  Symmetric.   Vascular:  Pedal pulses are intact.  Neuro:  Grossly symmetric.  Psych:  Alert and oriented x3.        Lab Results:   Labs reviewed and prominent abnormalities reviewed above and/or below.    Troponins:    Results from last 7 days   Lab Units 10/22/24  0904 10/22/24  0640   HS TNI 0HR ng/L  --  4   HS TNI 2HR ng/L 4  --    HSTNI D2 ng/L 0  --      BNP:

## 2024-10-22 NOTE — ED PROVIDER NOTES
Time reflects when diagnosis was documented in both MDM as applicable and the Disposition within this note       Time User Action Codes Description Comment    10/22/2024  6:39 AM Ashvin Catalan Add [R20.0] Right arm numbness     10/22/2024  6:49 AM Ashvin Catalan Add [R29.90] Stroke-like symptoms           ED Disposition       None          Assessment & Plan       Medical Decision Making  Amount and/or Complexity of Data Reviewed  Labs: ordered.  Radiology: ordered.    41-year-old male presented to ED with complaint of intermittent episode of numbness and tingling starting on his right upper arm right-sided face and intermittent weakness that started around 6:00 this morning.  He states that he was sleeping had his right arm under him alarm rating turn over to turn the alarm off when felt numbness in his right arm.  He states he got up walked around numbness resolved then came back again.  States he was holding his phone felt weak dropped the phone on the floor had a similar episode with his coffee.  Patient states he also had an episode of blurry vision in his right eye.  He states that he normally gets ocular migraine which it normally starts with the blurry vision and then headache started however denies any headache at this time.  Patient NIH stroke scale is 0  History is taken from patient  Differential diagnoses include TIA/CVA/complex migraine  Plan will obtain labs CT CTA head  Case discussed with neurology on-call Dr.Kaitlin Goldberg states no TNK at this time since patient is asymptomatic will wait for CTA.  Patient care transferred to           Medications   iohexol (OMNIPAQUE) 350 MG/ML injection (MULTI-DOSE) 100 mL (85 mL Intravenous Given 10/22/24 06)       ED Risk Strat Scores       Stroke Assessment       Row Name 10/22/24 0680             NIH Stroke Scale    Interval Baseline      Level of Consciousness (1a.) 0      LOC Questions (1b.) 0      LOC Commands (1c.) 0      Best Gaze (2.) 0       Visual (3.) 0      Facial Palsy (4.) 0      Motor Arm, Left (5a.) 0      Motor Arm, Right (5b.) 0      Motor Leg, Left (6a.) 0      Motor Leg, Right (6b.) 0      Limb Ataxia (7.) 0      Sensory (8.) 0      Best Language (9.) 0      Dysarthria (10.) 0      Extinction and Inattention (11.) (Formerly Neglect) 0      Total 0                                                          History of Present Illness       Chief Complaint   Patient presents with    CVA/TIA-like Symptoms     Right side upper body numbness with blurred vision to the right eye at 0600hrs        Past Medical History:   Diagnosis Date    Environmental allergies     Migraines       Past Surgical History:   Procedure Laterality Date    CLOSED MANIPULATION SHOULDER Left     HAND SURGERY Right     fracture with screw placement    MI REPAIR NASAL VESTIBULAR STENOSIS N/A 5/21/2020    Procedure: REPAIR VESTIBULAR STENOSIS;  Surgeon: Miguel Watkins MD;  [facility];  Service: ENT    MI SEPTOPLASTY/SUBMUCOUS RESECJ W/WO CARTILAGE GRF N/A 5/21/2020    Procedure: SEPTOPLASTY;  Surgeon: Miguel Watkins MD;  [facility];  Service: ENT    MI SUBMUCOUS RESCJ INFERIOR TURBINATE PRTL/COMPL N/A 5/21/2020    Procedure: SUBMUCOSAL RESECTION OF TURBINATES WITH CADAVERIC RIB GRAFT;  Surgeon: Miguel Watkins MD;  [facility];  Service: ENT      Family History   Problem Relation Age of Onset    Arthritis Mother     Diabetes Mother       Social History     Tobacco Use    Smoking status: Former    Smokeless tobacco: Former     Types: Chew     Quit date: 2016    Tobacco comments:     quit 2016   Vaping Use    Vaping status: Never Used   Substance Use Topics    Alcohol use: Yes     Comment: occasional    Drug use: Never      E-Cigarette/Vaping    E-Cigarette Use Never User       E-Cigarette/Vaping Substances    Nicotine No     THC No     CBD No       I have reviewed and agree with the history as documented.       CVA/TIA-like Symptoms  Presenting  symptoms: no confusion and no headaches    Associated symptoms: no chest pain, no dizziness, no fever, no nausea and no vomiting        Review of Systems   Constitutional:  Negative for chills and fever.   HENT:  Negative for rhinorrhea and sore throat.    Eyes:  Negative for visual disturbance.   Respiratory:  Negative for cough and shortness of breath.    Cardiovascular:  Negative for chest pain and leg swelling.   Gastrointestinal:  Negative for abdominal pain, diarrhea, nausea and vomiting.   Genitourinary:  Negative for dysuria.   Musculoskeletal:  Negative for back pain and myalgias.   Skin:  Negative for rash.   Neurological:  Positive for numbness. Negative for dizziness and headaches.   Psychiatric/Behavioral:  Negative for confusion.    All other systems reviewed and are negative.          Objective       ED Triage Vitals [10/22/24 0640]   Temperature Pulse Blood Pressure Respirations SpO2 Patient Position - Orthostatic VS   97.6 °F (36.4 °C) 73 143/82 18 97 % --      Temp Source Heart Rate Source BP Location FiO2 (%) Pain Score    Tympanic Monitor Left arm -- No Pain      Vitals      Date and Time Temp Pulse SpO2 Resp BP Pain Score FACES Pain Rating User   10/22/24 0648 -- -- -- -- -- No Pain -- KB   10/22/24 0640 97.6 °F (36.4 °C) 73 97 % 18 143/82 No Pain -- MD            Physical Exam  Vitals and nursing note reviewed.   Constitutional:       General: He is not in acute distress.     Appearance: He is well-developed. He is not diaphoretic.   HENT:      Head: Normocephalic and atraumatic.      Right Ear: External ear normal.      Left Ear: External ear normal.      Nose: Nose normal.      Mouth/Throat:      Mouth: Mucous membranes are moist.      Pharynx: Oropharynx is clear. No posterior oropharyngeal erythema.   Eyes:      Conjunctiva/sclera: Conjunctivae normal.      Pupils: Pupils are equal, round, and reactive to light.   Cardiovascular:      Rate and Rhythm: Normal rate and regular rhythm.       Pulses: Normal pulses.      Heart sounds: Normal heart sounds.   Pulmonary:      Effort: Pulmonary effort is normal. No respiratory distress.      Breath sounds: Normal breath sounds. No wheezing.   Abdominal:      General: Bowel sounds are normal. There is no distension.      Palpations: Abdomen is soft.      Tenderness: There is no abdominal tenderness.   Musculoskeletal:         General: Normal range of motion.      Cervical back: Normal range of motion and neck supple.   Lymphadenopathy:      Cervical: No cervical adenopathy.   Skin:     General: Skin is warm and dry.      Capillary Refill: Capillary refill takes less than 2 seconds.   Neurological:      Mental Status: He is alert and oriented to person, place, and time.   Psychiatric:         Mood and Affect: Mood normal.         Behavior: Behavior normal.         Results Reviewed       Procedure Component Value Units Date/Time    CBC and Platelet [[id number]]  (Normal) Collected: 10/22/24 0640    Lab Status: Final result Specimen: Blood from Arm, Right Updated: 10/22/24 0645     WBC 4.89 Thousand/uL      RBC 5.11 Million/uL      Hemoglobin 14.8 g/dL      Hematocrit 44.3 %      MCV 87 fL      MCH 29.0 pg      MCHC 33.4 g/dL      RDW 12.3 %      Platelets 184 Thousands/uL      MPV 9.6 fL     Protime-INR [[id number]] Collected: 10/22/24 0640    Lab Status: In process Specimen: Blood from Arm, Right Updated: 10/22/24 0643    APTT [[id number]] Collected: 10/22/24 0640    Lab Status: In process Specimen: Blood from Arm, Right Updated: 10/22/24 0643    Basic metabolic panel [[id number]] Collected: 10/22/24 0640    Lab Status: In process Specimen: Blood from Arm, Right Updated: 10/22/24 0643    HS Troponin 0hr (reflex protocol) [[id number]] Collected: 10/22/24 0640    Lab Status: In process Specimen: Blood from Arm, Right Updated: 10/22/24 0643    Fingerstick Glucose (POCT) [[id number]]  (Normal) Collected: 10/22/24 0635    Lab Status: Final result Specimen: Blood Updated:  10/22/24 0636     POC Glucose 114 mg/dl             CT stroke alert brain    (Results Pending)   CTA stroke alert (head/neck)    (Results Pending)       Procedures    ED Medication and Procedure Management   Prior to Admission Medications   Prescriptions Last Dose Informant Patient Reported? Taking?   SUMAtriptan (Imitrex) 50 mg tablet   No No   Sig: Take 1 tablet (50 mg total) by mouth once as needed for migraine for up to 1 dose   TiZANidine (ZANAFLEX) 6 MG capsule   No No   Sig: Take 1 capsule (6 mg total) by mouth daily at bedtime      Facility-Administered Medications: None     Patient's Medications   Discharge Prescriptions    No medications on file     No discharge procedures on file.  ED SEPSIS DOCUMENTATION   Time reflects when diagnosis was documented in both MDM as applicable and the Disposition within this note       Time User Action Codes Description Comment    10/22/2024  6:39 AM Ashvin Catalan Add [R20.0] Right arm numbness     10/22/2024  6:49 AM Ashvin Catalan Add [R29.90] Stroke-like symptoms                  Ashvin Catalan MD  10/22/24 0651

## 2024-10-22 NOTE — PLAN OF CARE
Problem: PAIN - ADULT  Goal: Verbalizes/displays adequate comfort level or baseline comfort level  Description: Interventions:  - Encourage patient to monitor pain and request assistance  - Assess pain using appropriate pain scale  - Administer analgesics based on type and severity of pain and evaluate response  - Implement non-pharmacological measures as appropriate and evaluate response  - Consider cultural and social influences on pain and pain management  - Notify physician/advanced practitioner if interventions unsuccessful or patient reports new pain  Outcome: Progressing     Problem: INFECTION - ADULT  Goal: Absence or prevention of progression during hospitalization  Description: INTERVENTIONS:  - Assess and monitor for signs and symptoms of infection  - Monitor lab/diagnostic results  - Monitor all insertion sites, i.e. indwelling lines, tubes, and drains  - Monitor endotracheal if appropriate and nasal secretions for changes in amount and color  - [place] appropriate cooling/warming therapies per order  - Administer medications as ordered  - Instruct and encourage patient and family to use good hand hygiene technique  - Identify and instruct in appropriate isolation precautions for identified infection/condition  Outcome: Progressing  Goal: Absence of fever/infection during neutropenic period  Description: INTERVENTIONS:  - Monitor WBC    Outcome: Progressing     Problem: SAFETY ADULT  Goal: Patient will remain free of falls  Description: INTERVENTIONS:  - Educate patient/family on patient safety including physical limitations  - Instruct patient to call for assistance with activity   - Consult OT/PT to assist with strengthening/mobility   - Keep Call bell within reach  - Keep bed low and locked with side rails adjusted as appropriate  - Keep care items and personal belongings within reach  - Initiate and maintain comfort rounds  - Make Fall Risk Sign visible to staff  - Offer Toileting every 2 Hours,  in advance of need  - Initiate/Maintain bed alarm  - Obtain necessary fall risk management equipment: non skid socks  - Apply yellow socks and bracelet for high fall risk patients  - Consider moving patient to room near nurses station  Outcome: Progressing  Goal: Maintain or return to baseline ADL function  Description: INTERVENTIONS:  -  Assess patient's ability to carry out ADLs; assess patient's baseline for ADL function and identify physical deficits which impact ability to perform ADLs (bathing, care of mouth/teeth, toileting, grooming, dressing, etc.)  - Assess/evaluate cause of self-care deficits   - Assess range of motion  - Assess patient's mobility; develop plan if impaired  - Assess patient's need for assistive devices and provide as appropriate  - Encourage maximum independence but intervene and supervise when necessary  - Involve family in performance of ADLs  - Assess for home care needs following discharge   - Consider OT consult to assist with ADL evaluation and planning for discharge  - Provide patient education as appropriate  Outcome: Progressing  Goal: Maintains/Returns to pre admission functional level  Description: INTERVENTIONS:  - Perform AM-PAC 6 Click Basic Mobility/ Daily Activity assessment daily.  - Set and communicate daily mobility goal to care team and patient/family/caregiver.   - Collaborate with rehabilitation services on mobility goals if consulted  - Perform Range of Motion 2 times a day.  - Reposition patient every 2 hours.  - Dangle patient 2 times a day  - Stand patient 2 times a day  - Ambulate patient 2 times a day  - Out of bed to chair 3 times a day   - Out of bed for meals 3 times a day  - Out of bed for toileting  - Record patient progress and toleration of activity level   Outcome: Progressing     Problem: DISCHARGE PLANNING  Goal: Discharge to home or other facility with appropriate resources  Description: INTERVENTIONS:  - Identify barriers to discharge w/patient and  caregiver  - Arrange for needed discharge resources and transportation as appropriate  - Identify discharge learning needs (meds, wound care, etc.)  - Refer to Case Management Department for coordinating discharge planning if the patient needs post-hospital services based on physician/advanced practitioner order or complex needs related to functional status, cognitive ability, or social support system  Outcome: Progressing     Problem: Knowledge Deficit  Goal: Patient/family/caregiver demonstrates understanding of disease process, treatment plan, medications, and discharge instructions  Description: Complete learning assessment and assess knowledge base.  Interventions:  - Provide teaching at level of understanding  - Provide teaching via preferred learning methods  Outcome: Progressing     Problem: Neurological Deficit  Goal: Neurological status is stable or improving  Description: Interventions:  - Monitor and assess patient's level of consciousness, motor function, sensory function, and level of assistance needed for ADLs.   - Monitor and report changes from baseline. Collaborate with interdisciplinary team to initiate plan and implement interventions as ordered.   - Provide and maintain a safe environment.  - Consider seizure precautions.  - Consider fall precautions.  - Consider aspiration precautions.  - Consider bleeding precautions.  Outcome: Progressing     Problem: Activity Intolerance/Impaired Mobility  Goal: Mobility/activity is maintained at optimum level for patient  Description: Interventions:  - Assess and monitor patient  barriers to mobility and need for assistive/adaptive devices.  - Assess patient's emotional response to limitations.  - Collaborate with interdisciplinary team and initiate plans and interventions as ordered.  - Encourage independent activity per ability.  - Maintain proper body alignment.  - Perform active/passive rom as tolerated/ordered.  - Plan activities to conserve energy.  -  Turn patient as appropriate  Outcome: Progressing     Problem: Communication Impairment  Goal: Ability to express needs and understand communication  Description: Assess patient's communication skills and ability to understand information.  Patient will demonstrate use of effective communication techniques, alternative methods of communication and understanding even if not able to speak.     - Encourage communication and provide alternate methods of communication as needed.  - Collaborate with case management/ for discharge needs.  - Include patient/family/caregiver in decisions related to communication.  Outcome: Progressing     Problem: Potential for Aspiration  Goal: Non-ventilated patient's risk of aspiration is minimized  Description: Assess and monitor vital signs, respiratory status, and labs (WBC).  Monitor for signs of aspiration (tachypnea, cough, rales, wheezing, cyanosis, fever).    - Assess and monitor patient's ability to swallow.  - Place patient up in chair to eat if possible.  - HOB up at 90 degrees to eat if unable to get patient up into chair.  - Supervise patient during oral intake.   - Instruct patient/ family to take small bites.  - Instruct patient/ family to take small single sips when taking liquids.  - Follow patient-specific strategies generated by speech pathologist.  Outcome: Progressing  Goal: Ventilated patient's risk of aspiration is minimized  Description: Assess and monitor vital signs, respiratory status, airway cuff pressure, and labs (WBC).  Monitor for signs of aspiration (tachypnea, cough, rales, wheezing, cyanosis, fever).    - Elevate head of bed 30 degrees if patient has tube feeding.  - Monitor tube feeding.  Outcome: Progressing     Problem: Nutrition  Goal: Nutrition/Hydration status is improving  Description: Monitor and assess patient's nutrition/hydration status for malnutrition (ex- brittle hair, bruises, dry skin, pale skin and conjunctiva, muscle  wasting, smooth red tongue, and disorientation). Collaborate with interdisciplinary team and initiate plan and interventions as ordered.  Monitor patient's weight and dietary intake as ordered or per policy. Utilize nutrition screening tool and intervene per policy. Determine patient's food preferences and provide high-protein, high-caloric foods as appropriate.     - Assist patient with eating.  - Allow adequate time for meals.  - Encourage patient to take dietary supplement as ordered.  - Collaborate with clinical nutritionist.  - Include patient/family/caregiver in decisions related to nutrition.  Outcome: Progressing

## 2024-10-22 NOTE — ASSESSMENT & PLAN NOTE
Patient with right upper extremity numbness, stroke alert initiated in the ER  CTA head/neck negative for any large vessel obstruction  Admitted under stroke pathway  Continue neurochecks per protocol  MRI brain  Aspirin/statin  PT/OT/speech eval  Echocardiogram  Neurology consult  Monitor on telemetry

## 2024-10-22 NOTE — ASSESSMENT & PLAN NOTE
-Patient described infrequent migraine headaches and he is to avoid triptan in the light of developing TIA/CVA this morning.

## 2024-10-22 NOTE — SPEECH THERAPY NOTE
Speech Language/Pathology  Consult received.  Records reviewed.  Pt admitted c symptoms concerning for CVA/TIA.  Pt passed RN Dysphagia Assessment.  Communication deficits and swallowing difficulties denied.  NIH score 0.  CTA stroke alert (head/neck) No large vessel occlusion, proximal high-grade stenosis or aneurysm involving the [place] of [place] s MRI   pending.  No additional inpatient Speech Pathology evaluation appears indicated at this time.  Please re-consult if additional concerns arise. Thank you.

## 2024-10-22 NOTE — ASSESSMENT & PLAN NOTE
7 mm right upper lobe pulmonary nodule  Patient does have a history of tobacco use in the past  Reviewed finding with patient.  He demonstrates understanding of finding and need for follow-up  Repeat CT chest in 3 months

## 2024-10-23 ENCOUNTER — ANESTHESIA (OUTPATIENT)
Dept: NON INVASIVE DIAGNOSTICS | Facility: HOSPITAL | Age: 49
End: 2024-10-23
Payer: COMMERCIAL

## 2024-10-23 ENCOUNTER — TELEPHONE (OUTPATIENT)
Age: 49
End: 2024-10-23

## 2024-10-23 ENCOUNTER — ANESTHESIA EVENT (OUTPATIENT)
Dept: NON INVASIVE DIAGNOSTICS | Facility: HOSPITAL | Age: 49
End: 2024-10-23
Payer: COMMERCIAL

## 2024-10-23 ENCOUNTER — APPOINTMENT (OUTPATIENT)
Dept: NON INVASIVE DIAGNOSTICS | Facility: HOSPITAL | Age: 49
End: 2024-10-23
Payer: COMMERCIAL

## 2024-10-23 ENCOUNTER — TRANSITIONAL CARE MANAGEMENT (OUTPATIENT)
Dept: FAMILY MEDICINE CLINIC | Facility: CLINIC | Age: 49
End: 2024-10-23

## 2024-10-23 VITALS
DIASTOLIC BLOOD PRESSURE: 72 MMHG | HEART RATE: 62 BPM | TEMPERATURE: 97.7 F | WEIGHT: 232 LBS | OXYGEN SATURATION: 95 % | RESPIRATION RATE: 16 BRPM | BODY MASS INDEX: 35.16 KG/M2 | SYSTOLIC BLOOD PRESSURE: 118 MMHG | HEIGHT: 68 IN

## 2024-10-23 DIAGNOSIS — I63.9 CEREBROVASCULAR ACCIDENT (CVA), UNSPECIFIED MECHANISM (HCC): Primary | ICD-10-CM

## 2024-10-23 LAB
ANION GAP SERPL CALCULATED.3IONS-SCNC: 7 MMOL/L (ref 4–13)
ATRIAL RATE: 62 BPM
ATRIAL RATE: 73 BPM
BUN SERPL-MCNC: 13 MG/DL (ref 5–25)
CALCIUM SERPL-MCNC: 9.1 MG/DL (ref 8.4–10.2)
CHLORIDE SERPL-SCNC: 104 MMOL/L (ref 96–108)
CHOLEST SERPL-MCNC: 156 MG/DL
CO2 SERPL-SCNC: 25 MMOL/L (ref 21–32)
CREAT SERPL-MCNC: 1 MG/DL (ref 0.6–1.3)
DEPRECATED AT III PPP: 108 % OF NORMAL (ref 92–136)
ERYTHROCYTE [DISTWIDTH] IN BLOOD BY AUTOMATED COUNT: 12.4 % (ref 11.6–15.1)
EST. AVERAGE GLUCOSE BLD GHB EST-MCNC: 100 MG/DL
GFR SERPL CREATININE-BSD FRML MDRD: 88 ML/MIN/1.73SQ M
GLUCOSE P FAST SERPL-MCNC: 88 MG/DL (ref 65–99)
GLUCOSE SERPL-MCNC: 88 MG/DL (ref 65–140)
HBA1C MFR BLD: 5.1 %
HCT VFR BLD AUTO: 41.7 % (ref 36.5–49.3)
HDLC SERPL-MCNC: 53 MG/DL
HGB BLD-MCNC: 14.4 G/DL (ref 12–17)
LDLC SERPL CALC-MCNC: 91 MG/DL (ref 0–100)
MAGNESIUM SERPL-MCNC: 1.9 MG/DL (ref 1.9–2.7)
MCH RBC QN AUTO: 29.6 PG (ref 26.8–34.3)
MCHC RBC AUTO-ENTMCNC: 34.5 G/DL (ref 31.4–37.4)
MCV RBC AUTO: 86 FL (ref 82–98)
P AXIS: 73 DEGREES
P AXIS: 73 DEGREES
PLATELET # BLD AUTO: 163 THOUSANDS/UL (ref 149–390)
PMV BLD AUTO: 9.5 FL (ref 8.9–12.7)
POTASSIUM SERPL-SCNC: 3.7 MMOL/L (ref 3.5–5.3)
PR INTERVAL: 152 MS
PR INTERVAL: 164 MS
QRS AXIS: 68 DEGREES
QRS AXIS: 72 DEGREES
QRSD INTERVAL: 84 MS
QRSD INTERVAL: 84 MS
QT INTERVAL: 358 MS
QT INTERVAL: 374 MS
QTC INTERVAL: 379 MS
QTC INTERVAL: 394 MS
RBC # BLD AUTO: 4.87 MILLION/UL (ref 3.88–5.62)
SL CV LV EF: 60
SODIUM SERPL-SCNC: 136 MMOL/L (ref 135–147)
T WAVE AXIS: 58 DEGREES
T WAVE AXIS: 60 DEGREES
TRIGL SERPL-MCNC: 59 MG/DL
VENTRICULAR RATE: 62 BPM
VENTRICULAR RATE: 73 BPM
WBC # BLD AUTO: 5.08 THOUSAND/UL (ref 4.31–10.16)

## 2024-10-23 PROCEDURE — 99239 HOSP IP/OBS DSCHRG MGMT >30: CPT | Performed by: INTERNAL MEDICINE

## 2024-10-23 PROCEDURE — 80061 LIPID PANEL: CPT | Performed by: INTERNAL MEDICINE

## 2024-10-23 PROCEDURE — 83036 HEMOGLOBIN GLYCOSYLATED A1C: CPT | Performed by: INTERNAL MEDICINE

## 2024-10-23 PROCEDURE — 85027 COMPLETE CBC AUTOMATED: CPT | Performed by: INTERNAL MEDICINE

## 2024-10-23 PROCEDURE — 80048 BASIC METABOLIC PNL TOTAL CA: CPT | Performed by: INTERNAL MEDICINE

## 2024-10-23 PROCEDURE — NC001 PR NO CHARGE: Performed by: NURSE PRACTITIONER

## 2024-10-23 PROCEDURE — 93312 ECHO TRANSESOPHAGEAL: CPT | Performed by: INTERNAL MEDICINE

## 2024-10-23 PROCEDURE — 99449 NTRPROF PH1/NTRNET/EHR 31/>: CPT | Performed by: PHYSICAL MEDICINE & REHABILITATION

## 2024-10-23 PROCEDURE — 93010 ELECTROCARDIOGRAM REPORT: CPT | Performed by: INTERNAL MEDICINE

## 2024-10-23 PROCEDURE — 93325 DOPPLER ECHO COLOR FLOW MAPG: CPT | Performed by: INTERNAL MEDICINE

## 2024-10-23 PROCEDURE — 83735 ASSAY OF MAGNESIUM: CPT | Performed by: INTERNAL MEDICINE

## 2024-10-23 PROCEDURE — 93320 DOPPLER ECHO COMPLETE: CPT | Performed by: INTERNAL MEDICINE

## 2024-10-23 RX ORDER — ASPIRIN 81 MG/1
81 TABLET, CHEWABLE ORAL DAILY
Qty: 30 TABLET | Refills: 0 | Status: SHIPPED | OUTPATIENT
Start: 2024-10-24

## 2024-10-23 RX ORDER — PROPOFOL 10 MG/ML
INJECTION, EMULSION INTRAVENOUS AS NEEDED
Status: DISCONTINUED | OUTPATIENT
Start: 2024-10-23 | End: 2024-10-23

## 2024-10-23 RX ORDER — ATORVASTATIN CALCIUM 40 MG/1
40 TABLET, FILM COATED ORAL EVERY EVENING
Qty: 30 TABLET | Refills: 0 | Status: SHIPPED | OUTPATIENT
Start: 2024-10-23

## 2024-10-23 RX ORDER — LIDOCAINE HYDROCHLORIDE 10 MG/ML
INJECTION, SOLUTION EPIDURAL; INFILTRATION; INTRACAUDAL; PERINEURAL AS NEEDED
Status: DISCONTINUED | OUTPATIENT
Start: 2024-10-23 | End: 2024-10-23

## 2024-10-23 RX ORDER — ALBUTEROL SULFATE 0.83 MG/ML
SOLUTION RESPIRATORY (INHALATION)
Status: COMPLETED
Start: 2024-10-23 | End: 2024-10-23

## 2024-10-23 RX ORDER — SODIUM CHLORIDE, SODIUM LACTATE, POTASSIUM CHLORIDE, CALCIUM CHLORIDE 600; 310; 30; 20 MG/100ML; MG/100ML; MG/100ML; MG/100ML
INJECTION, SOLUTION INTRAVENOUS CONTINUOUS PRN
Status: DISCONTINUED | OUTPATIENT
Start: 2024-10-23 | End: 2024-10-23

## 2024-10-23 RX ORDER — CLOPIDOGREL BISULFATE 75 MG/1
75 TABLET ORAL DAILY
Qty: 30 TABLET | Refills: 0 | Status: SHIPPED | OUTPATIENT
Start: 2024-10-24

## 2024-10-23 RX ORDER — ALBUTEROL SULFATE 0.83 MG/ML
2.5 SOLUTION RESPIRATORY (INHALATION) ONCE
Status: COMPLETED | OUTPATIENT
Start: 2024-10-23 | End: 2024-10-23

## 2024-10-23 RX ADMIN — PROPOFOL 150 MCG/KG/MIN: 10 INJECTION, EMULSION INTRAVENOUS at 08:25

## 2024-10-23 RX ADMIN — SODIUM CHLORIDE, SODIUM LACTATE, POTASSIUM CHLORIDE, AND CALCIUM CHLORIDE: .6; .31; .03; .02 INJECTION, SOLUTION INTRAVENOUS at 08:13

## 2024-10-23 RX ADMIN — ALBUTEROL SULFATE 2.5 MG: 0.83 SOLUTION RESPIRATORY (INHALATION) at 09:38

## 2024-10-23 RX ADMIN — LIDOCAINE HYDROCHLORIDE 50 MG: 10 INJECTION, SOLUTION EPIDURAL; INFILTRATION; INTRACAUDAL; PERINEURAL at 08:24

## 2024-10-23 RX ADMIN — ALBUTEROL SULFATE 2.5 MG: 2.5 SOLUTION RESPIRATORY (INHALATION) at 09:38

## 2024-10-23 RX ADMIN — PROPOFOL 150 MG: 10 INJECTION, EMULSION INTRAVENOUS at 08:24

## 2024-10-23 RX ADMIN — CLOPIDOGREL 75 MG: 75 TABLET ORAL at 11:17

## 2024-10-23 RX ADMIN — ASPIRIN 81 MG 81 MG: 81 TABLET ORAL at 11:17

## 2024-10-23 NOTE — ASSESSMENT & PLAN NOTE
MRI brain confirmed acute stroke  Reviewed case with neurology  Patient was initially loaded with aspirin and Plavix  Echocardiogram performed.  VSD and PFO noted   MODESTA performed on 10/23/2024  Patient back to baseline neurologic status.  NIH stroke scale 0 at this time  Ambulatory referral to neurology on discharge

## 2024-10-23 NOTE — NURSING NOTE
Patient IV removed with catheter tip intact. DSD and pressure applied. AVS was reviewed with patient and family at the bedside with virtual nurse. No further questions or concerns at this time.

## 2024-10-23 NOTE — CONSULTS
Consultation - Neurology   Name: Pedrito Gaines 49 y.o. male I MRN: [id number]  Unit/Bed#: -01 I Date of Admission: 10/22/2024   Date of Service: 10/23/2024 I Hospital Day: 0   Consults  Physician Requesting Evaluation: Milka Hutchison, *   Reason for Evaluation / Principal Problem: Acute stroke    Assessment & Plan  Stroke-like symptoms  Mr. Gaines has presented to [facility] ED for evaluation of right upper extremity numbness and weakness in addition to right facial sensory dysfunction and weakness with blurry vision.  Stroke alert was initiated and patient required CT scan of the head and CT angiogram to be completed with no pathology noted and patient did not require thrombosis due to minimal and improving neurological deficit.    At this point consideration was TIA/CVA versus complex migraine.    Work up:  No CT evidence of large acute vascular territory infarct, intracranial hemorrhage or mass effect.     CTA H/N: No large vessel occlusion, proximal high-grade stenosis or aneurysm involving the [place].     -Within limitations of streak artifact no high-grade stenosis or dissection of the carotid or vertebral arteries.    Serologic GUIDRY: CBC.CMP normal, PT/INR nl    2DECho: EF 60% IVS: There appears to be a possible small, apical muscular ventricular septal defect with left to right shunting indicated by color flow Doppler.    MRI brain:  Small focus of acute/recent ischemia involving the left superior parietal lobe. No hemorrhage or large territory infarct seen    MODESTA 1023/2024:  Left Ventricle: Left ventricular cavity size is normal. The left ventricular ejection fraction is 60%. Systolic function is normal. Wall motion is normal.    IVS: There is a possible small, apical muscular ventricular septal defect with left to right shunting indicated by color flow Doppler.    Left Atrium: The atrium is mildly dilated.    Atrial Septum: There is a patent foramen ovale confirmed at rest with  predominant right to left shunting using saline contrast.    Mitral Valve: There is systolic bowing of the anterior leaflet.    PLAN:    Admit to Stroke pathway:    ·  MRI brain with acute embolic stroke in the left superior lobe, likely embolic in origin;     · completed  Echo+ MODESTA ( see above), PFO with right to left shunting and ventricular septal defect with left to right shunting.  Patient would benefit from following with cardiology for ZIO patch      · Recommend the following Labs    ? Lipid Panel LDL 91 mg/dL with a goal <70 mg/dL, patient is to continue atorvastatin    ? Hemoglobin A1c- pending    ? Thrombosis panel- pending     · Recommend loading aspirin 325 mg and Plavix 300 mg PO then continue on DAPT; AVOID triptan for migraine     · Patient is to avoid dehydration    · Permissive HTN, allow for SBP up to 220 x 24 hours or Goal MAP> 100 from onset of stroke like symptoms, then goal normotension.   · Euglycemic, normothermic goal    · Continue telemetry.    · Colonoscopy completed previously with no signs of cancerous process reported.    · Stroke education    · Medical management and supportive care per primary team. Correction of any metabolic or infectious disturbances.      Ocular migraine  -Patient described infrequent migraine headaches and he is to avoid triptan in the light of developing TIA/CVA this morning.  VSD (ventricular septal defect)        Pedrito Gaines will need follow up in in 3 months with neurovascular attending. He will not require outpatient neurological testing.    History of Present Illness   Pedrito Gaines is a 49 y.o. right handed male who presents with left parietal stroke.    Patient described no new sensorimotor dysfunction, patient feels well.  Patient has lost a question about the region of left superior parietal lobe stroke-patient and his wife were able to see MRI findings for the camera.  Patient is to consider staying hydrated and work on his cholesterol slightly  lowering his current level to what recommended.    Healthy dietary approach and regular physical activity has been advised.    Review of Systems   12 points of review of system was reviewed with the patient and was unremarkable with exception: see HPI  I have reviewed the patient's PMH, PSH, Social History, Family History, Meds, and Allergies    Objective :  Temp:  [97.2 °F (36.2 °C)-98.4 °F (36.9 °C)] 97.7 °F (36.5 °C)  HR:  [57-96] 62  BP: ()/(58-89) 118/72  Resp:  [16-22] 16  SpO2:  [91 %-98 %] 95 %  O2 Device: None (Room air)    Physical ExamNeurological Exam  CONSTITUTIONAL: NAD, pleasant. NECK: supple, PSYCH: appropriate mood and affect  NEUROLOGIC COMPREHENSIVE EXAM: Patient is oriented to person, place and time, NAD; appropriate affect. CN II, III, IV, V, VI, VII,VIII,IX,X,XI-XII intact with EOMI,  VF grossly intact, ; symmetric face noted. Motor: Grossly intact UE/LE bilateral symmetric; Coordination within normal limits on FTN and SERGEY testing; Tandem gait is intact. Romberg: absent.    Lab Results: I have reviewed the following results:I have personally reviewed pertinent reports.    Recent Labs     10/23/24  0513   WBC 5.08   HGB 14.4   HCT 41.7      SODIUM 136   K 3.7      CO2 25   BUN 13   CREATININE 1.00   GLUC 88   MG 1.9     Imaging Results Review: I personally reviewed the following image studies/reports in PACS and discussed pertinent findings with Radiology: CT head and MRI brain. My interpretation of the radiology images/reports is: Left parietal stroke.      VTE Prophylaxis: Sequential compression device (Venodyne)     Administrative Statements     VIRTUAL CARE DOCUMENTATION:     1. This service was provided via Telemedicine using [employer]     2. Parties in the room with patient during teleconsult Family member: Patient's wife     3. Confidentiality My office door was closed     4. Participants No one else was in the room    5. Patient acknowledged consent and  understanding of privacy and security of the  Telemedicine consult. I informed the patient that I have reviewed their record in Epic and presented the opportunity for them to ask any questions regarding the visit today.  The patient agreed to participate.    6. I have spent a total time of 30 minutes in caring for this patient on the day of the visit/encounter including Diagnostic results, Prognosis, Risks and benefits of tx options, Impressions, Counseling / Coordination of care, Documenting in the medical record, Reviewing / ordering tests, medicine, procedures  , and Obtaining or reviewing history  .

## 2024-10-23 NOTE — ANESTHESIA POSTPROCEDURE EVALUATION
Post-Op Assessment Note    CV Status:  Stable    Pain management: adequate       Mental Status:  Alert and awake   Hydration Status:  Euvolemic   PONV Controlled:  Controlled   Airway Patency:  Patent     Post Op Vitals Reviewed: Yes    No anethesia notable event occurred.    Staff: Anesthesiologist           Last Filed PACU Vitals:  Vitals Value Taken Time   Temp 97.6 °F (36.4 °C) 10/23/24 0900   Pulse 72 10/23/24 1005   /69 10/23/24 1005   Resp 19 10/23/24 1005   SpO2 95 % 10/23/24 1005   Vitals shown include unfiled device data.    Modified Ritesh:  Activity: 2 (10/23/2024 10:00 AM)  Respiration: 2 (10/23/2024 10:00 AM)  Circulation: 2 (10/23/2024 10:00 AM)  Consciousness: 2 (10/23/2024 10:00 AM)  Oxygen Saturation: 2 (10/23/2024 10:00 AM)  Modified Ritesh Score: 10 (10/23/2024 10:00 AM)

## 2024-10-23 NOTE — ANESTHESIA POSTPROCEDURE EVALUATION
Post-Op Assessment Note    CV Status:  Stable  Pain Score: 0    Pain management: adequate       Mental Status:  Sleepy   Hydration Status:  Euvolemic   PONV Controlled:  Controlled   Airway Patency:  Patent     Post Op Vitals Reviewed: Yes    No anethesia notable event occurred.    Staff: CRNA       Last Filed PACU Vitals:  Vitals Value Taken Time   Temp     Pulse 84 10/23/24 0903   /63 10/23/24 0902   Resp 20 10/23/24 0903   SpO2 95 % 10/23/24 0903   Vitals shown include unfiled device data.    Modified Ritesh:  No data recorded

## 2024-10-23 NOTE — NUTRITION
"   10/23/24 1332   Biochemical Data,Medical Tests, and Procedures   Biochemical Data/Medical Tests/Procedures Lab values reviewed;Meds reviewed   Labs (Comment) 10/23/2024 labs reviewed, WNL   Meds (Comment) Aspirin, atorvastatin, Plavix   Nutrition-Focused Physical Exam   Nutrition-Focused Physical Exam Findings RN skin assessment reviewed;No edema documented;No skin issues documented   Nutrition-Focused Physical Exam Findings Appears adequately nourished   Medical-Related Concerns TBI, acute embolic stroke   Adequacy of Intake   Nutrition Modality PO   Feeding Route   PO Independent   Current PO Intake   Current Diet Order Cardiac diet, thin liquids   Current Meal Intake %   Estimated calorie intake compared to estimated need Nutrient needs are met   PES Statement   Problem Behavioral-Environmental   Knowledge and Beliefs (1) Food- and nutrition-related knowledge deficit NB-1.1   Related to Other (Comment)  (Cardiac eating pattern, stroke prevention)   As evidenced by: Per patient/family interview   Recommendations/Interventions   Malnutrition/BMI Present No   Summary Consult-stroke.  Presents with strokelike symptoms.  Past medical history significant for TBI.  Weight history reviewed.  Data limited.  No significant changes.  No edema.  No pressure areas.  MODESTA procedure today.  Diet minced from NPO to cardiac postprocedure.  Patient reports having a good appetite.  Usually has 1 meal daily, dinner. Eats out 1-2 times weekly.  States he has been consuming more fish.  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  Reports overall limiting processed foods.   NKFA.  Denies dysphagia.  Reports weight is stable at 220#-225#. RD discussed diet as prescribed.  Provided and discussed \"stroke nutrition therapy\" handout.  He offers no nutrition questions.  RD to follow as needed.   Interventions/Recommendations Continue current diet order   Education Assessment   Education Education initiated/ completed   Education Notes " "RD discussed diet as prescribed. Provided and discussed \"stroke nutrition therapy\" handout. He offers no nutrition questions.   Patient Nutrition Goals   Goal Comprehend education;Improve to healthful diet   Goal Status Initiated   Timeframe to complete goal by next f/u   Nutrition Complexity Risk   Nutrition complexity level Low risk   Follow up date 10/30/24       "

## 2024-10-23 NOTE — DISCHARGE INSTR - AVS FIRST PAGE
Patient Education     Transesophageal Echocardiogram   Why is this procedure done?   An echocardiogram uses sound waves to make moving pictures of your heart. Transesophageal means this test is done from inside of your food pipe or esophagus. This test is also called a MODESTA, which stands for transesophageal echocardiogram. A MODESTA looks at how well your heart works. The doctor is trying to learn about any problems with your heart. The doctor may look at how the blood flows in the heart. The doctor also checks how well the heart pumps. A MODESTA gives a closer and clearer picture of the heart. Your doctor may be looking for problems like:  Weakening of the heart muscle  Heart attack  Problems with the heart that have been there since birth  Heart murmurs  Heart infection  Swollen area or a tear on a main blood vessel  Blood clots  A heart that is bigger than it should be  Problems with the heart valves  A tumor  Getting better pictures if your doctor is not able to see your heart well on a standard echo on your chest well  Doctors may use MODESTA if someone has had heart surgery, lung disease, or is very overweight. It may also be used during a cardiac cath and during open heart surgeries.     What will the results be?   The pictures are looked at by a heart doctor. The results may show if there are any problems with your heart.  What happens before the procedure?   Your doctor will take your history and do an exam. Talk to your doctor about:  All the drugs you are taking. Be sure to include all prescription and over-the-counter (OTC) drugs and herbal supplements. Tell the doctor about any drug allergy. Bring a list of drugs you take with you.  When you need to stop eating or drinking before your procedure.  If you have ulcers, a hiatal hernia, or problems breathing.  Do not drink beer, wine, and mixed drinks (alcohol) for a few days before the procedure because it may interfere with the sedative used.  You will not be allowed  to drive right away after the procedure. Ask a family member or a friend to drive you home.  What happens during the procedure?   In the procedure area, the staff will put an IV in your arm to give you fluids and drugs. You will be given a drug to make you sleepy. It will also help you stay pain free during the surgery.  The staff will attach ECG electrodes on your body to watch your heart rate. They will put a cuff on your arm to check your blood pressure. They will put a probe on your finger to check your oxygen levels during the procedure.  Your doctor will have you gargle a drug or will spray a drug in your throat to numb the area. Most of the time, your doctor will also give you additional medicine to make you sleepy.  In some cases, an anesthesia doctor will put you completely to sleep for the procedure.  You will be asked to lie on your left side. They may put a small tube in your nose to help you breathe. They will place a guard in your mouth to protect your teeth and also to keep your mouth open during the procedure.  The doctor will slide the probe down your throat and into your food pipe. You may feel the probe as it moves, but it should not hurt. You should be able to breathe with the probe in place. The doctor will take pictures of your heart from different angles. Then, the probe is taken out.  This procedure takes less than 60 minutes.  What happens after the procedure?   You will be moved to a Recovery Room after procedure. The hospital staff will watch your heart rate and breathing until you are stable.  Talk to your doctor about when the results will be available.  You may go home after the procedure.  What care is needed at home?   Ask your doctor what you need to do when you go home. Make sure you ask questions if you do not understand what the doctor says.  Your throat may feel sore.  Do not eat or drink anything until the numbness in your throat wears off so that you don't choke. Then you may  drink fluids and eat soft foods until your throat feels normal.  For the next day or so, get lots of rest. Sleep when you are feeling tired. Avoid doing tiring activities.  Do not drive. Avoid using tools or machines, such as a lawnmower, for at least 1 day after the procedure.  What follow-up care is needed?   Your doctor may ask you to make visits to the office to check on your progress. Be sure to keep these visits.  The results will help your doctor understand what kind of problem you have with your heart. Together you can make a plan for more care.  Your doctor may send you to a heart specialist if the test results show a problem.  What problems could happen?   Painful swallowing  Bleeding  Problems breathing  Heart rhythm problems  Upset stomach  Damage or tear to the esophagus (this is very rare)  Last Reviewed Date   2021-11-05  Consumer Information Use and Disclaimer   This generalized information is a limited summary of diagnosis, treatment, and/or medication information. It is not meant to be comprehensive and should be used as a tool to help the user understand and/or assess potential diagnostic and treatment options. It does NOT include all information about conditions, treatments, medications, side effects, or risks that may apply to a specific patient. It is not intended to be medical advice or a substitute for the medical advice, diagnosis, or treatment of a health care provider based on the health care provider's examination and assessment of a patient’s specific and unique circumstances. Patients must speak with a health care provider for complete information about their health, medical questions, and treatment options, including any risks or benefits regarding use of medications. This information does not endorse any treatments or medications as safe, effective, or approved for treating a specific patient. [employer] and its affiliates disclaim any warranty or liability relating to this  information or the use thereof. The use of this information is governed by the Terms of Use, available at https://www.wolterskluwer.com/en/know/clinical-effectiveness-terms   Copyright   Copyright © 2024 [employer] and its affiliates and/or licensors. All rights reserved.

## 2024-10-23 NOTE — ASSESSMENT & PLAN NOTE
Mr. Gaines has presented to [facility] ED for evaluation of right upper extremity numbness and weakness in addition to right facial sensory dysfunction and weakness with blurry vision.  Stroke alert was initiated and patient required CT scan of the head and CT angiogram to be completed with no pathology noted and patient did not require thrombosis due to minimal and improving neurological deficit.    At this point consideration was TIA/CVA versus complex migraine.    Work up:  No CT evidence of large acute vascular territory infarct, intracranial hemorrhage or mass effect.     CTA H/N: No large vessel occlusion, proximal high-grade stenosis or aneurysm involving the [place] of [place].     -Within limitations of streak artifact no high-grade stenosis or dissection of the carotid or vertebral arteries.    Serologic GUIDRY: CBC.CMP normal, PT/INR nl    2DECho: EF 60% IVS: There appears to be a possible small, apical muscular ventricular septal defect with left to right shunting indicated by color flow Doppler.    MRI brain:  Small focus of acute/recent ischemia involving the left superior parietal lobe. No hemorrhage or large territory infarct seen    MODESTA 1023/2024:  Left Ventricle: Left ventricular cavity size is normal. The left ventricular ejection fraction is 60%. Systolic function is normal. Wall motion is normal.    IVS: There is a possible small, apical muscular ventricular septal defect with left to right shunting indicated by color flow Doppler.    Left Atrium: The atrium is mildly dilated.    Atrial Septum: There is a patent foramen ovale confirmed at rest with predominant right to left shunting using saline contrast.    Mitral Valve: There is systolic bowing of the anterior leaflet.    PLAN:    Admit to Stroke pathway:    ·  MRI brain with acute embolic stroke in the left superior lobe, likely embolic in origin;     · completed  Echo+ MODESTA ( see above), PFO with right to left shunting and ventricular septal  defect with left to right shunting.  Patient would benefit from following with cardiology for ZIO patch      · Recommend the following Labs    ? Lipid Panel LDL 91 mg/dL with a goal <70 mg/dL, patient is to continue atorvastatin    ? Hemoglobin A1c- pending    ? Thrombosis panel- pending     · Recommend loading aspirin 325 mg and Plavix 300 mg PO then continue on DAPT; AVOID triptan for migraine     · Patient is to avoid dehydration    · Permissive HTN, allow for SBP up to 220 x 24 hours or Goal MAP> 100 from onset of stroke like symptoms, then goal normotension.   · Euglycemic, normothermic goal    · Continue telemetry.    · Colonoscopy completed previously with no signs of cancerous process reported.    · Stroke education    · Medical management and supportive care per primary team. Correction of any metabolic or infectious disturbances.

## 2024-10-23 NOTE — ANESTHESIA POSTPROCEDURE EVALUATION
Post-Op Assessment Note    Last Filed PACU Vitals:  Vitals Value Taken Time   Temp     Pulse 89 10/23/24 0902   /63 10/23/24 0902   Resp 22 10/23/24 0902   SpO2 95 % 10/23/24 0902   Vitals shown include unfiled device data.    Modified Ritesh:  No data recorded

## 2024-10-23 NOTE — INCIDENTAL FINDINGS
The following findings require follow up:  Radiographic finding   Findin mm right upper lobe pulmonary nodule with surrounding groundglass. While findings may be infectious or inflammatory recommend 3-month follow-up chest CT to ensure resolution    Follow up required: CT chest in 3 months   Follow up should be done within 3 month(s)    Please notify the following clinician to assist with the follow up:   Dr. Zheng    Incidental finding results were discussed with the Patient by Milka Hutchison MD on 10/23/24.   They expressed understanding and all questions answered.

## 2024-10-23 NOTE — PROGRESS NOTES
-Patient seen and examined.  Patient denies any significant symptoms at this time and overall notes feeling well.  He denies any chest pain, palpitations, lightness or dizziness, loss consciousness, shortness of breath, lower extremity edema, orthopnea or bendopnea.  He denies any significant muscle weakness.    -Patient does note some cracked teeth chronically but denies any loose teeth.  He denies any esophageal surgeries or issues with swallowing.  He denies any previous issues with anesthesia.    -Risks, benefits, alternatives to procedure explained to patient and his wife who was at bedside in full and all questions answered.  Patient understood and was agreeable to undergoing transesophageal echocardiogram requested by neurology team.  Written and verbal consent obtained from patient.  Will plan to proceed with transesophageal echocardiogram as requested.

## 2024-10-23 NOTE — PLAN OF CARE
Problem: PAIN - ADULT  Goal: Verbalizes/displays adequate comfort level or baseline comfort level  Description: Interventions:  - Encourage patient to monitor pain and request assistance  - Assess pain using appropriate pain scale  - Administer analgesics based on type and severity of pain and evaluate response  - Implement non-pharmacological measures as appropriate and evaluate response  - Consider cultural and social influences on pain and pain management  - Notify physician/advanced practitioner if interventions unsuccessful or patient reports new pain  10/23/2024 1251 by Meka Palma RN  Outcome: Adequate for Discharge  10/23/2024 1132 by Meka Palma RN  Outcome: Progressing     Problem: INFECTION - ADULT  Goal: Absence or prevention of progression during hospitalization  Description: INTERVENTIONS:  - Assess and monitor for signs and symptoms of infection  - Monitor lab/diagnostic results  - Monitor all insertion sites, i.e. indwelling lines, tubes, and drains  - Monitor endotracheal if appropriate and nasal secretions for changes in amount and color  - [place] appropriate cooling/warming therapies per order  - Administer medications as ordered  - Instruct and encourage patient and family to use good hand hygiene technique  - Identify and instruct in appropriate isolation precautions for identified infection/condition  Outcome: Adequate for Discharge     Problem: SAFETY ADULT  Goal: Patient will remain free of falls  Description: INTERVENTIONS:  - Educate patient/family on patient safety including physical limitations  - Instruct patient to call for assistance with activity   - Consult OT/PT to assist with strengthening/mobility   - Keep Call bell within reach  - Keep bed low and locked with side rails adjusted as appropriate  - Keep care items and personal belongings within reach  - Initiate and maintain comfort rounds  - Make Fall Risk Sign visible to staff  - Offer Toileting every 2 Hours, in advance  of need  - Initiate/Maintain bed alarm  - Obtain necessary fall risk management equipment: non skid socks  - Apply yellow socks and bracelet for high fall risk patients  - Consider moving patient to room near nurses station  Outcome: Adequate for Discharge     Problem: DISCHARGE PLANNING  Goal: Discharge to home or other facility with appropriate resources  Description: INTERVENTIONS:  - Identify barriers to discharge w/patient and caregiver  - Arrange for needed discharge resources and transportation as appropriate  - Identify discharge learning needs (meds, wound care, etc.)  - Refer to Case Management Department for coordinating discharge planning if the patient needs post-hospital services based on physician/advanced practitioner order or complex needs related to functional status, cognitive ability, or social support system  Outcome: Adequate for Discharge     Problem: Knowledge Deficit  Goal: Patient/family/caregiver demonstrates understanding of disease process, treatment plan, medications, and discharge instructions  Description: Complete learning assessment and assess knowledge base.  Interventions:  - Provide teaching at level of understanding  - Provide teaching via preferred learning methods  Outcome: Adequate for Discharge     Problem: Neurological Deficit  Goal: Neurological status is stable or improving  Description: Interventions:  - Monitor and assess patient's level of consciousness, motor function, sensory function, and level of assistance needed for ADLs.   - Monitor and report changes from baseline. Collaborate with interdisciplinary team to initiate plan and implement interventions as ordered.   - Provide and maintain a safe environment.  - Consider seizure precautions.  - Consider fall precautions.  - Consider aspiration precautions.  - Consider bleeding precautions.  Outcome: Adequate for Discharge     Problem: Activity Intolerance/Impaired Mobility  Goal: Mobility/activity is maintained at  optimum level for patient  Description: Interventions:  - Assess and monitor patient  barriers to mobility and need for assistive/adaptive devices.  - Assess patient's emotional response to limitations.  - Collaborate with interdisciplinary team and initiate plans and interventions as ordered.  - Encourage independent activity per ability.  - Maintain proper body alignment.  - Perform active/passive rom as tolerated/ordered.  - Plan activities to conserve energy.  - Turn patient as appropriate  Outcome: Adequate for Discharge     Problem: Communication Impairment  Goal: Ability to express needs and understand communication  Description: Assess patient's communication skills and ability to understand information.  Patient will demonstrate use of effective communication techniques, alternative methods of communication and understanding even if not able to speak.     - Encourage communication and provide alternate methods of communication as needed.  - Collaborate with case management/ for discharge needs.  - Include patient/family/caregiver in decisions related to communication.  Outcome: Adequate for Discharge     Problem: Nutrition  Goal: Nutrition/Hydration status is improving  Description: Monitor and assess patient's nutrition/hydration status for malnutrition (ex- brittle hair, bruises, dry skin, pale skin and conjunctiva, muscle wasting, smooth red tongue, and disorientation). Collaborate with interdisciplinary team and initiate plan and interventions as ordered.  Monitor patient's weight and dietary intake as ordered or per policy. Utilize nutrition screening tool and intervene per policy. Determine patient's food preferences and provide high-protein, high-caloric foods as appropriate.     - Assist patient with eating.  - Allow adequate time for meals.  - Encourage patient to take dietary supplement as ordered.  - Collaborate with clinical nutritionist.  - Include patient/family/caregiver in  decisions related to nutrition.  Outcome: Adequate for Discharge

## 2024-10-23 NOTE — QUICK NOTE
Pedrito Gaines is a 49 y.o. y.o. male who follows presents today for transesophageal echocardiogram at request of neurology team given recent CVA.    Risk, benefits, alternatives of procedure explained to patient in full and all questions answered with written and verbal consent obtained.      The patient was identified in the OR. A time out procedure was performed.    The patient was sedated by the anesthesia service per anesthesia protocol.    After adequate sedation, a MODESTA was performed. Please see separate report for full details.  Briefly, the LV function was 60%, and there was no left atrial or left atrial appendage thrombus identified.       There were no complications.    The patient was monitored for the appropriate time interval in the holding area, and was transferred back to the medical floor in stable condition.    Lobo Carlson, DO

## 2024-10-23 NOTE — DISCHARGE SUMMARY
Discharge Summary - Hospitalist   Name: Pedrito Gaines 49 y.o. male I MRN: [id number]  Unit/Bed#: -01 I Date of Admission: 10/22/2024   Date of Service: 10/23/2024 I Hospital Day: 0     Assessment & Plan  Acute embolic stroke (HCC)  MRI brain confirmed acute stroke  Reviewed case with neurology  Patient was initially loaded with aspirin and Plavix  Echocardiogram performed.  VSD and PFO noted   MODESTA performed on 10/23/2024  Patient back to baseline neurologic status.  NIH stroke scale 0 at this time  Ambulatory referral to neurology on discharge  Ocular migraine  Pain control as needed  Pulmonary nodule  7 mm right upper lobe pulmonary nodule  Patient does have a history of tobacco use in the past  Reviewed finding with patient.  He demonstrates understanding of finding and need for follow-up  Repeat CT chest in 3 months  VSD (ventricular septal defect)  Cardiology input appreciated.  Outpatient follow-up with cardiology recommended     Medical Problems       Resolved Problems  Date Reviewed: 7/12/2023   None       Discharging Physician / Practitioner: Milka Hutchison MD  PCP: Devin Zheng MD  Admission Date:   Admission Orders (From admission, onward)       Ordered        10/22/24 0732  Place in Observation  Once                          Discharge Date: 10/23/24    Consultations During Hospital Stay:  Cardiology, neurology    Procedures Performed:   MODESTA    Significant Findings / Test Results:   Stroke    Incidental Findings:   Pulmonary nodule  I reviewed the above mentioned incidental findings with the patient and/or family and they expressed understanding.    Test Results Pending at Discharge (will require follow up):   Thrombosis panel     Outpatient Tests Requested:  Routine labs with PCP as outpatient    Complications:  none    Reason for Admission: Stroke    Hospital Course:   Pedrito Gaines is a 49 y.o. male patient who originally presented to the hospital on 10/22/2024 due to right hand numbness.   "Patient admitted for stroke workup.  MRI brain did confirm stroke.  Neurology consulted.  TTE performed and showed VSD as well as PFO.  Given concern for embolic stroke, neurology recommended MODESAT.  MODESTA performed today.  Please see results for full details.  Patient will be discharged on aspirin, Plavix, statin.  Outpatient follow-up with neurology, cardiology.  Advised to return with any worsening symptoms.    Please see above list of diagnoses and related plan for additional information.     Condition at Discharge: stable    Discharge Day Visit / Exam:   Subjective: No complaints at this time  Vitals: Blood Pressure: 118/72 (10/23/24 1100)  Pulse: 62 (10/23/24 1100)  Temperature: 97.7 °F (36.5 °C) (10/23/24 1100)  Temp Source: Oral (10/23/24 1100)  Respirations: 16 (10/23/24 1100)  Height: 5' 8\" (172.7 cm) (10/23/24 0807)  Weight - Scale: 105 kg (232 lb) (10/23/24 0807)  SpO2: 95 % (10/23/24 1100)  Physical Exam  Constitutional:       General: He is not in acute distress.  HENT:      Head: Normocephalic and atraumatic.      Nose: Nose normal.      Mouth/Throat:      Mouth: Mucous membranes are moist.   Eyes:      Extraocular Movements: Extraocular movements intact.      Conjunctiva/sclera: Conjunctivae normal.   Cardiovascular:      Rate and Rhythm: Normal rate and regular rhythm.   Pulmonary:      Effort: Pulmonary effort is normal. No respiratory distress.   Abdominal:      Palpations: Abdomen is soft.      Tenderness: There is no abdominal tenderness.   Musculoskeletal:         General: Normal range of motion.      Cervical back: Normal range of motion and neck supple.   Skin:     General: Skin is warm and dry.   Neurological:      General: No focal deficit present.      Mental Status: He is alert. Mental status is at baseline.      Cranial Nerves: No cranial nerve deficit.   Psychiatric:         Mood and Affect: Mood normal.         Behavior: Behavior normal.          Discussion with Family: Updated contact " person (wife) at bedside.    Discharge instructions/Information to patient and family:   See after visit summary for information provided to patient and family.      Provisions for Follow-Up Care:  See after visit summary for information related to follow-up care and any pertinent home health orders.      Mobility at time of Discharge:   Basic Mobility Inpatient Raw Score: 24  JH-HLM Goal: 8: Walk 250 feet or more  JH-HLM Achieved: 6: Walk 10 steps or more  HLM Goal achieved. Continue to encourage appropriate mobility.     Disposition:   Home    Planned Readmission: no    Discharge Medications:  See after visit summary for reconciled discharge medications provided to patient and/or family.      Administrative Statements   Discharge Statement:  I have spent a total time of 35 minutes in caring for this patient on the day of the visit/encounter. >30 minutes of time was spent on: Counseling / Coordination of care, Documenting in the medical record, Reviewing / ordering tests, medicine, procedures  , and Communicating with other healthcare professionals .    **Please Note: This note may have been constructed using a voice recognition system**

## 2024-10-23 NOTE — ANESTHESIA PREPROCEDURE EVALUATION
Procedure:  MODESTA OR/GI ([place] ONLY)      Presented with stroke sx, found to have new left parietal lobe cva  Possible ASD/VSD noted on TTE    Relevant Problems   CARDIO   (+) Ocular migraine      NEURO/PSYCH   (+) Episodic tension-type headache, not intractable   (+) Ocular migraine      PULMONARY   (+) Exercise-induced asthma      Cardiovascular/Peripheral Vascular   (+) VSD (ventricular septal defect)      Respiratory/Allergy   (+) Pulmonary nodule      TTE 10/22    Left Ventricle: Left ventricular cavity size is normal. Wall thickness is normal. The left ventricular ejection fraction is 60%. Systolic function is normal. Wall motion is normal. Diastolic function is normal.    IVS: There appears to be a possible small, apical muscular ventricular septal defect with left to right shunting indicated by color flow Doppler.    Left Atrium: The atrium is mildly dilated.    Atrial Septum: There is a small and patent foramen ovale confirmed at rest with predominant right to left shunting using saline contrast.    Aortic Valve: There is mild regurgitation.    Mitral Valve: There is mild regurgitation.    Tricuspid Valve: There is mild regurgitation.    EKG 10/22  Normal sinus rhythm with sinus arrhythmia  Normal ECG  When compared with ECG of 22-OCT-2024 06:38, (unconfirmed)  No significant change was found        Physical Exam    Airway    Mallampati score: II  TM Distance: >3 FB  Neck ROM: full     Dental       Cardiovascular      Pulmonary      Other Findings        Anesthesia Plan  ASA Score- 3     Anesthesia Type- IV sedation with anesthesia with ASA Monitors.         Additional Monitors:     Airway Plan:     Comment: Recent labs personally reviewed:  Lab Results       Component                Value               Date                       WBC                      5.08                10/23/2024                 HGB                      14.4                10/23/2024                 PLT                      163                  10/23/2024            Lab Results       Component                Value               Date                       K                        3.7                 10/23/2024                 BUN                      13                  10/23/2024                 CREATININE               1.00                10/23/2024            Lab Results       Component                Value               Date                       PTT                      28                  10/22/2024             Lab Results       Component                Value               Date                       INR                      0.97                10/22/2024              Blood type     I, Sruthi Preston MD, have personally seen and evaluated the patient prior to anesthetic care.  I have reviewed the pre-anesthetic record, medical history, allergies, medications and any other medical records if appropriate to the anesthetic care.  If a CRNA is involved in the case, I have reviewed the CRNA assessment, if present, and agree. Patient consented for IV Sedation, general anesthesia as back up. Discussed risks of aspiration, IV infiltration, indications for conversion to general anesthesia. All questions and concerns addressed.     .       Plan Factors-Exercise tolerance (METS): >4 METS.    Chart reviewed. EKG reviewed. Imaging results reviewed. Existing labs reviewed. Patient summary reviewed.    Patient is not a current smoker.  Patient did not smoke on day of surgery.    Obstructive sleep apnea risk education given perioperatively.        Induction- intravenous.    Postoperative Plan-     Perioperative Resuscitation Plan - Level 1 - Full Code.       Informed Consent- Anesthetic plan and risks discussed with patient.  I personally reviewed this patient with the CRNA. Discussed and agreed on the Anesthesia Plan with the CRNA..

## 2024-10-24 ENCOUNTER — OFFICE VISIT (OUTPATIENT)
Dept: FAMILY MEDICINE CLINIC | Facility: CLINIC | Age: 49
End: 2024-10-24
Payer: COMMERCIAL

## 2024-10-24 VITALS
DIASTOLIC BLOOD PRESSURE: 80 MMHG | WEIGHT: 224 LBS | SYSTOLIC BLOOD PRESSURE: 123 MMHG | HEART RATE: 72 BPM | HEIGHT: 68 IN | OXYGEN SATURATION: 96 % | TEMPERATURE: 98.6 F | BODY MASS INDEX: 33.95 KG/M2

## 2024-10-24 DIAGNOSIS — I63.9 ACUTE EMBOLIC STROKE (HCC): Primary | ICD-10-CM

## 2024-10-24 DIAGNOSIS — R91.1 INCIDENTAL LUNG NODULE, > 3MM AND < 8MM: ICD-10-CM

## 2024-10-24 DIAGNOSIS — Q21.0 VSD (VENTRICULAR SEPTAL DEFECT): ICD-10-CM

## 2024-10-24 LAB
PROT C AG ACT/NOR PPP IA: 128 % OF NORMAL (ref 60–150)
PROT S ACT/NOR PPP: 108 % (ref 71–117)

## 2024-10-24 PROCEDURE — 99495 TRANSJ CARE MGMT MOD F2F 14D: CPT | Performed by: PHYSICIAN ASSISTANT

## 2024-10-24 NOTE — PROGRESS NOTES
"Ambulatory Visit  Name: Pedrito Gaines      : 1975      MRN: [id number]  Encounter Provider: Gilmer Phillips PA-C  Encounter Date: 10/24/2024   Encounter department: [facility]    Assessment & Plan  Acute embolic stroke (HCC)  Continue asa, statin, plavix. Normal neuro exam today. Follow up neurology. Continue workup       VSD (ventricular septal defect)  Has cardiology follow up. Event monitoring with cardiology         6 month follow up, earlier prn    History of Present Illness     Hospital course from discharge summary written by Milka Hutchison MD on 10/23/2024  \"Pedrito Gaines is a 49 y.o. male patient who originally presented to the hospital on 10/22/2024 due to right hand numbness.  Patient admitted for stroke workup.  MRI brain did confirm stroke.  Neurology consulted.  TTE performed and showed VSD as well as PFO.  Given concern for embolic stroke, neurology recommended MODESTA.  MODESTA performed today.  Please see results for full details.  Patient will be discharged on aspirin, Plavix, statin.  Outpatient follow-up with neurology, cardiology.  Advised to return with any worsening symptoms.\"    TCM 10/24/2024  Doing well, neuro sx, resolved. Compliant with ASA, statin, plavix. No chest pain, syncope. Has cardiology/neurology follow up. Will need 3 month chest CT follow up for lung nodule.       Review of Systems   Constitutional:  Negative for chills, fatigue and fever.   HENT:  Negative for congestion, ear pain, hearing loss, nosebleeds, postnasal drip, rhinorrhea, sinus pressure, sinus pain, sneezing and sore throat.    Eyes:  Negative for pain, discharge, itching and visual disturbance.   Respiratory:  Negative for cough, chest tightness, shortness of breath and wheezing.    Cardiovascular:  Negative for chest pain, palpitations and leg swelling.   Gastrointestinal:  Negative for abdominal pain, blood in stool, constipation, diarrhea, nausea and vomiting.   Genitourinary:  " Negative for frequency and urgency.   Neurological:  Negative for dizziness, tremors, syncope, facial asymmetry, speech difficulty, weakness, light-headedness, numbness and headaches.     Past Medical History:   Diagnosis Date    Environmental allergies     Migraines      Past Surgical History:   Procedure Laterality Date    CLOSED MANIPULATION SHOULDER Left     HAND SURGERY Right     fracture with screw placement    IN REPAIR NASAL VESTIBULAR STENOSIS N/A 5/21/2020    Procedure: REPAIR VESTIBULAR STENOSIS;  Surgeon: Miguel Watkins MD;  Location: [facility];  Service: ENT    IN SEPTOPLASTY/SUBMUCOUS RESECJ W/WO CARTILAGE GRF N/A 5/21/2020    Procedure: SEPTOPLASTY;  Surgeon: Miguel Watkins MD;  Location: [facility];  Service: ENT    IN SUBMUCOUS RESCJ INFERIOR TURBINATE PRTL/COMPL N/A 5/21/2020    Procedure: SUBMUCOSAL RESECTION OF TURBINATES WITH CADAVERIC RIB GRAFT;  Surgeon: Miguel Watkins MD;  Location: [facility];  Service: ENT     Family History   Problem Relation Age of Onset    Arthritis Mother     Diabetes Mother      Social History     Tobacco Use    Smoking status: Former    Smokeless tobacco: Former     Types: Chew     Quit date: 2016    Tobacco comments:     quit 2016   Vaping Use    Vaping status: Never Used   Substance and Sexual Activity    Alcohol use: Yes     Comment: occasional    Drug use: Never    Sexual activity: Not on file     Current Outpatient Medications on File Prior to Visit   Medication Sig    aspirin 81 mg chewable tablet Chew 1 tablet (81 mg total) daily    atorvastatin (LIPITOR) 40 mg tablet Take 1 tablet (40 mg total) by mouth every evening    clopidogrel (PLAVIX) 75 mg tablet Take 1 tablet (75 mg total) by mouth daily    Glutamine 500 MG CAPS Take 2 capsules by mouth daily    Multiple Vitamin (MULTIVITAMINS PO) Take 1 tablet by mouth daily     Allergies   Allergen Reactions    Hydrocodone Itching     Immunization History   Administered Date(s) Administered    Td (adult),  "adsorbed 03/31/2009    Tdap 10/30/2015     Objective     /80   Pulse 72   Temp 98.6 °F (37 °C)   Ht 5' 8\" (1.727 m)   Wt 102 kg (224 lb)   SpO2 96%   BMI 34.06 kg/m²     Physical Exam  Vitals and nursing note reviewed.   Constitutional:       General: He is not in acute distress.     Appearance: He is well-developed.   HENT:      Head: Normocephalic and atraumatic.   Eyes:      Conjunctiva/sclera: Conjunctivae normal.   Cardiovascular:      Rate and Rhythm: Normal rate and regular rhythm.      Heart sounds: No murmur heard.  Pulmonary:      Effort: Pulmonary effort is normal. No respiratory distress.      Breath sounds: Normal breath sounds. No wheezing, rhonchi or rales.   Musculoskeletal:         General: No swelling.      Cervical back: Neck supple.   Skin:     General: Skin is warm and dry.      Capillary Refill: Capillary refill takes less than 2 seconds.   Neurological:      General: No focal deficit present.      Mental Status: He is alert and oriented to person, place, and time.      Cranial Nerves: No cranial nerve deficit.      Sensory: No sensory deficit.      Motor: No weakness.      Coordination: Coordination normal.      Gait: Gait normal.         "

## 2024-10-24 NOTE — LETTER
October 24, 2024     Patient: Pedrito Gaines  YOB: 1975  Date of Visit: 10/24/2024      To Whom it May Concern:    Pedrito Gaines is under my professional care. Pedrito was seen in my office on 10/24/2024. Please excuse Pedrito 10/22-10/24/2024    If you have any questions or concerns, please don't hesitate to call.         Sincerely,          Gilmer Phillips PA-C        CC: No Recipients

## 2024-10-25 LAB
APTT SCREEN TO CONFIRM RATIO: 1.09 RATIO (ref 0–1.34)
B2 GLYCOPROT1 IGA SERPL IA-ACNC: 1.6
B2 GLYCOPROT1 IGG SERPL IA-ACNC: 11
B2 GLYCOPROT1 IGM SERPL IA-ACNC: <2.4
CARDIOLIPIN IGA SER IA-ACNC: 2.5
CARDIOLIPIN IGG SER IA-ACNC: 32
CARDIOLIPIN IGM SER IA-ACNC: 1.6
CONFIRM APTT/NORMAL: 43.8 SEC (ref 0–47.6)
LA PPP-IMP: NORMAL
PROT S ACT/NOR PPP: 115 % (ref 61–136)
PROT S PPP-ACNC: 72 % (ref 60–150)
SCREEN APTT: 35.5 SEC (ref 0–43.5)
SCREEN DRVVT: 34.4 SEC (ref 0–47)
THROMBIN TIME: 21.3 SEC (ref 0–23)

## 2024-10-31 ENCOUNTER — TELEPHONE (OUTPATIENT)
Age: 49
End: 2024-10-31

## 2024-10-31 ENCOUNTER — PATIENT MESSAGE (OUTPATIENT)
Dept: FAMILY MEDICINE CLINIC | Facility: CLINIC | Age: 49
End: 2024-10-31

## 2024-10-31 NOTE — TELEPHONE ENCOUNTER
"Pt called. He has not yet been evaluated by outpatient neurology. Pt has a HFU scheduled for 2/12/25 with Dr. Calderón. Pt stated that he presently has a heart monitor. He is supposed to note when he feels lightheaded, faint, or dizzy. Stated that at times when he looks in the distance, he feels like he has tunnel vision, and he feels \"tight\" in his vision. Face also feels tight. Denies dizziness, lightheadedness, or headaches. Since pt has not been seen by outpatient neurology, advised that he contact his PCP regarding his vision. Pt stated that he would call his PCP office. Last OV with PCP was 10/24/24.  "

## 2024-11-01 DIAGNOSIS — I63.9 ACUTE EMBOLIC STROKE (HCC): Primary | ICD-10-CM

## 2024-11-01 DIAGNOSIS — H53.9 VISUAL DISTURBANCE: ICD-10-CM

## 2024-11-01 NOTE — PATIENT COMMUNICATION
Pt called back after receiving VM. Explained to him Gilmer FUNK recommendations. Pt states he has been taking his ASA, Plavix and statin as prescribed. He is going to call and schedule the MRI once he gets off phone with RN. Pt understands that if his symptoms get worse he will go to ED.

## 2024-11-01 NOTE — TELEPHONE ENCOUNTER
This should most definitely be evaluated for a neurologic source given his recent CVA. I placed a stat MRI brain order to assess for any new areas of concern. Assure he is taking the asa and plavix with his statin.     Id recommend sooner follow up with neurology as well, I will CC the neurologist on this response to see if any sooner availability. Go to ER with any new/worsening neurologic sx.

## 2024-11-04 ENCOUNTER — HOSPITAL ENCOUNTER (OUTPATIENT)
Dept: RADIOLOGY | Facility: HOSPITAL | Age: 49
Discharge: HOME | End: 2024-11-04
Payer: COMMERCIAL

## 2024-11-04 DIAGNOSIS — I63.9 ACUTE EMBOLIC STROKE (HCC): ICD-10-CM

## 2024-11-04 DIAGNOSIS — H53.9 VISUAL DISTURBANCE: ICD-10-CM

## 2024-11-04 PROCEDURE — 70551 MRI BRAIN STEM W/O DYE: CPT

## 2024-11-12 ENCOUNTER — OFFICE VISIT (OUTPATIENT)
Age: 49
End: 2024-11-12
Payer: COMMERCIAL

## 2024-11-12 ENCOUNTER — TELEPHONE (OUTPATIENT)
Dept: CARDIOLOGY CLINIC | Facility: CLINIC | Age: 49
End: 2024-11-12

## 2024-11-12 ENCOUNTER — PREP FOR PROCEDURE (OUTPATIENT)
Age: 49
End: 2024-11-12

## 2024-11-12 VITALS
HEIGHT: 68 IN | BODY MASS INDEX: 34.59 KG/M2 | HEART RATE: 62 BPM | DIASTOLIC BLOOD PRESSURE: 83 MMHG | WEIGHT: 228.2 LBS | OXYGEN SATURATION: 97 % | SYSTOLIC BLOOD PRESSURE: 134 MMHG

## 2024-11-12 DIAGNOSIS — Q21.12 PFO (PATENT FORAMEN OVALE): Primary | ICD-10-CM

## 2024-11-12 DIAGNOSIS — I63.9 ACUTE EMBOLIC STROKE (HCC): ICD-10-CM

## 2024-11-12 PROCEDURE — 99215 OFFICE O/P EST HI 40 MIN: CPT | Performed by: INTERNAL MEDICINE

## 2024-11-12 NOTE — H&P (VIEW-ONLY)
Cardiology Consultation  Interventional Cardiology and Structural Heart Clinic      Pedrito [place]er Park  1975  [id number]  [facility]  [address]  [address]  [place] [id number] [phone]    1. PFO (patent foramen ovale)        2. Acute embolic stroke (HCC)               Discussion/Summary:    Probable PFO related CVA, RoPE 8-84% chance statistically PFO related      Plan:    1)Discussed in detail embryology of PFO, relationship to paradoxical embolism specific to CVA with >50% counseling management options including medical therapy with antiplatelet therapy (which patient is on), anticoagulation and closure.  After discussion, patient wishes to pursue PFO percutaneous closure.  Understands risks which include but not limited to stroke, heart attack, death, need for urgent open-heart or vascular surgery to repair equipment induced injury.  Device migration both early and late which may require surgical removal.  Discussed arrhythmia development such as atrial fibrillation and palpitations.  Patient also understands benefits of closure compared to medical therapy as per RESPECT and REDUCE  trials.  Also understands alternative therapy with medical therapy (antiplatelets or anticoagulants) with open closure.  Understands that PFO related stroke while high statistically given RoPE score is not 100% and suggest antiplatelet therapy thereafter lifelong.    There was a comment about a membranous VSD.  I have reviewed his echo and transesophageal echocardiogram and I do not appreciate.  Regardless, would be inconsequential at this time.  He has a chest CT to be performed in 3 months to follow-up with coincidental lung nodule.  Can do contrast chest CT to assess ventricular septum.  Again, I do not appreciate      History:     49-year-old male referred for percutaneous PFO closure.    Patient had an embolic CVA prompting a MODESTA earlier last month.    Due to result,  presents for structural heart/interventional cardiology consultation regarding PFO percutaneous closure.    He has no history of venous thromboembolism.  Specifically no DVT or pulmonary emboli in the past.    He was no antecedent long travel by car or plane.    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    States he had a brother who may have had atrial fibrillation in his late 20s and had an A-fib related stroke but not entirely sure.    He has a current Zio patch on place.                Patient Active Problem List   Diagnosis    Exercise-induced asthma    Acquired nasal deformity excluding deviated septum    Saddle nose deformity, acquired    Nasal septal deviation    Nasal turbinate hypertrophy    Nasal obstruction    Episodic tension-type headache, not intractable    Ocular migraine    Traumatic brain injury, closed (HCC)    Cervicalgia    Acute embolic stroke (HCC)    Pulmonary nodule    VSD (ventricular septal defect)    PFO (patent foramen ovale)     Past Medical History:   Diagnosis Date    Environmental allergies     Migraines      Social History     Socioeconomic History    Marital status: /Civil Union     Spouse name: Not on file    Number of children: Not on file    Years of education: Not on file    Highest education level: Not on file   Occupational History    Not on file   Tobacco Use    Smoking status: Former    Smokeless tobacco: Former     Types: Chew     Quit date: 2016    Tobacco comments:     quit 2016   Vaping Use    Vaping status: Never Used   Substance and Sexual Activity    Alcohol use: Yes     Comment: occasional    Drug use: Never    Sexual activity: Not on file   Other Topics Concern    Not on file   Social History Narrative    Consumes 2 cups of coffee per day     Social Determinants of Health     Financial Resource Strain: Not on file   Food Insecurity: No Food Insecurity (10/22/2024)    Nursing - Inadequate Food Risk Classification     Worried About Running Out  of Food in the Last Year: Not on file     Ran Out of Food in the Last Year: Not on file     Ran Out of Food in the Last Year: 1   Transportation Needs: No Transportation Needs (10/22/2024)    Nursing - Transportation Risk Classification     Lack of Transportation: Not on file     Lack of Transportation: 2   Physical Activity: Not on file   Stress: Not on file   Social Connections: Not on file   Intimate Partner Violence: Unknown (10/22/2024)    Nursing IPS     Feels Physically and Emotionally Safe: Not on file     Physically Hurt by Someone: Not on file     Humiliated or Emotionally Abused by Someone: Not on file     Physically Hurt by Someone: 2     Hurt or Threatened by Someone: 2   Housing Stability: Unknown (10/22/2024)    Nursing: Inadequate Housing Risk Classification     Has Housing: Not on file     Worried About Losing Housing: Not on file     Unable to Get Utilities: Not on file     Unable to Pay for Housing in the Last Year: 2     Has Housin      Family History   Problem Relation Age of Onset    Arthritis Mother     Diabetes Mother      Past Surgical History:   Procedure Laterality Date    CLOSED MANIPULATION SHOULDER Left     HAND SURGERY Right     fracture with screw placement    AL REPAIR NASAL VESTIBULAR STENOSIS N/A 2020    Procedure: REPAIR VESTIBULAR STENOSIS;  Surgeon: Miguel Watkins MD;  [facility];  Service: ENT    AL SEPTOPLASTY/SUBMUCOUS RESECJ W/WO CARTILAGE GRF N/A 2020    Procedure: SEPTOPLASTY;  Surgeon: Miguel Watkins MD;  [facility];  Service: ENT    AL SUBMUCOUS RESCJ INFERIOR TURBINATE PRTL/COMPL N/A 2020    Procedure: SUBMUCOSAL RESECTION OF TURBINATES WITH CADAVERIC RIB GRAFT;  Surgeon: Miguel Watkins MD;  [facility];  Service: ENT       Current Outpatient Medications:     aspirin 81 mg chewable tablet, Chew 1 tablet (81 mg total) daily, Disp: 30 tablet, Rfl: 0    atorvastatin (LIPITOR) 40 mg tablet, Take 1 tablet (40 mg total) by  "mouth every evening, Disp: 30 tablet, Rfl: 0    clopidogrel (PLAVIX) 75 mg tablet, Take 1 tablet (75 mg total) by mouth daily, Disp: 30 tablet, Rfl: 0    Glutamine 500 MG CAPS, Take 2 capsules by mouth daily, Disp: , Rfl:     Multiple Vitamin (MULTIVITAMINS PO), Take 1 tablet by mouth daily, Disp: , Rfl:   Allergies   Allergen Reactions    Hydrocodone Itching       Social, Family and medication history as listed, reviewed and updated as necessary    Labs:   Lab Results   Component Value Date    K 3.7 10/23/2024     10/23/2024    CO2 25 10/23/2024    BUN 13 10/23/2024    CREATININE 1.00 10/23/2024    CREATININE 1.02 10/22/2024    CALCIUM 9.1 10/23/2024       Lab Results   Component Value Date    WBC 5.08 10/23/2024    HGB 14.4 10/23/2024    HGB 14.8 10/22/2024    HCT 41.7 10/23/2024    HCT 44.3 10/22/2024     10/23/2024     10/22/2024       No results found for: \"CHOL\"  Lab Results   Component Value Date    HDL 53 10/23/2024    HDL 51 07/13/2023     Lab Results   Component Value Date    LDLCALC 91 10/23/2024    LDLCALC 78 07/13/2023     Lab Results   Component Value Date    TRIG 59 10/23/2024    TRIG 43 07/13/2023     No results found for: \"LDLDIRECT\"    Lab Results   Component Value Date    ALT 24 07/13/2023    ALT 29 07/26/2022    AST 20 07/13/2023    AST 21 07/26/2022    ALKPHOS 53 07/13/2023    ALKPHOS 54 07/26/2022             No results found for: \"NTBNP\"    Lab Results   Component Value Date    HGBA1C 5.1 10/23/2024       Imaging: Reviewed in epic      Review of Systems:  14 systems reviewed and negative with exception of the above       PHYSICAL EXAM:        Vitals:    11/12/24 1608   BP: 134/83   Pulse: 62   SpO2: 97%     Body mass index is 34.7 kg/m².  Weight (last 2 days)       Date/Time Weight    11/12/24 1608 104 (228.2)               Gen: No acute distress  HEENT: anicteric, mucous membranes moist  Neck: supple, no jugular venous distention, or carotid bruit  Heart: regular, normal " s1 and s2, no murmur/rub or gallop  Lungs :clear to auscultation bilaterally, no rales/rhonchi or wheeze  Abdomen: soft nontender, normoactive bowel sounds, no organomegaly  Ext: warm and perfused, normal femoral pulses, no edema, or clubbing  Skin: warm, no rashes  Neuro: AAO x 3, no focal findings  Psychiatric: normal affect  Musculoskeletal: no obvious joint deformities.        This note was completed in part utilizing direct voice recognition software.   Grammatical errors, random word insertion, spelling mistakes, and incomplete sentences may be an occasional consequence of the system secondary to software limitations, ambient noise and hardware issues. At the time of dictation, efforts were made to edit, clarify and /or correct errors.  Please read the chart carefully and recognize, using context, where substitutions have occurred.  If you have any questions or concerns about the context, text or information contained within the body of this dictation, please contact myself, the provider, for further clarification.

## 2024-11-12 NOTE — PROGRESS NOTES
Cardiology Consultation  Interventional Cardiology and Structural Heart Clinic      Pedrito Tuttle  1975  [id number]  [facility]  [address]  [address]  [place] [id number] [phone]    1. PFO (patent foramen ovale)        2. Acute embolic stroke (HCC)               Discussion/Summary:    Probable PFO related CVA, RoPE 8-84% chance statistically PFO related      Plan:    1)Discussed in detail embryology of PFO, relationship to paradoxical embolism specific to CVA with >50% counseling management options including medical therapy with antiplatelet therapy (which patient is on), anticoagulation and closure.  After discussion, patient wishes to pursue PFO percutaneous closure.  Understands risks which include but not limited to stroke, heart attack, death, need for urgent open-heart or vascular surgery to repair equipment induced injury.  Device migration both early and late which may require surgical removal.  Discussed arrhythmia development such as atrial fibrillation and palpitations.  Patient also understands benefits of closure compared to medical therapy as per RESPECT and REDUCE  trials.  Also understands alternative therapy with medical therapy (antiplatelets or anticoagulants) with open closure.  Understands that PFO related stroke while high statistically given RoPE score is not 100% and suggest antiplatelet therapy thereafter lifelong.    There was a comment about a membranous VSD.  I have reviewed his echo and transesophageal echocardiogram and I do not appreciate.  Regardless, would be inconsequential at this time.  He has a chest CT to be performed in 3 months to follow-up with coincidental lung nodule.  Can do contrast chest CT to assess ventricular septum.  Again, I do not appreciate      History:     49-year-old male referred for percutaneous PFO closure.    Patient had an embolic CVA prompting a MODESTA earlier last month.    Due to result,  presents for structural heart/interventional cardiology consultation regarding PFO percutaneous closure.    He has no history of venous thromboembolism.  Specifically no DVT or pulmonary emboli in the past.    He was no antecedent long travel by car or plane.    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    States he had a brother who may have had atrial fibrillation in his late 20s and had an A-fib related stroke but not entirely sure.    He has a current Zio patch on place.                Patient Active Problem List   Diagnosis    Exercise-induced asthma    Acquired nasal deformity excluding deviated septum    Saddle nose deformity, acquired    Nasal septal deviation    Nasal turbinate hypertrophy    Nasal obstruction    Episodic tension-type headache, not intractable    Ocular migraine    Traumatic brain injury, closed (HCC)    Cervicalgia    Acute embolic stroke (HCC)    Pulmonary nodule    VSD (ventricular septal defect)    PFO (patent foramen ovale)     Past Medical History:   Diagnosis Date    Environmental allergies     Migraines      Social History     Socioeconomic History    Marital status: /Civil Union     Spouse name: Not on file    Number of children: Not on file    Years of education: Not on file    Highest education level: Not on file   Occupational History    Not on file   Tobacco Use    Smoking status: Former    Smokeless tobacco: Former     Types: Chew     Quit date: 2016    Tobacco comments:     quit 2016   Vaping Use    Vaping status: Never Used   Substance and Sexual Activity    Alcohol use: Yes     Comment: occasional    Drug use: Never    Sexual activity: Not on file   Other Topics Concern    Not on file   Social History Narrative    Consumes 2 cups of coffee per day     Social Determinants of Health     Financial Resource Strain: Not on file   Food Insecurity: No Food Insecurity (10/22/2024)    Nursing - Inadequate Food Risk Classification     Worried About Running Out  of Food in the Last Year: Not on file     Ran Out of Food in the Last Year: Not on file     Ran Out of Food in the Last Year: 1   Transportation Needs: No Transportation Needs (10/22/2024)    Nursing - Transportation Risk Classification     Lack of Transportation: Not on file     Lack of Transportation: 2   Physical Activity: Not on file   Stress: Not on file   Social Connections: Not on file   Intimate Partner Violence: Unknown (10/22/2024)    Nursing IPS     Feels Physically and Emotionally Safe: Not on file     Physically Hurt by Someone: Not on file     Humiliated or Emotionally Abused by Someone: Not on file     Physically Hurt by Someone: 2     Hurt or Threatened by Someone: 2   Housing Stability: Unknown (10/22/2024)    Nursing: Inadequate Housing Risk Classification     Has Housing: Not on file     Worried About Losing Housing: Not on file     Unable to Get Utilities: Not on file     Unable to Pay for Housing in the Last Year: 2     Has Housin      Family History   Problem Relation Age of Onset    Arthritis Mother     Diabetes Mother      Past Surgical History:   Procedure Laterality Date    CLOSED MANIPULATION SHOULDER Left     HAND SURGERY Right     fracture with screw placement    UT REPAIR NASAL VESTIBULAR STENOSIS N/A 2020    Procedure: REPAIR VESTIBULAR STENOSIS;  Surgeon: Miguel Watkins MD;  [facility];  Service: ENT    UT SEPTOPLASTY/SUBMUCOUS RESECJ W/WO CARTILAGE GRF N/A 2020    Procedure: SEPTOPLASTY;  Surgeon: Miguel Watkins MD;  [facility];  Service: ENT    UT SUBMUCOUS RESCJ INFERIOR TURBINATE PRTL/COMPL N/A 2020    Procedure: SUBMUCOSAL RESECTION OF TURBINATES WITH CADAVERIC RIB GRAFT;  Surgeon: Miguel Watkins MD;  [facility];  Service: ENT       Current Outpatient Medications:     aspirin 81 mg chewable tablet, Chew 1 tablet (81 mg total) daily, Disp: 30 tablet, Rfl: 0    atorvastatin (LIPITOR) 40 mg tablet, Take 1 tablet (40 mg total) by  "mouth every evening, Disp: 30 tablet, Rfl: 0    clopidogrel (PLAVIX) 75 mg tablet, Take 1 tablet (75 mg total) by mouth daily, Disp: 30 tablet, Rfl: 0    Glutamine 500 MG CAPS, Take 2 capsules by mouth daily, Disp: , Rfl:     Multiple Vitamin (MULTIVITAMINS PO), Take 1 tablet by mouth daily, Disp: , Rfl:   Allergies   Allergen Reactions    Hydrocodone Itching       Social, Family and medication history as listed, reviewed and updated as necessary    Labs:   Lab Results   Component Value Date    K 3.7 10/23/2024     10/23/2024    CO2 25 10/23/2024    BUN 13 10/23/2024    CREATININE 1.00 10/23/2024    CREATININE 1.02 10/22/2024    CALCIUM 9.1 10/23/2024       Lab Results   Component Value Date    WBC 5.08 10/23/2024    HGB 14.4 10/23/2024    HGB 14.8 10/22/2024    HCT 41.7 10/23/2024    HCT 44.3 10/22/2024     10/23/2024     10/22/2024       No results found for: \"CHOL\"  Lab Results   Component Value Date    HDL 53 10/23/2024    HDL 51 07/13/2023     Lab Results   Component Value Date    LDLCALC 91 10/23/2024    LDLCALC 78 07/13/2023     Lab Results   Component Value Date    TRIG 59 10/23/2024    TRIG 43 07/13/2023     No results found for: \"LDLDIRECT\"    Lab Results   Component Value Date    ALT 24 07/13/2023    ALT 29 07/26/2022    AST 20 07/13/2023    AST 21 07/26/2022    ALKPHOS 53 07/13/2023    ALKPHOS 54 07/26/2022             No results found for: \"NTBNP\"    Lab Results   Component Value Date    HGBA1C 5.1 10/23/2024       Imaging: Reviewed in epic      Review of Systems:  14 systems reviewed and negative with exception of the above       PHYSICAL EXAM:        Vitals:    11/12/24 1608   BP: 134/83   Pulse: 62   SpO2: 97%     Body mass index is 34.7 kg/m².  Weight (last 2 days)       Date/Time Weight    11/12/24 1608 104 (228.2)               Gen: No acute distress  HEENT: anicteric, mucous membranes moist  Neck: supple, no jugular venous distention, or carotid bruit  Heart: regular, normal " s1 and s2, no murmur/rub or gallop  Lungs :clear to auscultation bilaterally, no rales/rhonchi or wheeze  Abdomen: soft nontender, normoactive bowel sounds, no organomegaly  Ext: warm and perfused, normal femoral pulses, no edema, or clubbing  Skin: warm, no rashes  Neuro: AAO x 3, no focal findings  Psychiatric: normal affect  Musculoskeletal: no obvious joint deformities.        This note was completed in part utilizing direct voice recognition software.   Grammatical errors, random word insertion, spelling mistakes, and incomplete sentences may be an occasional consequence of the system secondary to software limitations, ambient noise and hardware issues. At the time of dictation, efforts were made to edit, clarify and /or correct errors.  Please read the chart carefully and recognize, using context, where substitutions have occurred.  If you have any questions or concerns about the context, text or information contained within the body of this dictation, please contact myself, the provider, for further clarification.

## 2024-11-12 NOTE — TELEPHONE ENCOUNTER
"Patient scheduled for PFO Closure on 11/20/24 at [facility] with Dr. Barrett.      Instructions sent to patient through [email or web address].      Patient aware of all general instructions.    Medication holds:   N/A    Blood Thinner:  N/A    Labs to be done on:  N/A  (Patient did BMP / CBC on 10/23/24      Thank you,  Amanda \"Joanna\" Kimo    "

## 2024-11-20 ENCOUNTER — APPOINTMENT (OUTPATIENT)
Dept: NON INVASIVE DIAGNOSTICS | Facility: HOSPITAL | Age: 49
End: 2024-11-20
Payer: COMMERCIAL

## 2024-11-20 ENCOUNTER — HOSPITAL ENCOUNTER (OUTPATIENT)
Facility: HOSPITAL | Age: 49
Setting detail: OUTPATIENT SURGERY
Discharge: HOME | End: 2024-11-21
Attending: INTERNAL MEDICINE | Admitting: INTERNAL MEDICINE
Payer: COMMERCIAL

## 2024-11-20 ENCOUNTER — ANESTHESIA EVENT (OUTPATIENT)
Dept: NON INVASIVE DIAGNOSTICS | Facility: HOSPITAL | Age: 49
End: 2024-11-20
Payer: COMMERCIAL

## 2024-11-20 ENCOUNTER — ANESTHESIA (OUTPATIENT)
Dept: NON INVASIVE DIAGNOSTICS | Facility: HOSPITAL | Age: 49
End: 2024-11-20
Payer: COMMERCIAL

## 2024-11-20 DIAGNOSIS — R29.90 STROKE-LIKE SYMPTOMS: ICD-10-CM

## 2024-11-20 DIAGNOSIS — Z87.74 S/P PATENT FORAMEN OVALE CLOSURE: Primary | ICD-10-CM

## 2024-11-20 DIAGNOSIS — Q21.12 PFO (PATENT FORAMEN OVALE): ICD-10-CM

## 2024-11-20 LAB
ATRIAL RATE: 61 BPM
ATRIAL RATE: 70 BPM
P AXIS: 56 DEGREES
P AXIS: 62 DEGREES
PR INTERVAL: 164 MS
PR INTERVAL: 164 MS
QRS AXIS: 43 DEGREES
QRS AXIS: 44 DEGREES
QRSD INTERVAL: 90 MS
QRSD INTERVAL: 90 MS
QT INTERVAL: 344 MS
QT INTERVAL: 386 MS
QTC INTERVAL: 372 MS
QTC INTERVAL: 389 MS
T WAVE AXIS: 29 DEGREES
T WAVE AXIS: 34 DEGREES
VENTRICULAR RATE: 61 BPM
VENTRICULAR RATE: 70 BPM

## 2024-11-20 PROCEDURE — 93005 ELECTROCARDIOGRAM TRACING: CPT

## 2024-11-20 PROCEDURE — C1894 INTRO/SHEATH, NON-LASER: HCPCS | Performed by: INTERNAL MEDICINE

## 2024-11-20 PROCEDURE — C1760 CLOSURE DEV, VASC: HCPCS | Performed by: INTERNAL MEDICINE

## 2024-11-20 PROCEDURE — C1769 GUIDE WIRE: HCPCS | Performed by: INTERNAL MEDICINE

## 2024-11-20 PROCEDURE — 93010 ELECTROCARDIOGRAM REPORT: CPT | Performed by: INTERNAL MEDICINE

## 2024-11-20 PROCEDURE — 93662 INTRACARDIAC ECG (ICE): CPT | Performed by: INTERNAL MEDICINE

## 2024-11-20 PROCEDURE — 93580 TRANSCATH CLOSURE OF ASD: CPT | Performed by: INTERNAL MEDICINE

## 2024-11-20 PROCEDURE — C1817 SEPTAL DEFECT IMP SYS: HCPCS | Performed by: INTERNAL MEDICINE

## 2024-11-20 PROCEDURE — C1759 CATH, INTRA ECHOCARDIOGRAPHY: HCPCS | Performed by: INTERNAL MEDICINE

## 2024-11-20 PROCEDURE — 85347 COAGULATION TIME ACTIVATED: CPT

## 2024-11-20 DEVICE — PERCLOSE™ PROSTYLE™ SUTURE-MEDIATED CLOSURE AND REPAIR SYSTEM
Type: IMPLANTABLE DEVICE | Site: HEART | Status: FUNCTIONAL
Brand: PERCLOSE™ PROSTYLE™

## 2024-11-20 DEVICE — PFO OCCLUDER
Type: IMPLANTABLE DEVICE | Site: HEART | Status: FUNCTIONAL
Brand: AMPLATZER™ TALISMAN™

## 2024-11-20 RX ORDER — SODIUM CHLORIDE 9 MG/ML
125 INJECTION, SOLUTION INTRAVENOUS CONTINUOUS
Status: DISCONTINUED | OUTPATIENT
Start: 2024-11-20 | End: 2024-11-20

## 2024-11-20 RX ORDER — ATORVASTATIN CALCIUM 40 MG/1
40 TABLET, FILM COATED ORAL EVERY EVENING
Status: DISCONTINUED | OUTPATIENT
Start: 2024-11-20 | End: 2024-11-21 | Stop reason: HOSPADM

## 2024-11-20 RX ORDER — CLOPIDOGREL BISULFATE 75 MG/1
75 TABLET ORAL DAILY
Status: DISCONTINUED | OUTPATIENT
Start: 2024-11-20 | End: 2024-11-20

## 2024-11-20 RX ORDER — PROTAMINE SULFATE 10 MG/ML
INJECTION, SOLUTION INTRAVENOUS CODE/TRAUMA/SEDATION MEDICATION
Status: DISCONTINUED | OUTPATIENT
Start: 2024-11-20 | End: 2024-11-20 | Stop reason: HOSPADM

## 2024-11-20 RX ORDER — PROTAMINE SULFATE 10 MG/ML
INJECTION, SOLUTION INTRAVENOUS AS NEEDED
Status: DISCONTINUED | OUTPATIENT
Start: 2024-11-20 | End: 2024-11-20

## 2024-11-20 RX ORDER — CLOPIDOGREL BISULFATE 75 MG/1
75 TABLET ORAL DAILY
Status: DISCONTINUED | OUTPATIENT
Start: 2024-11-21 | End: 2024-11-21 | Stop reason: HOSPADM

## 2024-11-20 RX ORDER — ACETAMINOPHEN 325 MG/1
650 TABLET ORAL EVERY 4 HOURS PRN
Status: DISCONTINUED | OUTPATIENT
Start: 2024-11-20 | End: 2024-11-21 | Stop reason: HOSPADM

## 2024-11-20 RX ORDER — ONDANSETRON 2 MG/ML
4 INJECTION INTRAMUSCULAR; INTRAVENOUS EVERY 6 HOURS PRN
Status: DISCONTINUED | OUTPATIENT
Start: 2024-11-20 | End: 2024-11-21 | Stop reason: HOSPADM

## 2024-11-20 RX ORDER — LIDOCAINE HYDROCHLORIDE 10 MG/ML
INJECTION, SOLUTION EPIDURAL; INFILTRATION; INTRACAUDAL; PERINEURAL CODE/TRAUMA/SEDATION MEDICATION
Status: DISCONTINUED | OUTPATIENT
Start: 2024-11-20 | End: 2024-11-20 | Stop reason: HOSPADM

## 2024-11-20 RX ORDER — LIDOCAINE HYDROCHLORIDE AND EPINEPHRINE 10; 10 MG/ML; UG/ML
INJECTION, SOLUTION INFILTRATION; PERINEURAL
Status: DISPENSED
Start: 2024-11-20 | End: 2024-11-21

## 2024-11-20 RX ORDER — PHENYLEPHRINE HCL IN 0.9% NACL 1 MG/10 ML
SYRINGE (ML) INTRAVENOUS AS NEEDED
Status: DISCONTINUED | OUTPATIENT
Start: 2024-11-20 | End: 2024-11-20

## 2024-11-20 RX ORDER — ASPIRIN 81 MG/1
81 TABLET, CHEWABLE ORAL DAILY
Status: DISCONTINUED | OUTPATIENT
Start: 2024-11-20 | End: 2024-11-20

## 2024-11-20 RX ORDER — FENTANYL CITRATE 50 UG/ML
INJECTION, SOLUTION INTRAMUSCULAR; INTRAVENOUS AS NEEDED
Status: DISCONTINUED | OUTPATIENT
Start: 2024-11-20 | End: 2024-11-20

## 2024-11-20 RX ORDER — PROPOFOL 10 MG/ML
INJECTION, EMULSION INTRAVENOUS CONTINUOUS PRN
Status: DISCONTINUED | OUTPATIENT
Start: 2024-11-20 | End: 2024-11-20

## 2024-11-20 RX ORDER — HEPARIN SODIUM 1000 [USP'U]/ML
INJECTION, SOLUTION INTRAVENOUS; SUBCUTANEOUS CODE/TRAUMA/SEDATION MEDICATION
Status: DISCONTINUED | OUTPATIENT
Start: 2024-11-20 | End: 2024-11-20 | Stop reason: HOSPADM

## 2024-11-20 RX ORDER — FENTANYL CITRATE 50 UG/ML
50 INJECTION, SOLUTION INTRAMUSCULAR; INTRAVENOUS EVERY 2 HOUR PRN
Refills: 0 | Status: DISCONTINUED | OUTPATIENT
Start: 2024-11-20 | End: 2024-11-21 | Stop reason: HOSPADM

## 2024-11-20 RX ORDER — MIDAZOLAM HYDROCHLORIDE 2 MG/2ML
INJECTION, SOLUTION INTRAMUSCULAR; INTRAVENOUS AS NEEDED
Status: DISCONTINUED | OUTPATIENT
Start: 2024-11-20 | End: 2024-11-20

## 2024-11-20 RX ORDER — OXYCODONE AND ACETAMINOPHEN 5; 325 MG/1; MG/1
2 TABLET ORAL EVERY 4 HOURS PRN
Status: DISCONTINUED | OUTPATIENT
Start: 2024-11-20 | End: 2024-11-21 | Stop reason: HOSPADM

## 2024-11-20 RX ORDER — CEFAZOLIN SODIUM 2 G/50ML
2000 SOLUTION INTRAVENOUS ONCE
Status: COMPLETED | OUTPATIENT
Start: 2024-11-20 | End: 2024-11-20

## 2024-11-20 RX ORDER — SODIUM CHLORIDE 9 MG/ML
125 INJECTION, SOLUTION INTRAVENOUS CONTINUOUS
Status: DISPENSED | OUTPATIENT
Start: 2024-11-20 | End: 2024-11-20

## 2024-11-20 RX ORDER — ASPIRIN 81 MG/1
81 TABLET, CHEWABLE ORAL DAILY
Status: DISCONTINUED | OUTPATIENT
Start: 2024-11-21 | End: 2024-11-21 | Stop reason: HOSPADM

## 2024-11-20 RX ORDER — PROPOFOL 10 MG/ML
INJECTION, EMULSION INTRAVENOUS AS NEEDED
Status: DISCONTINUED | OUTPATIENT
Start: 2024-11-20 | End: 2024-11-20

## 2024-11-20 RX ADMIN — SODIUM CHLORIDE: 0.9 INJECTION, SOLUTION INTRAVENOUS at 10:50

## 2024-11-20 RX ADMIN — Medication 100 MCG: at 11:44

## 2024-11-20 RX ADMIN — PROPOFOL 25 MG: 10 INJECTION, EMULSION INTRAVENOUS at 11:26

## 2024-11-20 RX ADMIN — ATORVASTATIN CALCIUM 40 MG: 40 TABLET, FILM COATED ORAL at 17:44

## 2024-11-20 RX ADMIN — MIDAZOLAM 2 MG: 1 INJECTION INTRAMUSCULAR; INTRAVENOUS at 11:24

## 2024-11-20 RX ADMIN — PROPOFOL 100 MCG/KG/MIN: 10 INJECTION, EMULSION INTRAVENOUS at 11:26

## 2024-11-20 RX ADMIN — FENTANYL CITRATE 50 MCG: 50 INJECTION INTRAMUSCULAR; INTRAVENOUS at 12:03

## 2024-11-20 RX ADMIN — PROTAMINE SULFATE 50 MG: 10 INJECTION, SOLUTION INTRAVENOUS at 12:02

## 2024-11-20 RX ADMIN — ACETAMINOPHEN 650 MG: 325 TABLET, FILM COATED ORAL at 18:38

## 2024-11-20 RX ADMIN — OXYCODONE HYDROCHLORIDE AND ACETAMINOPHEN 2 TABLET: 5; 325 TABLET ORAL at 13:28

## 2024-11-20 RX ADMIN — FENTANYL CITRATE 50 MCG: 50 INJECTION INTRAMUSCULAR; INTRAVENOUS at 14:14

## 2024-11-20 RX ADMIN — FENTANYL CITRATE 50 MCG: 50 INJECTION INTRAMUSCULAR; INTRAVENOUS at 11:26

## 2024-11-20 RX ADMIN — CEFAZOLIN SODIUM 2000 MG: 2 SOLUTION INTRAVENOUS at 11:26

## 2024-11-20 NOTE — ANESTHESIA POSTPROCEDURE EVALUATION
Post-Op Assessment Note    CV Status:  Stable    Pain management: adequate       Mental Status:  Awake and sleepy   Hydration Status:  Euvolemic   PONV Controlled:  Controlled   Airway Patency:  Patent     Post Op Vitals Reviewed: Yes    No anethesia notable event occurred.    Staff: CRNA           Last Filed PACU Vitals:  Vitals Value Taken Time   Temp     Pulse 83 11/20/24 1210   /62 11/20/24 1210   Resp 16 11/20/24 1210   SpO2 99 % 11/20/24 1210       Modified Ritesh:  Activity: 2 (11/20/2024 12:06 PM)  Respiration: 2 (11/20/2024 12:06 PM)  Circulation: 2 (11/20/2024 12:06 PM)  Consciousness: 1 (11/20/2024 12:06 PM)  Oxygen Saturation: 2 (11/20/2024 12:06 PM)  Modified Ritesh Score: 9 (11/20/2024 12:06 PM)

## 2024-11-20 NOTE — LETTER
[facility]  [address]  [place] [address]  Dept: [phone]    November 21, 2024     Patient: Pedrito Gaines   YOB: 1975   Date of Visit: 11/20/2024       To Whom it May Concern:    Pedrito Gaines is under my professional care. He was seen in the hospital from 11/20/2024 to 11/21/24. He may return to work on 11/23/2024 without limitations.    If you have any questions or concerns, please don't hesitate to call.         Sincerely,          LUIS Pan

## 2024-11-20 NOTE — QUICK NOTE
Called to patient bedside regarding continual ooze over right groin.  Safeguard removed and continual ooze noted over upper site.  Called by nursing staff for continual right groin ooze.  Manual pressure had been held 20 minutes 1 times.    Right groin injected with 20 ml of Lidocaine 1% with 100,000 units epinephrine.  Manual pressure held and then pressure dressing applied.  No hematoma, or ecchyomsis.  Bedrest extended for 2 hours.

## 2024-11-20 NOTE — ANESTHESIA PREPROCEDURE EVALUATION
Procedure:  Cardiac pfo closure (Chest)    Relevant Problems   CARDIO   (+) Ocular migraine      NEURO/PSYCH   (+) Acute embolic stroke (HCC)   (+) Episodic tension-type headache, not intractable   (+) Ocular migraine      PULMONARY   (+) Exercise-induced asthma      Normal biventricular systolic function, possible small apical muscular VSD, PFO    Cr 1.00, hgb 14.4, plt 163    Prior grade 1 view MAC3  Physical Exam    Airway    Mallampati score: II  TM Distance: >3 FB       Dental       Cardiovascular      Pulmonary      Other Findings        Anesthesia Plan  ASA Score- 3     Anesthesia Type- IV sedation with anesthesia with ASA Monitors.         Additional Monitors:     Airway Plan:     Comment: IV sedation, GA back up; standard ASA monitors. Risks and benefits discussed with patient; patient consented and agrees to proceed.    I saw and evaluated the patient. If seen with CRNA, we have discussed the anesthetic plan and I am in agreement that the plan is appropriate for the patient.  .       Plan Factors-    Chart reviewed.   Existing labs reviewed.                   Induction- intravenous.    Postoperative Plan-     Perioperative Resuscitation Plan - Level 1 - Full Code.       Informed Consent- Anesthetic plan and risks discussed with patient.  I personally reviewed this patient with the CRNA. Discussed and agreed on the Anesthesia Plan with the CRNA..

## 2024-11-20 NOTE — DISCHARGE SUMMARY
"Discharge Summary - Pedrito Gaines 49 y.o. male MRN: [id number]    Unit/Bed#: BE CATH LAB ROOM Encounter: [id number]    Admission Date: 11/20/2024   Discharge Date: 11/21/2024    Disposition: Home    Condition at Discharge: good     PCP: Devin Zheng MD      OP Cardiologist: Dr. Arellano     Interventional cardiologist: Dr. Barrett    Admitting Diagnosis:  Patent foramen ovale   -Rope score 8  -Echocardiogram showing PFO at rest with predominant right to left shunting using saline    Secondary Diagnoses:   CVA  -Left superior parietal lobe infarct  -Continues to experience double vision  Ocular migraine    Discharge Diagnosis: Status post percutaneous closure of PFO with Amplatzer occluder device 25 mm            /74   Pulse 62   Temp 98.4 °F (36.9 °C) (Temporal)   Resp 16   Ht 5' 8\" (1.727 m)   Wt 99.3 kg (219 lb)   SpO2 97%   BMI 33.30 kg/m²       Review of Systems   Constitutional: Negative.    HENT: Negative.     Eyes:         Double vision   Respiratory: Negative.  Negative for chest tightness.    Cardiovascular: Negative.    Gastrointestinal: Negative.  Negative for abdominal pain.   Genitourinary: Negative.    Musculoskeletal: Negative.    Skin: Negative.    Allergic/Immunologic: Negative.    Neurological: Negative.    Hematological: Negative.    Psychiatric/Behavioral: Negative.         Physical Exam  Constitutional:       General: He is not in acute distress.     Appearance: Normal appearance. He is not ill-appearing.   HENT:      Head: Normocephalic.      Nose: Nose normal.      Mouth/Throat:      Mouth: Mucous membranes are moist.   Eyes:      Conjunctiva/sclera: Conjunctivae normal.   Cardiovascular:      Rate and Rhythm: Normal rate and regular rhythm.      Pulses: Normal pulses.      Heart sounds: Normal heart sounds. No murmur heard.     No friction rub. No gallop.   Pulmonary:      Effort: Pulmonary effort is normal.      Breath sounds: No wheezing, rhonchi or rales.   Abdominal:      " General: Bowel sounds are normal. There is no distension.      Palpations: Abdomen is soft.   Musculoskeletal:      Cervical back: Neck supple.      Right lower leg: No edema.      Left lower leg: No edema.   Skin:     General: Skin is warm.   Neurological:      Mental Status: He is alert and oriented to person, place, and time.   Psychiatric:         Mood and Affect: Mood normal.         Behavior: Behavior normal.         Thought Content: Thought content normal.         Judgment: Judgment normal.             HPI and Hospital Course: Pedrito Gaines, a 49-year-old male, presents to [facility] for elective percutaneous closure of patent foramen ovale.  He presented to [facility] on 10/23/2024 with complaints of right upper extremity numbness and weakness in addition to right-sided facial weakness and blurry vision.  CT angiogram performed at that time showed no large acute vascular infarct, hemorrhage or mass effect.  MRI of the brain showing left superior parietal lobe infarct [facility] performed on 10/23/2024 showed: Left ventricular ejection fraction 60%.  Wall motion normal.  Possible small apical muscular VSD with left-to-right shunting noted.  Patent foramen ovale with right-to-left shunting with rest noted.  There is a septal aneurysm.  Hypercoagulable panel reported normal.  Rope score 8.       Past medical history is significant for PFO, pulmonary nodule (7 mm right upper lobe-repeat CT in 3 months) and ocular migraines.  Remote history of smoking quit in 2016.  No history of alcohol abuse or illicit drug abuse.    Prior to percutaneous PFO closure patient was given aspirin 81 mg and Plavix 75 mg.  Right femoral venous access was obtained.  Closure of PFO was achieved using both fluroscopy and ICE catheter for successful placement of Amplatzer Occluder closure device (25 mm).  There is no residual shunt. Bilateral venous access sites were closed using Proglide devices.   Hemostasis was achieved .  He  "tolerated the procedure well.   During postoperative period he experienced oozing over right femoral site requiring injection with lidocaine/epinephrine with successful hemostasis.    Echocardiogram showing Amplatzer occluder device in appropriate position.  No pericardial effusion.  Images reviewed with Dr. Barrett.  Discharge labs within expected values.       Discharge plan  Dual antiplatelet therapy with aspirin 81 mg daily and Plavix 75 mg daily for 30 days.  Then will require aspirin 81 mg indefinitely  Follow-up echocardiogram is scheduled for 12/18/2024 at 2 PM at [facility]Department of Cardiology  Follow-up appointment with Dr. Barrett is scheduled for 12/18/2024 at 4 PM  He will require antibiotic prophylaxis for any dental procedure or invasive procedures for the next 6 months.    Current Facility-Administered Medications:     acetaminophen (TYLENOL) tablet 650 mg, Q4H PRN    [START ON 11/21/2024] aspirin chewable tablet 81 mg, Daily    atorvastatin (LIPITOR) tablet 40 mg, QPM    [START ON 11/21/2024] clopidogrel (PLAVIX) tablet 75 mg, Daily    [START ON 11/21/2024] multivitamin stress formula tablet 1 tablet, Daily    ondansetron (ZOFRAN) injection 4 mg, Q6H PRN    oxyCODONE-acetaminophen (PERCOCET) 5-325 mg per tablet 2 tablet, Q4H PRN    sodium chloride 0.9 % infusion, Continuous    Pertinent Labs/diagnostics:        Lab Ressults:  No results found for this or any previous visit (from the past 24 hours).        Lipid Profile:   No results found for: \"CHOL\"  Lab Results   Component Value Date    HDL 53 10/23/2024    HDL 51 07/13/2023    HDL 48 12/07/2019     Lab Results   Component Value Date    LDLCALC 91 10/23/2024    LDLCALC 78 07/13/2023    LDLCALC 96 12/07/2019     Lab Results   Component Value Date    TRIG 59 10/23/2024    TRIG 43 07/13/2023    TRIG 51 12/07/2019         Cardiac testing:   No results found for this or any previous visit.    No results found for this or any previous " visit.    No valid procedures specified.  No results found for this or any previous visit.      Tele: NSR no ectopy       Discharge instructions/Information to patient and family:   See after visit summary for information provided to patient and family.      Provisions for Follow-Up Care:  See after visit summary for information related to follow-up care and any pertinent home health orders.      Planned Readmission: No    Discharge Statement:  I spent 45 minutes minutes discharging the patient. This time was spent on the day of discharge. I had direct contact with the patient on the day of discharge. Additional documentation is required if more than 30 minutes were spent on discharge.         ** Please Note: Fluency Direct Dictation voice to text software may have been used in the creation of this document. **

## 2024-11-20 NOTE — INTERVAL H&P NOTE
"Update: (This section must be completed if the H&P was completed greater than 24 hrs to procedure or admission)    H&P reviewed. After examining the patient, I find no changed to the H&P since it had been written.    Pedrito Gaines, a 49-year-old male, presents to [facility] for percutaneous closure of PFO.      /79   Pulse 75   Temp 98.4 °F (36.9 °C) (Temporal)   Resp 14   Ht 5' 8\" (1.727 m)   Wt 99.3 kg (219 lb)   SpO2 97%   BMI 33.30 kg/m²       Patient re-evaluated. Accept as history and physical.    LUIS Pan/November 20, 2024/11:17 AM  "

## 2024-11-20 NOTE — INTERVAL H&P NOTE
"Update: (This section must be completed if the H&P was completed greater than 24 hrs to procedure or admission)    H&P reviewed. After examining the patient, I find no changed to the H&P since it had been written.  /79   Pulse 75   Temp 98.4 °F (36.9 °C) (Temporal)   Resp 14   Ht 5' 8\" (1.727 m)   Wt 99.3 kg (219 lb)   SpO2 97%   BMI 33.30 kg/m²     Patient presents today for percutaneous PFO closure for secondary PFO related CVA prevention.  Risk benefits alternative therapies explained in detail.  Wishes to proceed.    Patient relates a episode beginning of the week where he felt the right side of his body go numb.  Similar to prior CVA but not with weakness.  He did make his way to the emergency department but took an aspirin and Plavix and stated slowly went away.  He decided not to going to emergency department and symptoms resolved.    Patient re-evaluated. Accept as history and physical.    Alan Barrett, DO/November 20, 2024/11:08 AM  "

## 2024-11-20 NOTE — ANESTHESIA POSTPROCEDURE EVALUATION
Post-Op Assessment Note            No anethesia notable event occurred.    Comments: uneventful post op course        Last Filed PACU Vitals:  Vitals Value Taken Time   Temp     Pulse 62 11/20/24 1248   /63 11/20/24 1245   Resp 16 11/20/24 1210   SpO2 95 % 11/20/24 1248   Vitals shown include unfiled device data.    Modified Ritesh:  Activity: 2 (11/20/2024 12:06 PM)  Respiration: 2 (11/20/2024 12:06 PM)  Circulation: 2 (11/20/2024 12:06 PM)  Consciousness: 1 (11/20/2024 12:06 PM)  Oxygen Saturation: 2 (11/20/2024 12:06 PM)  Modified Ritesh Score: 9 (11/20/2024 12:06 PM)

## 2024-11-21 ENCOUNTER — APPOINTMENT (OUTPATIENT)
Dept: NON INVASIVE DIAGNOSTICS | Facility: HOSPITAL | Age: 49
End: 2024-11-21
Payer: COMMERCIAL

## 2024-11-21 VITALS
HEIGHT: 68 IN | BODY MASS INDEX: 33.19 KG/M2 | SYSTOLIC BLOOD PRESSURE: 117 MMHG | HEART RATE: 72 BPM | DIASTOLIC BLOOD PRESSURE: 69 MMHG | TEMPERATURE: 98.4 F | OXYGEN SATURATION: 97 % | WEIGHT: 219 LBS | RESPIRATION RATE: 18 BRPM

## 2024-11-21 LAB
ANION GAP SERPL CALCULATED.3IONS-SCNC: 8 MMOL/L (ref 4–13)
BSA FOR ECHO PROCEDURE: 2.12 M2
BUN SERPL-MCNC: 18 MG/DL (ref 5–25)
CALCIUM SERPL-MCNC: 8.2 MG/DL (ref 8.4–10.2)
CHLORIDE SERPL-SCNC: 105 MMOL/L (ref 96–108)
CO2 SERPL-SCNC: 26 MMOL/L (ref 21–32)
CREAT SERPL-MCNC: 0.89 MG/DL (ref 0.6–1.3)
E WAVE DECELERATION TIME: 161 MS
E/A RATIO: 1.35
ERYTHROCYTE [DISTWIDTH] IN BLOOD BY AUTOMATED COUNT: 12.8 % (ref 11.6–15.1)
GFR SERPL CREATININE-BSD FRML MDRD: 101 ML/MIN/1.73SQ M
GLUCOSE P FAST SERPL-MCNC: 90 MG/DL (ref 65–99)
GLUCOSE SERPL-MCNC: 90 MG/DL (ref 65–140)
HCT VFR BLD AUTO: 40.2 % (ref 36.5–49.3)
HGB BLD-MCNC: 13.4 G/DL (ref 12–17)
KCT BLD-ACNC: 314 SEC (ref 89–137)
MCH RBC QN AUTO: 29.5 PG (ref 26.8–34.3)
MCHC RBC AUTO-ENTMCNC: 33.3 G/DL (ref 31.4–37.4)
MCV RBC AUTO: 88 FL (ref 82–98)
MV E'TISSUE VEL-SEP: 13 CM/S
MV PEAK A VEL: 0.48 M/S
MV PEAK E VEL: 65 CM/S
MV STENOSIS PRESSURE HALF TIME: 47 MS
MV VALVE AREA P 1/2 METHOD: 4.68
PLATELET # BLD AUTO: 144 THOUSANDS/UL (ref 149–390)
PMV BLD AUTO: 10 FL (ref 8.9–12.7)
POTASSIUM SERPL-SCNC: 3.9 MMOL/L (ref 3.5–5.3)
RBC # BLD AUTO: 4.55 MILLION/UL (ref 3.88–5.62)
SL CV LV EF: 57
SODIUM SERPL-SCNC: 139 MMOL/L (ref 135–147)
SPECIMEN SOURCE: ABNORMAL
WBC # BLD AUTO: 4.76 THOUSAND/UL (ref 4.31–10.16)

## 2024-11-21 PROCEDURE — 80048 BASIC METABOLIC PNL TOTAL CA: CPT

## 2024-11-21 PROCEDURE — NC001 PR NO CHARGE

## 2024-11-21 PROCEDURE — 93325 DOPPLER ECHO COLOR FLOW MAPG: CPT

## 2024-11-21 PROCEDURE — 93325 DOPPLER ECHO COLOR FLOW MAPG: CPT | Performed by: INTERNAL MEDICINE

## 2024-11-21 PROCEDURE — 93308 TTE F-UP OR LMTD: CPT | Performed by: INTERNAL MEDICINE

## 2024-11-21 PROCEDURE — 93321 DOPPLER ECHO F-UP/LMTD STD: CPT

## 2024-11-21 PROCEDURE — 93308 TTE F-UP OR LMTD: CPT

## 2024-11-21 PROCEDURE — 85027 COMPLETE CBC AUTOMATED: CPT

## 2024-11-21 PROCEDURE — 93321 DOPPLER ECHO F-UP/LMTD STD: CPT | Performed by: INTERNAL MEDICINE

## 2024-11-21 RX ORDER — ATORVASTATIN CALCIUM 40 MG/1
40 TABLET, FILM COATED ORAL EVERY EVENING
Qty: 30 TABLET | Refills: 11 | Status: SHIPPED | OUTPATIENT
Start: 2024-11-21

## 2024-11-21 RX ORDER — CLOPIDOGREL BISULFATE 75 MG/1
75 TABLET ORAL DAILY
Qty: 30 TABLET | Refills: 2 | Status: SHIPPED | OUTPATIENT
Start: 2024-11-21

## 2024-11-21 RX ADMIN — ACETAMINOPHEN 650 MG: 325 TABLET, FILM COATED ORAL at 01:15

## 2024-11-21 RX ADMIN — CLOPIDOGREL BISULFATE 75 MG: 75 TABLET ORAL at 09:15

## 2024-11-21 RX ADMIN — ASPIRIN 81 MG CHEWABLE TABLET 81 MG: 81 TABLET CHEWABLE at 09:15

## 2024-11-21 RX ADMIN — B-COMPLEX W/ C & FOLIC ACID TAB 1 TABLET: TAB at 09:15

## 2024-11-21 NOTE — PLAN OF CARE
Problem: PAIN - ADULT  Goal: Verbalizes/displays adequate comfort level or baseline comfort level  Description: Interventions:  - Encourage patient to monitor pain and request assistance  - Assess pain using appropriate pain scale  - Administer analgesics based on type and severity of pain and evaluate response  - Implement non-pharmacological measures as appropriate and evaluate response  - Consider cultural and social influences on pain and pain management  - Notify physician/advanced practitioner if interventions unsuccessful or patient reports new pain  Outcome: Progressing     Problem: INFECTION - ADULT  Goal: Absence or prevention of progression during hospitalization  Description: INTERVENTIONS:  - Assess and monitor for signs and symptoms of infection  - Monitor lab/diagnostic results  - Monitor all insertion sites, i.e. indwelling lines, tubes, and drains  - Monitor endotracheal if appropriate and nasal secretions for changes in amount and color  - [place] appropriate cooling/warming therapies per order  - Administer medications as ordered  - Instruct and encourage patient and family to use good hand hygiene technique  - Identify and instruct in appropriate isolation precautions for identified infection/condition  Outcome: Progressing     Problem: SAFETY ADULT  Goal: Patient will remain free of falls  Description: INTERVENTIONS:  - Educate patient/family on patient safety including physical limitations  - Instruct patient to call for assistance with activity   - Consult OT/PT to assist with strengthening/mobility   - Keep Call bell within reach  - Keep bed low and locked with side rails adjusted as appropriate  - Keep care items and personal belongings within reach  - Initiate and maintain comfort rounds  - Make Fall Risk Sign visible to staff  - Offer Toileting every  Hours, in advance of need  - Initiate/Maintain alarm  - Obtain necessary fall risk management equipment:   - Apply yellow socks and  bracelet for high fall risk patients  - Consider moving patient to room near nurses station  Outcome: Progressing     Problem: DISCHARGE PLANNING  Goal: Discharge to home or other facility with appropriate resources  Description: INTERVENTIONS:  - Identify barriers to discharge w/patient and caregiver  - Arrange for needed discharge resources and transportation as appropriate  - Identify discharge learning needs (meds, wound care, etc.)  - Arrange for interpretive services to assist at discharge as needed  - Refer to Case Management Department for coordinating discharge planning if the patient needs post-hospital services based on physician/advanced practitioner order or complex needs related to functional status, cognitive ability, or social support system  Outcome: Progressing

## 2024-11-21 NOTE — DISCHARGE INSTR - AVS FIRST PAGE
1. Please see the post procedure dishcarge instructions. PFO booklet    2. No heavy lifting, greater than 10 lbs. or strenuous activity for 1 week.    3. Remove band aid tomorrow.  Shower and wash groins area gently with soap and water- beginning tomorrow. Rinse and pat dry.  Apply new water seal band aid.  Repeat this process for 5 days. No powders, creams lotions or antibiotic ointments for 5 days.  No tub baths, hot tubs or swimming for 5 days.     4. Please call our office ([phone]) if you have any fever, redness, swelling, discharge from your groin access site.    5. No driving for 1day.    6. Perclose Booklet    7 Echocardiogram is scheduled for 12/18/2024 at 2 PM at [facility] Department of Cardiology.  Following echocardiogram you have an appointment with Dr. Barrett on 12/18/2024 at 4 PM.    8. Please check with cardiologist prior to any interventions/procedures/ dental work for the next 6 months.  You will need to be on antibiotics.

## 2024-11-21 NOTE — PLAN OF CARE
Problem: PAIN - ADULT  Goal: Verbalizes/displays adequate comfort level or baseline comfort level  Description: Interventions:  - Encourage patient to monitor pain and request assistance  - Assess pain using appropriate pain scale  - Administer analgesics based on type and severity of pain and evaluate response  - Implement non-pharmacological measures as appropriate and evaluate response  - Consider cultural and social influences on pain and pain management  - Notify physician/advanced practitioner if interventions unsuccessful or patient reports new pain  Outcome: Progressing     Problem: INFECTION - ADULT  Goal: Absence or prevention of progression during hospitalization  Description: INTERVENTIONS:  - Assess and monitor for signs and symptoms of infection  - Monitor lab/diagnostic results  - Monitor all insertion sites, i.e. indwelling lines, tubes, and drains  - Monitor endotracheal if appropriate and nasal secretions for changes in amount and color  - [place] appropriate cooling/warming therapies per order  - Administer medications as ordered  - Instruct and encourage patient and family to use good hand hygiene technique  - Identify and instruct in appropriate isolation precautions for identified infection/condition  Outcome: Progressing  Goal: Absence of fever/infection during neutropenic period  Description: INTERVENTIONS:  - Monitor WBC    Outcome: Progressing     Problem: SAFETY ADULT  Goal: Patient will remain free of falls  Description: INTERVENTIONS:  - Educate patient/family on patient safety including physical limitations  - Instruct patient to call for assistance with activity   - Consult OT/PT to assist with strengthening/mobility   - Keep Call bell within reach  - Keep bed low and locked with side rails adjusted as appropriate  - Keep care items and personal belongings within reach  - Initiate and maintain comfort rounds  - Make Fall Risk Sign visible to staff  - Offer Toileting every 2 Hours,  in advance of need  - Initiate/Maintain alarm  - Obtain necessary fall risk management equipment:   - Apply yellow socks and bracelet for high fall risk patients  - Consider moving patient to room near nurses station  Outcome: Progressing  Goal: Maintain or return to baseline ADL function  Description: INTERVENTIONS:  -  Assess patient's ability to carry out ADLs; assess patient's baseline for ADL function and identify physical deficits which impact ability to perform ADLs (bathing, care of mouth/teeth, toileting, grooming, dressing, etc.)  - Assess/evaluate cause of self-care deficits   - Assess range of motion  - Assess patient's mobility; develop plan if impaired  - Assess patient's need for assistive devices and provide as appropriate  - Encourage maximum independence but intervene and supervise when necessary  - Involve family in performance of ADLs  - Assess for home care needs following discharge   - Consider OT consult to assist with ADL evaluation and planning for discharge  - Provide patient education as appropriate  Outcome: Progressing  Goal: Maintains/Returns to pre admission functional level  Description: INTERVENTIONS:  - Perform AM-PAC 6 Click Basic Mobility/ Daily Activity assessment daily.  - Set and communicate daily mobility goal to care team and patient/family/caregiver.   - Collaborate with rehabilitation services on mobility goals if consulted  - Perform Range of Motion 3 times a day.  - Reposition patient every 2 hours.  - Dangle patient 3 times a day  - Stand patient 3 times a day  - Ambulate patient 3 times a day  - Out of bed to chair 3 times a day   - Out of bed for meals 3 times a day  - Out of bed for toileting  - Record patient progress and toleration of activity level   Outcome: Progressing     Problem: DISCHARGE PLANNING  Goal: Discharge to home or other facility with appropriate resources  Description: INTERVENTIONS:  - Identify barriers to discharge w/patient and caregiver  -  Arrange for needed discharge resources and transportation as appropriate  - Identify discharge learning needs (meds, wound care, etc.)  - Arrange for interpretive services to assist at discharge as needed  - Refer to Case Management Department for coordinating discharge planning if the patient needs post-hospital services based on physician/advanced practitioner order or complex needs related to functional status, cognitive ability, or social support system  Outcome: Progressing     Problem: Knowledge Deficit  Goal: Patient/family/caregiver demonstrates understanding of disease process, treatment plan, medications, and discharge instructions  Description: Complete learning assessment and assess knowledge base.  Interventions:  - Provide teaching at level of understanding  - Provide teaching via preferred learning methods  Outcome: Progressing

## 2024-11-26 ENCOUNTER — RESULTS FOLLOW-UP (OUTPATIENT)
Dept: CARDIOLOGY CLINIC | Facility: CLINIC | Age: 49
End: 2024-11-26

## 2024-11-26 ENCOUNTER — CLINICAL SUPPORT (OUTPATIENT)
Dept: CARDIOLOGY CLINIC | Facility: CLINIC | Age: 49
End: 2024-11-26
Payer: COMMERCIAL

## 2024-11-26 DIAGNOSIS — I63.9 CEREBROVASCULAR ACCIDENT (CVA), UNSPECIFIED MECHANISM (HCC): ICD-10-CM

## 2024-11-26 PROCEDURE — 93228 REMOTE 30 DAY ECG REV/REPORT: CPT | Performed by: INTERNAL MEDICINE

## 2024-12-18 ENCOUNTER — HOSPITAL ENCOUNTER (OUTPATIENT)
Dept: NON INVASIVE DIAGNOSTICS | Facility: HOSPITAL | Age: 49
Discharge: HOME | End: 2024-12-18
Payer: COMMERCIAL

## 2024-12-18 ENCOUNTER — TELEPHONE (OUTPATIENT)
Dept: CARDIAC SURGERY | Facility: CLINIC | Age: 49
End: 2024-12-18

## 2024-12-18 ENCOUNTER — OFFICE VISIT (OUTPATIENT)
Age: 49
End: 2024-12-18
Payer: COMMERCIAL

## 2024-12-18 VITALS
WEIGHT: 219 LBS | HEIGHT: 68 IN | BODY MASS INDEX: 33.19 KG/M2 | HEART RATE: 72 BPM | SYSTOLIC BLOOD PRESSURE: 117 MMHG | DIASTOLIC BLOOD PRESSURE: 69 MMHG

## 2024-12-18 VITALS
TEMPERATURE: 97.5 F | HEIGHT: 68 IN | SYSTOLIC BLOOD PRESSURE: 121 MMHG | HEART RATE: 66 BPM | WEIGHT: 230 LBS | BODY MASS INDEX: 34.86 KG/M2 | OXYGEN SATURATION: 95 % | DIASTOLIC BLOOD PRESSURE: 70 MMHG

## 2024-12-18 DIAGNOSIS — Z87.74 S/P PATENT FORAMEN OVALE CLOSURE: ICD-10-CM

## 2024-12-18 DIAGNOSIS — Q21.12 PFO (PATENT FORAMEN OVALE): Primary | ICD-10-CM

## 2024-12-18 LAB
APICAL FOUR CHAMBER EJECTION FRACTION: 61 %
BSA FOR ECHO PROCEDURE: 2.12 M2
SL CV LV EF: 60

## 2024-12-18 PROCEDURE — 93321 DOPPLER ECHO F-UP/LMTD STD: CPT | Performed by: INTERNAL MEDICINE

## 2024-12-18 PROCEDURE — 93321 DOPPLER ECHO F-UP/LMTD STD: CPT

## 2024-12-18 PROCEDURE — 93325 DOPPLER ECHO COLOR FLOW MAPG: CPT

## 2024-12-18 PROCEDURE — 99212 OFFICE O/P EST SF 10 MIN: CPT | Performed by: INTERNAL MEDICINE

## 2024-12-18 PROCEDURE — 93308 TTE F-UP OR LMTD: CPT | Performed by: INTERNAL MEDICINE

## 2024-12-18 PROCEDURE — 93325 DOPPLER ECHO COLOR FLOW MAPG: CPT | Performed by: INTERNAL MEDICINE

## 2024-12-18 PROCEDURE — 93308 TTE F-UP OR LMTD: CPT

## 2024-12-18 NOTE — PROGRESS NOTES
Cardiology Follow Up Visit     [facility]    Pedrito Gaines  1975  [id number]  CARDIOVASC PHYSICIAN  [address] [place] [id number]  [phone]  [phone]    1. PFO (patent foramen ovale)  Echo complete w/ contrast if indicated      2. S/P patent foramen ovale closure, 25mm Amplatzer PFO occluder, on 11/20/2024  Echo complete w/ contrast if indicated            Discussion/Summary:    S/p PFO closure.  Echocardiogram today good device position and function.  No evidence of residual shunt.    Plan: Continue aspirin 81 mg indefinitely.  Patient stopped his statin as he was worried about dementia and does not want to take.  Annual follow-up with echocardiogram arranged.    Interval History:    Patient presents post PFO closure last month for secondary prevention of probable PFO related stroke    Patient has had no post procedure issues.    Echocardiogram today with good device position and function.    Patient feels well.    Patient Active Problem List   Diagnosis    Exercise-induced asthma    Acquired nasal deformity excluding deviated septum    Saddle nose deformity, acquired    Nasal septal deviation    Nasal turbinate hypertrophy    Nasal obstruction    Episodic tension-type headache, not intractable    Ocular migraine    Traumatic brain injury, closed (HCC)    Cervicalgia    Acute embolic stroke (HCC)    Pulmonary nodule    VSD (ventricular septal defect)    PFO (patent foramen ovale)    S/P patent foramen ovale closure, 25mm Amplatzer PFO occluder, on 11/20/2024     Past Medical History:   Diagnosis Date    Environmental allergies     Migraines      Social History     Socioeconomic History    Marital status: /Civil Union     Spouse name: Not on file    Number of children: Not on file    Years of education: Not on file    Highest education level: Not on file   Occupational History    Not on file   Tobacco Use     Smoking status: Former    Smokeless tobacco: Former     Types: Chew     Quit date:     Tobacco comments:     quit 2016   Vaping Use    Vaping status: Never Used   Substance and Sexual Activity    Alcohol use: Yes     Comment: occasional    Drug use: Never    Sexual activity: Not on file   Other Topics Concern    Not on file   Social History Narrative    Consumes 2 cups of coffee per day     Social Drivers of Health     Financial Resource Strain: Not on file   Food Insecurity: No Food Insecurity (10/22/2024)    Nursing - Inadequate Food Risk Classification     Worried About Running Out of Food in the Last Year: Not on file     Ran Out of Food in the Last Year: Not on file     Ran Out of Food in the Last Year: 1   Transportation Needs: No Transportation Needs (10/22/2024)    Nursing - Transportation Risk Classification     Lack of Transportation: Not on file     Lack of Transportation: 2   Physical Activity: Not on file   Stress: Not on file   Social Connections: Not on file   Intimate Partner Violence: Unknown (10/22/2024)    Nursing IPS     Feels Physically and Emotionally Safe: Not on file     Physically Hurt by Someone: Not on file     Humiliated or Emotionally Abused by Someone: Not on file     Physically Hurt by Someone: 2     Hurt or Threatened by Someone: 2   Housing Stability: Unknown (10/22/2024)    Nursing: Inadequate Housing Risk Classification     Has Housing: Not on file     Worried About Losing Housing: Not on file     Unable to Get Utilities: Not on file     Unable to Pay for Housing in the Last Year: 2     Has Housin      Family History   Problem Relation Age of Onset    Arthritis Mother     Diabetes Mother      Past Surgical History:   Procedure Laterality Date    CARDIAC CATHETERIZATION N/A 2024    Procedure: Cardiac pfo closure;  Surgeon: Alan Barrett DO;  [facility];  Service: Cardiology    CLOSED MANIPULATION SHOULDER Left     HAND SURGERY Right      "fracture with screw placement    SD REPAIR NASAL VESTIBULAR STENOSIS N/A 5/21/2020    Procedure: REPAIR VESTIBULAR STENOSIS;  Surgeon: Miguel Watkins MD;  [facility];  Service: ENT    SD SEPTOPLASTY/SUBMUCOUS RESECJ W/WO CARTILAGE GRF N/A 5/21/2020    Procedure: SEPTOPLASTY;  Surgeon: Miguel Watkins MD;  [facility];  Service: ENT    SD SUBMUCOUS RESCJ INFERIOR TURBINATE PRTL/COMPL N/A 5/21/2020    Procedure: SUBMUCOSAL RESECTION OF TURBINATES WITH CADAVERIC RIB GRAFT;  Surgeon: Miguel Watkins MD;  [facility];  Service: ENT       Current Outpatient Medications:     APPLE CIDER VINEGAR PO, Take by mouth, Disp: , Rfl:     aspirin 81 mg chewable tablet, Chew 1 tablet (81 mg total) daily, Disp: 30 tablet, Rfl: 0    Cyanocobalamin (VITAMIN B 12 PO), Take by mouth, Disp: , Rfl:     Glutamine 500 MG CAPS, Take 2 capsules by mouth daily, Disp: , Rfl:     Multiple Vitamin (MULTIVITAMINS PO), Take 1 tablet by mouth daily, Disp: , Rfl:     atorvastatin (LIPITOR) 40 mg tablet, Take 1 tablet (40 mg total) by mouth every evening (Patient not taking: Reported on 12/18/2024), Disp: 30 tablet, Rfl: 11    clopidogrel (PLAVIX) 75 mg tablet, Take 1 tablet (75 mg total) by mouth daily (Patient not taking: Reported on 12/18/2024), Disp: 30 tablet, Rfl: 2  Allergies   Allergen Reactions    Hydrocodone Itching         Social, Family, Medication history reviewed and updated as necessary      Labs:     Lab Results   Component Value Date    K 3.9 11/21/2024     11/21/2024    CO2 26 11/21/2024    BUN 18 11/21/2024    CREATININE 0.89 11/21/2024    CREATININE 1.00 10/23/2024    CALCIUM 8.2 (L) 11/21/2024       Lab Results   Component Value Date    WBC 4.76 11/21/2024    HGB 13.4 11/21/2024    HGB 14.4 10/23/2024    HCT 40.2 11/21/2024    HCT 41.7 10/23/2024     (L) 11/21/2024     10/23/2024       No results found for: \"CHOL\"  Lab Results   Component Value Date    HDL 53 10/23/2024    HDL 51 " "07/13/2023     Lab Results   Component Value Date    LDLCALC 91 10/23/2024    LDLCALC 78 07/13/2023     No results found for: \"LDLDIRECT\"          Lab Results   Component Value Date    TRIG 59 10/23/2024    TRIG 43 07/13/2023       Lab Results   Component Value Date    ALT 24 07/13/2023    ALT 29 07/26/2022    AST 20 07/13/2023    AST 21 07/26/2022    ALKPHOS 53 07/13/2023    ALKPHOS 54 07/26/2022       Lab Results   Component Value Date    INR 0.97 10/22/2024       No results found for: \"NTBNP\"    Lab Results   Component Value Date    HGBA1C 5.1 10/23/2024           Imaging: Reviewed in epic        Review of Systems:  14 systems reviewed and negative with exception of the above        PHYSICAL EXAM:      Vitals:    12/18/24 1434   BP: 121/70   Pulse: 66   Temp: 97.5 °F (36.4 °C)   SpO2: 95%     Body mass index is 34.97 kg/m².   Weight (last 2 days)       Date/Time Weight    12/18/24 1434 104 (230)              Gen: No acute distress  HEENT: anicteric, mucous membranes moist  Neck: supple, no jugular venous distention, or carotid bruit  Heart: regular, normal s1 and s2, no murmur/rub or gallop  Lungs :clear to auscultation bilaterally, no rales/rhonchi or wheeze  Abdomen: soft nontender, normoactive bowel sounds, no organomegaly  Ext: warm and perfused, normal femoral pulses, no edema, or clubbing  Skin: warm, no rashes  Neuro: AAO x 3, no focal findings  Psychiatric: normal affect  Musculoskeletal: no obvious joint deformities.        This note was completed in part utilizing  direct voice recognition software.   Grammatical errors, random word insertion, spelling mistakes, and incomplete sentences may be an occasional consequence of the system secondary to software limitations, ambient noise and hardware issues. At the time of dictation, efforts were made to edit, clarify and /or correct errors.  Please read the chart carefully and recognize, using context, where substitutions have occurred.  If you have any " questions or concerns about the context, text or information contained within the body of this dictation, please contact myself, the provider, for further clarification.

## 2024-12-18 NOTE — TELEPHONE ENCOUNTER
Called Pt to ask him to come to the office  after scheduled Echo per Dr. Barrett's request. Pt replied they would come in as soon as they are finished with their Echo.

## 2025-01-08 ENCOUNTER — TELEPHONE (OUTPATIENT)
Age: 50
End: 2025-01-08

## 2025-01-08 DIAGNOSIS — Z87.74 S/P PATENT FORAMEN OVALE CLOSURE: Primary | ICD-10-CM

## 2025-01-08 RX ORDER — AMOXICILLIN 500 MG/1
CAPSULE ORAL
Qty: 4 CAPSULE | Refills: 1 | Status: SHIPPED | OUTPATIENT
Start: 2025-01-08 | End: 2025-01-08

## 2025-01-08 NOTE — TELEPHONE ENCOUNTER
Received call from Vazquez just had a procedure for a PFO. He had an appt last night and due to the procedure the dental office refused to do a cleaning.     Advised usually about 6 mos.   Please confirm if can have dental cleaning now or needs to wait 6 mos.     Pt is taking Plavix.     Dental office c/b [phone]

## 2025-01-24 ENCOUNTER — HOSPITAL ENCOUNTER (OUTPATIENT)
Dept: CT IMAGING | Facility: HOSPITAL | Age: 50
Discharge: HOME | End: 2025-01-24
Payer: COMMERCIAL

## 2025-01-24 DIAGNOSIS — R91.1 INCIDENTAL LUNG NODULE, > 3MM AND < 8MM: ICD-10-CM

## 2025-01-24 PROCEDURE — 71250 CT THORAX DX C-: CPT

## 2025-01-29 ENCOUNTER — RESULTS FOLLOW-UP (OUTPATIENT)
Dept: FAMILY MEDICINE CLINIC | Facility: CLINIC | Age: 50
End: 2025-01-29

## 2025-02-05 ENCOUNTER — TELEPHONE (OUTPATIENT)
Age: 50
End: 2025-02-05

## 2025-02-06 ENCOUNTER — OFFICE VISIT (OUTPATIENT)
Age: 50
End: 2025-02-06
Payer: COMMERCIAL

## 2025-02-06 ENCOUNTER — APPOINTMENT (OUTPATIENT)
Age: 50
End: 2025-02-06
Payer: COMMERCIAL

## 2025-02-06 DIAGNOSIS — M25.532 LEFT WRIST PAIN: ICD-10-CM

## 2025-02-06 DIAGNOSIS — M19.032 ARTHRITIS OF LEFT WRIST: Primary | ICD-10-CM

## 2025-02-06 PROCEDURE — 73110 X-RAY EXAM OF WRIST: CPT

## 2025-02-06 PROCEDURE — 99203 OFFICE O/P NEW LOW 30 MIN: CPT | Performed by: ORTHOPAEDIC SURGERY

## 2025-02-06 RX ORDER — MELOXICAM 15 MG/1
15 TABLET ORAL DAILY
Qty: 30 TABLET | Refills: 0 | Status: SHIPPED | OUTPATIENT
Start: 2025-02-06

## 2025-02-06 NOTE — PROGRESS NOTES
Assessment/ Plan:   LEFT wrist pain, osteoarthritis      Discussed treatment options  Mobic script sent PRN   Already has a brace  He is interested in injections for the wrist, provided referral to hand for this  Referral to hand specialist for possible CSI   Follow up as needed       NEW PATIENT   Chief complaint: LEFT wrist pain    HPI: Pedrito Gaines is a 49 y.o. year old right hand dominant male who presents with LEFT wrist pain.    L wrist pain since prior to 2016  Worsening pain, especially with movement  Sharp, shooting in nature   Medial portion     Wearing a brace occasionally   Taking Ibuprofen as needed     CSI completed 2020, moderate relief    Worked as a professional  for many years       Past Medical and Surgical History:  Past Medical History:   Diagnosis Date    Environmental allergies     Migraines        Past Surgical History:   Procedure Laterality Date    CARDIAC CATHETERIZATION N/A 11/20/2024    Procedure: Cardiac pfo closure;  Surgeon: Alan Barrett DO;  [facility];  Service: Cardiology    CLOSED MANIPULATION SHOULDER Left     HAND SURGERY Right     fracture with screw placement    SC REPAIR NASAL VESTIBULAR STENOSIS N/A 5/21/2020    Procedure: REPAIR VESTIBULAR STENOSIS;  Surgeon: Miguel Watkins MD;  [facility];  Service: ENT    SC SEPTOPLASTY/SUBMUCOUS RESECJ W/WO CARTILAGE GRF N/A 5/21/2020    Procedure: SEPTOPLASTY;  Surgeon: Miguel Watkins MD;  [facility];  Service: ENT    SC SUBMUCOUS RESCJ INFERIOR TURBINATE PRTL/COMPL N/A 5/21/2020    Procedure: SUBMUCOSAL RESECTION OF TURBINATES WITH CADAVERIC RIB GRAFT;  Surgeon: Miguel Watkins MD;  [facility];  Service: ENT       Medications:    Current Outpatient Medications:     APPLE CIDER VINEGAR PO, Take by mouth, Disp: , Rfl:     aspirin 81 mg chewable tablet, Chew 1 tablet (81 mg total) daily, Disp: 30 tablet, Rfl: 0    atorvastatin (LIPITOR) 40 mg tablet, Take 1 tablet  (40 mg total) by mouth every evening (Patient not taking: Reported on 12/18/2024), Disp: 30 tablet, Rfl: 11    clopidogrel (PLAVIX) 75 mg tablet, Take 1 tablet (75 mg total) by mouth daily (Patient not taking: Reported on 12/18/2024), Disp: 30 tablet, Rfl: 2    Cyanocobalamin (VITAMIN B 12 PO), Take by mouth, Disp: , Rfl:     Glutamine 500 MG CAPS, Take 2 capsules by mouth daily, Disp: , Rfl:     Multiple Vitamin (MULTIVITAMINS PO), Take 1 tablet by mouth daily, Disp: , Rfl:     Allergies:  Allergies   Allergen Reactions    Hydrocodone Itching         PE:  Left UE exam:    Skin intact  No swelling  No deformity  +tenderness over distal radioulnar joint  No tenderness over forearm or elbow  Motor intact r/m/ain/u  SILT: r/m/u  wwp      Radiology: I independently reviewed and interpreted the images.  Radiographs: LEFT wrist X-Ray: radiocarpal degenerative changes, ulnocarpal degenerative changes, distal radioulnar arthritis      CC:  Devin Zheng MD Self, Referral

## 2025-02-07 ENCOUNTER — OFFICE VISIT (OUTPATIENT)
Dept: OBGYN CLINIC | Facility: CLINIC | Age: 50
End: 2025-02-07
Payer: COMMERCIAL

## 2025-02-07 DIAGNOSIS — Q74.0 CONGENITAL POSITIVE ULNAR VARIANCE OF LEFT WRIST: ICD-10-CM

## 2025-02-07 DIAGNOSIS — M19.032 DRUJ (DISTAL RADIOULNAR JOINT) ARTHROSIS, PRIMARY, LEFT: Primary | ICD-10-CM

## 2025-02-07 PROCEDURE — 99214 OFFICE O/P EST MOD 30 MIN: CPT | Performed by: ORTHOPAEDIC SURGERY

## 2025-02-07 PROCEDURE — 20605 DRAIN/INJ JOINT/BURSA W/O US: CPT | Performed by: ORTHOPAEDIC SURGERY

## 2025-02-07 RX ORDER — ROPIVACAINE HYDROCHLORIDE 2 MG/ML
1 INJECTION, SOLUTION EPIDURAL; INFILTRATION; PERINEURAL
Status: COMPLETED | OUTPATIENT
Start: 2025-02-07 | End: 2025-02-07

## 2025-02-07 RX ORDER — BETAMETHASONE SODIUM PHOSPHATE AND BETAMETHASONE ACETATE 3; 3 MG/ML; MG/ML
6 INJECTION, SUSPENSION INTRA-ARTICULAR; INTRALESIONAL; INTRAMUSCULAR; SOFT TISSUE
Status: COMPLETED | OUTPATIENT
Start: 2025-02-07 | End: 2025-02-07

## 2025-02-07 RX ADMIN — ROPIVACAINE HYDROCHLORIDE 1 ML: 2 INJECTION, SOLUTION EPIDURAL; INFILTRATION; PERINEURAL at 09:00

## 2025-02-07 RX ADMIN — BETAMETHASONE SODIUM PHOSPHATE AND BETAMETHASONE ACETATE 6 MG: 3; 3 INJECTION, SUSPENSION INTRA-ARTICULAR; INTRALESIONAL; INTRAMUSCULAR; SOFT TISSUE at 09:00

## 2025-02-07 NOTE — PROGRESS NOTES
The HAND & UPPER EXTREMITY OFFICE VISIT   Referred By:  Delores Costa Pa-c  [address] [facility]  [address]  [place]  [place] [address]      Chief Complaint:     Left wrist pain    History of Present Illness:   49 y.o., male presents with chronic left wrist pain for about 10 years. He reports his pain has been on and off for several years without any specific trauma or injury. He previously worked as a professional  for about 18 years. He reports previously having a CSI in the wrist in 2020 which provided good relief of his symptoms until about 4 days ago. He tried wearing a removable wrist brace without significant relief. He would like to discuss potential CSI and his surgical options.     Previously seen and referred by Dr. Burger yesterday. Note and imaging reviewed in detail today. He was prescribed mobic and referred to me to discuss other management options. He has not yet started mobic.       ADLs: Community ambulator  Smoke: former ETOH: occasionally   Drugs:  denies        Past Medical History:  Past Medical History:   Diagnosis Date    Environmental allergies     Migraines      Past Surgical History:   Procedure Laterality Date    CARDIAC CATHETERIZATION N/A 11/20/2024    Procedure: Cardiac pfo closure;  Surgeon: Alan Barrett DO;  [facility];  Service: Cardiology    CLOSED MANIPULATION SHOULDER Left     HAND SURGERY Right     fracture with screw placement    TN REPAIR NASAL VESTIBULAR STENOSIS N/A 5/21/2020    Procedure: REPAIR VESTIBULAR STENOSIS;  Surgeon: Miguel Watkins MD;  [facility];  Service: ENT    TN SEPTOPLASTY/SUBMUCOUS RESECJ W/WO CARTILAGE GRF N/A 5/21/2020    Procedure: SEPTOPLASTY;  Surgeon: Miguel Watkins MD;  [facility];  Service: ENT    TN SUBMUCOUS RESCJ INFERIOR TURBINATE PRTL/COMPL N/A 5/21/2020    Procedure: SUBMUCOSAL RESECTION OF TURBINATES WITH CADAVERIC RIB GRAFT;  Surgeon: Miguel Watkins MD;  [facility];  Service:  ENT     Family History   Problem Relation Age of Onset    Arthritis Mother     Diabetes Mother      Social History     Socioeconomic History    Marital status: /Civil Union     Spouse name: Not on file    Number of children: Not on file    Years of education: Not on file    Highest education level: Not on file   Occupational History    Not on file   Tobacco Use    Smoking status: Former    Smokeless tobacco: Former     Types: Chew     Quit date: 2016    Tobacco comments:     quit 2016   Vaping Use    Vaping status: Never Used   Substance and Sexual Activity    Alcohol use: Yes     Comment: occasional    Drug use: Never    Sexual activity: Not on file   Other Topics Concern    Not on file   Social History Narrative    Consumes 2 cups of coffee per day     Social Drivers of Health     Financial Resource Strain: Not on file   Food Insecurity: No Food Insecurity (10/22/2024)    Nursing - Inadequate Food Risk Classification     Worried About Running Out of Food in the Last Year: Not on file     Ran Out of Food in the Last Year: Not on file     Ran Out of Food in the Last Year: Never true   Transportation Needs: No Transportation Needs (10/22/2024)    Nursing - Transportation Risk Classification     Lack of Transportation: Not on file     Lack of Transportation: No   Physical Activity: Not on file   Stress: Not on file   Social Connections: Not on file   Intimate Partner Violence: Unknown (10/22/2024)    Nursing IPS     Feels Physically and Emotionally Safe: Not on file     Physically Hurt by Someone: Not on file     Humiliated or Emotionally Abused by Someone: Not on file     Physically Hurt by Someone: No     Hurt or Threatened by Someone: No   Housing Stability: Unknown (10/22/2024)    Nursing: Inadequate Housing Risk Classification     Has Housing: Not on file     Worried About Losing Housing: Not on file     Unable to Get Utilities: Not on file     Unable to Pay for Housing in the Last Year: No     Has  Housin     Scheduled Meds:  Continuous Infusions:No current facility-administered medications for this visit.    PRN Meds:.  Allergies   Allergen Reactions    Hydrocodone Itching           Physical Examination:    There were no vitals taken for this visit.    Gen: A&Ox3, NAD  Cardiac: regular rate  Chest: non labored breathing  Abdomen: Non-distended      Right Upper Extremity:  Skin CDI  No obvious deformity of the shoulder, arm, elbow, forearm, wrist, hand  Sensation intact to light touch in the axillary median, ulnar, and radial nerve distributions  Warm, well-perfused digits      Left Upper Extremity:  Skin CDI  No obvious deformity of the shoulder, arm, elbow, forearm, wrist, hand  Swelling Negative  TTP over DRUJ  Non-tender ulnar styloid, TFCC fovea  Sensation intact to light touch in the axillary median, ulnar, and radial nerve distributions  Full digital and wrist ROM without pain  Warm, well-perfused digits  Cap refill <2s    Studies:  Radiographs: I personally reviewed and independently interpreted the available radiographs.   25: Radiographs of the left wrist, multiple views, demonstrate roughly 7 mm ulnar positive variance with chronic degenerative changes involving the DRUJ. There is also a concave surface of the proximal ulnar lunate corresponding to the prominent ulnar head consistent with congenital ulnar positive variance.  The angle of the DRUJ also is corresponded with a congenital ulnar positive variance.  Carpal alignment within normal limits.    Assessment and Plan:  1. DRUJ (distal radioulnar joint) arthrosis, primary, left  Ambulatory Referral to Orthopedic Surgery    Medium joint arthrocentesis: L distal radioulnar      2. 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 male presents with signs and symptoms consistent with the above diagnosis.  We discussed the natural  history of this condition and its pathogenesis.  We discussed operative and nonoperative treatment options. He has previously tried bracing and oral anti-inflammatories intermittently without significant relief. He reports that he prefers to avoid oral medications if possible. He previously had good relief of his symptoms with a CSI in 2020 and would like to try another injection today. Risks, benefits and alternative treatments were discussed with the patient. These risks of injection include but are not limited to: bleeding, infection, allergic reaction to agents, possible increase in pain, and/or elevation in blood sugar. Patient understands and would like to proceed with the proposed procedure. Patient tolerated the procedure well without any complications. See procedure note for details. he may take NSAIDs/Tylenol or apply ice to the area as needed for post-injection discomfort.     We also discussed surgical options including DRUJ arthroplasty, Darrach or SK procedures. We reviewed the details of the procedures and the anticipated recovery periods. He is not interested in surgery at this time due to the prolonged recovery period and infrequent flare ups of his symptoms. He would prefer to continue with CSI as needed at this point. We can consider and further discuss surgical interventions in the future if his symptoms progress or he no longer gains significant relief from CSI.     It is recommended he return to the office in 3 months as needed for repeat CSI.     he expressed understanding of the plan and agreed. We encouraged them to contact our office with any questions or concerns.         Cisco Guzman MD  Hand and Upper Extremity Surgery        *This note was dictated using Dragon voice recognition software. Please excuse any word substitutions or errors.*    Medium joint arthrocentesis: L distal radioulnar  Universal Protocol:  procedure performed by consultantConsent: Verbal consent obtained.  Risks  and benefits: risks, benefits and alternatives were discussed  Consent given by: patient  Patient understanding: patient states understanding of the procedure being performed  Patient consent: the patient's understanding of the procedure matches consent given  Site marked: the operative site was marked  Radiology Images displayed and confirmed. If images not available, report reviewed: imaging studies available  Required items: required blood products, implants, devices, and special equipment available  Patient identity confirmed: verbally with patient  Supporting Documentation  Indications: pain   Procedure Details  Location: wrist - L distal radioulnar  Preparation: Patient was prepped and draped in the usual sterile fashion  Needle size: 25 G  Ultrasound guidance: no  Approach: dorsal  Medications administered: 6 mg betamethasone acetate-betamethasone sodium phosphate 6 (3-3) mg/mL; 1 mL ropivacaine 0.2 %    Patient tolerance: patient tolerated the procedure well with no immediate complications  Dressing:  Sterile dressing applied              Scribe Attestation      I,:  Jina Carlos PA-C am acting as a scribe while in the presence of the attending physician.:       I,:  Cisco Guzman MD personally performed the services described in this documentation    as scribed in my presence.:

## 2025-02-12 ENCOUNTER — OFFICE VISIT (OUTPATIENT)
Dept: NEUROLOGY | Facility: CLINIC | Age: 50
End: 2025-02-12
Payer: COMMERCIAL

## 2025-02-12 VITALS
SYSTOLIC BLOOD PRESSURE: 112 MMHG | HEART RATE: 64 BPM | DIASTOLIC BLOOD PRESSURE: 80 MMHG | OXYGEN SATURATION: 98 % | WEIGHT: 226 LBS | BODY MASS INDEX: 34.25 KG/M2 | TEMPERATURE: 98 F | HEIGHT: 68 IN

## 2025-02-12 DIAGNOSIS — I63.9 EMBOLIC STROKE (HCC): ICD-10-CM

## 2025-02-12 DIAGNOSIS — I63.9 ACUTE EMBOLIC STROKE (HCC): ICD-10-CM

## 2025-02-12 DIAGNOSIS — G43.109 OCULAR MIGRAINE: ICD-10-CM

## 2025-02-12 DIAGNOSIS — J34.2 NASAL SEPTAL DEVIATION: ICD-10-CM

## 2025-02-12 DIAGNOSIS — G31.84 MILD COGNITIVE IMPAIRMENT: ICD-10-CM

## 2025-02-12 DIAGNOSIS — J34.3 NASAL TURBINATE HYPERTROPHY: Primary | ICD-10-CM

## 2025-02-12 DIAGNOSIS — H53.2 DOUBLE VISION: ICD-10-CM

## 2025-02-12 PROCEDURE — 99215 OFFICE O/P EST HI 40 MIN: CPT | Performed by: PSYCHIATRY & NEUROLOGY

## 2025-02-12 NOTE — PATIENT INSTRUCTIONS
Plan:  Vitamin: B 12   Lifestyle recommendations:  Regular Physical Exercise: Physical activity has been linked to improved cognitive function and reduced risk of cognitive decline. Aim for a combination of aerobic exercise (such as walking, swimming, or cycling) and strength training exercises several times a week.  Healthy Diet: A balanced diet rich in fruits, vegetables, whole grains, lean proteins, and healthy fats can support brain health. Foods high in antioxidants, omega-3 fatty acids, and vitamins B, C, D, and E may have neuroprotective effects.  Mental Stimulation: Engage in activities that challenge your brain, such as reading, puzzles, crossword puzzles, learning a new language, playing musical instruments, or engaging in hobbies that require problem-solving and critical thinking.  Social Engagement: Maintain social connections and participate in social activities with friends, family, and community groups. Social interaction can help reduce stress, improve mood, and stimulate cognitive function.  Quality Sleep: Prioritize good sleep hygiene and aim for 7-9 hours of quality sleep per night. Poor sleep can impair cognitive function and memory consolidation.  Stress Management: Practice stress-reduction techniques such as mindfulness, meditation, deep breathing exercises, or yoga to manage stress levels. Chronic stress can have detrimental effects on cognitive function.  Intellectual Curiosity: Stay curious and open to learning new things throughout life. Explore new topics, take up new hobbies or interests, and continue to challenge yourself intellectually.  Maintain Overall Health: Manage chronic health conditions such as hypertension, diabetes, and high cholesterol through regular medical check-ups, medication adherence, and lifestyle modifications. These conditions can impact cognitive function if left untreated.  Limit Alcohol and Avoid Smoking: Excessive alcohol consumption and smoking can have  negative effects on brain health and increase the risk of cognitive decline. Limit alcohol intake and avoid smoking to promote brain health.  Stay mentally and socially active: Engage in activities that stimulate your mind and maintain social connections. This could include volunteering, joining clubs or groups, participating in community events, or taking up new hobbies or interests.

## 2025-02-12 NOTE — PROGRESS NOTES
Name: Pedrito Gaines      : 1975      MRN: [id number]  Encounter Provider: Amisha Calderón MD  Encounter Date: 2025   Encounter department: [facility] [place]  :  Assessment & Plan  Embolic stroke (HCC)  2024 patient presented with right upper extremity numbness and weakness, right facial sensory dysfunction and weakness with blurry vision.  Workup showed acute ischemia involving the left superior parietal lobe.  TTE with left to right shunting.  MODESTA showed a PFO.  Patient underwent closure of PFO in 2024.  Patient had a Zio patch done which did not show any arrhythmias.  He is following with cardiology.    Patient today is stable without any new strokelike symptoms.  He reports that he has residual double vision when looking down.    At this time, stroke likely cardioembolic from PFO. I expect double vision to improve overtime although may need prism glasses in the future.      Plan:  Will focus on stroke risk prevention  Antiplatelet therapy: ASA 81  Goal LDL < 70   Patient not taking atorvastatin, I counseled on reasoning behind statin medication. Patient understood and politely decline. Given information on a heart healthy diet   Monitor blood pressure with goal <130/80  Will refer to sleep medicine for evaluation of obstructive sleep apnea  Counseled on lifestyle measures to reduce stroke burden if applicable, such as:  Smoking cessation   Reducing alcohol intake  Encouraging physical activity  Encouraging weight loss  A low-carbohydrate diet reducing the intake of foods rich in carbohydrates, such as bread, pasta, rice, and sugary snacks.Emphasizes foods that are high in protein, healthy fats, and non-starchy vegetables  Regular follow-up with their PCP  If they have any stroke-like symptoms including sudden painless loss of vision or double vision, difficulty speaking or swallowing, vertigo/room spinning that does not quickly resolve, or  weakness/numbness affecting 1 side of the face or body he should proceed by ambulance to the nearest emergency room immediately.  Follow-up in 4 months       Mild cognitive impairment  Patient reports significant forgetfulness predating his stroke.  Examples include forgetting today's appointment.  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  Symptoms predated stroke.  Patient has a history of multiple concussions.  Patient reports that he tends to do multiple things at the same time.  [place] today 23/30.    At this time, patient with a mild cognitive impairment.  He has multiple things going on at the same time especially with recovery from his stroke.  I would like him to see sleep medicine to evaluate for obstructive sleep apnea given his history of nasal septal deviation and nasal turbinate hypertrophy.  I spent a significant portion of our visit today counseling on lifestyle changes.    Plan:  Vitamin B 12  Lifestyle recommendations:  Regular Physical Exercise: Physical activity has been linked to improved cognitive function and reduced risk of cognitive decline. Aim for a combination of aerobic exercise (such as walking, swimming, or cycling) and strength training exercises several times a week.  Healthy Diet: A balanced diet rich in fruits, vegetables, whole grains, lean proteins, and healthy fats can support brain health. Foods high in antioxidants, omega-3 fatty acids, and vitamins B, C, D, and E may have neuroprotective effects.  Mental Stimulation: Engage in activities that challenge your brain, such as reading, puzzles, crossword puzzles, learning a new language, playing musical instruments, or engaging in hobbies that require problem-solving and critical thinking.  Social Engagement: Maintain social connections and participate in social activities with friends, family, and community groups. Social interaction can help reduce stress, improve mood, and stimulate cognitive  function.  Quality Sleep: Prioritize good sleep hygiene and aim for 7-9 hours of quality sleep per night. Poor sleep can impair cognitive function and memory consolidation.  Stress Management: Practice stress-reduction techniques such as mindfulness, meditation, deep breathing exercises, or yoga to manage stress levels. Chronic stress can have detrimental effects on cognitive function.  Intellectual Curiosity: Stay curious and open to learning new things throughout life. Explore new topics, take up new hobbies or interests, and continue to challenge yourself intellectually.  Maintain Overall Health: Manage chronic health conditions such as hypertension, diabetes, and high cholesterol through regular medical check-ups, medication adherence, and lifestyle modifications. These conditions can impact cognitive function if left untreated.  Limit Alcohol and Avoid Smoking: Excessive alcohol consumption and smoking can have negative effects on brain health and increase the risk of cognitive decline. Limit alcohol intake and avoid smoking to promote brain health.  Stay mentally and socially active: Engage in activities that stimulate your mind and maintain social connections. This could include volunteering, joining clubs or groups, participating in community events, or taking up new hobbies or interests.       Ocular migraine  Patient reports many years of unilateral sided pain preceded by blurry vision.  Headache triggered by dehydration.  Headache is made worse with Valsalva maneuvers.  Patient is aware of taking Tylenol/ibuprofen at onset of blurry vision to prevent progression of headache.       Nasal turbinate hypertrophy    Orders:    Ambulatory Referral to Sleep Medicine; Future    Nasal septal deviation    Orders:    Ambulatory Referral to Sleep Medicine; Future    Acute embolic stroke (HCC)    Orders:    Ambulatory Referral to Sleep Medicine; Future    Double vision               History of Present Illness    QUITA Gaines is a 49-year-old male with history of stroke, cervicalgia, tension type headache, exercise-induced asthma, nasal septal deviation with nasal obstruction, ocular migraines, PFO s/p closure and traumatic brain injury who presents as a hospital follow-up.  Patient was seen by inpatient neurology team in October 2024 after presenting with right upper extremity numbness and weakness as well as right facial sensory dysfunction and weakness with blurry vision.    Workup:  CTH: No CT evidence of large acute vascular territory infarct, intracranial hemorrhage or mass effect.   CTA: No large vessel occlusion, proximal high-grade stenosis or aneurysm involving the [place]. Within limitations of streak artifact no high-grade stenosis or dissection of the carotid or vertebral arteries.   MRI: Small focus of acute/recent ischemia involving the left superior parietal lobe. No hemorrhage or large territory infarct seen   TTE: EF 60% IVS: There appears to be a possible small, apical muscular ventricular septal defect with left to right shunting indicated by color flow Doppler.   MODESTA: Left Ventricle: Left ventricular cavity size is normal. The left ventricular ejection fraction is 60%. Systolic function is normal. Wall motion is normal.  IVS: There is a possible small, apical muscular ventricular septal defect with left to right shunting indicated by color flow Doppler.  Left Atrium: The atrium is mildly dilated.  Atrial Septum: There is a patent foramen ovale confirmed at rest with predominant right to left shunting using saline contrast.  Mitral Valve: There is systolic bowing of the anterior leaflet.    Zio patch   Interval history:  Forgetfulness   Headaches  Double vision new since the stroke   When he looks down he gets double vision when looking down      Not taking lipitor as it affected his memory. Went to work and forgot his pass code to go into the computer and to open the door.   Forgot his  appointment this morning   Problems with memory predated stroke   Paternal grandfather with alzheimers   Has a history of multiple concussions     Headaches   Present pre stroke   Headaches are sporadic. Gets unilateral right sided blurry vision. Peripheral blurry vision. If he doesn't take something at that time lasts for days. Headache worse with valsalva maneuvers.   Feels like he sleeps well.   Has noticed headaches are triggered by poor hydration   Caffeine helps   Previously on Sumatriptan     Review of Systems   Constitutional:  Negative for appetite change, fatigue and fever.   HENT: Negative.  Negative for hearing loss, tinnitus, trouble swallowing and voice change.    Eyes:  Positive for visual disturbance. Negative for photophobia and pain.   Respiratory: Negative.  Negative for shortness of breath.    Cardiovascular: Negative.  Negative for palpitations.   Gastrointestinal: Negative.  Negative for nausea and vomiting.   Endocrine: Negative.  Negative for cold intolerance.   Genitourinary: Negative.  Negative for dysuria, frequency and urgency.   Musculoskeletal:  Negative for back pain, gait problem, myalgias, neck pain and neck stiffness.   Skin: Negative.  Negative for rash.   Allergic/Immunologic: Negative.    Neurological: Negative.  Negative for dizziness, tremors, seizures, syncope, facial asymmetry, speech difficulty, weakness, light-headedness, numbness and headaches.        Pt presents with memory issues as the chief complaint. States he almost forgot his appointment this morning and has forgetfulness of words. Also mentioned some ocular issues, every time he looks down he gets double vision.   Hematological: Negative.  Does not bruise/bleed easily.   Psychiatric/Behavioral: Negative.  Negative for confusion, hallucinations and sleep disturbance.    All other systems reviewed and are negative.   I have personally reviewed the MA's review of systems and made changes as necessary.        "  Objective   Ht 5' 8\" (1.727 m)   BMI 34.97 kg/m²     Physical Exam  Neurological Exam          "

## 2025-02-12 NOTE — ASSESSMENT & PLAN NOTE
Patient reports significant forgetfulness predating his stroke.  Examples include forgetting today's appointment.  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  Symptoms predated stroke.  Patient has a history of multiple concussions.  Patient reports that he tends to do multiple things at the same time.  [place] today 23/30.    At this time, patient with a mild cognitive impairment.  He has multiple things going on at the same time especially with recovery from his stroke.  I would like him to see sleep medicine to evaluate for obstructive sleep apnea given his history of nasal septal deviation and nasal turbinate hypertrophy.  I spent a significant portion of our visit today counseling on lifestyle changes.    Plan:  Vitamin B 12  Lifestyle recommendations:  Regular Physical Exercise: Physical activity has been linked to improved cognitive function and reduced risk of cognitive decline. Aim for a combination of aerobic exercise (such as walking, swimming, or cycling) and strength training exercises several times a week.  Healthy Diet: A balanced diet rich in fruits, vegetables, whole grains, lean proteins, and healthy fats can support brain health. Foods high in antioxidants, omega-3 fatty acids, and vitamins B, C, D, and E may have neuroprotective effects.  Mental Stimulation: Engage in activities that challenge your brain, such as reading, puzzles, crossword puzzles, learning a new language, playing musical instruments, or engaging in hobbies that require problem-solving and critical thinking.  Social Engagement: Maintain social connections and participate in social activities with friends, family, and community groups. Social interaction can help reduce stress, improve mood, and stimulate cognitive function.  Quality Sleep: Prioritize good sleep hygiene and aim for 7-9 hours of quality sleep per night. Poor sleep can impair cognitive function and memory consolidation.  Stress  Management: Practice stress-reduction techniques such as mindfulness, meditation, deep breathing exercises, or yoga to manage stress levels. Chronic stress can have detrimental effects on cognitive function.  Intellectual Curiosity: Stay curious and open to learning new things throughout life. Explore new topics, take up new hobbies or interests, and continue to challenge yourself intellectually.  Maintain Overall Health: Manage chronic health conditions such as hypertension, diabetes, and high cholesterol through regular medical check-ups, medication adherence, and lifestyle modifications. These conditions can impact cognitive function if left untreated.  Limit Alcohol and Avoid Smoking: Excessive alcohol consumption and smoking can have negative effects on brain health and increase the risk of cognitive decline. Limit alcohol intake and avoid smoking to promote brain health.  Stay mentally and socially active: Engage in activities that stimulate your mind and maintain social connections. This could include volunteering, joining clubs or groups, participating in community events, or taking up new hobbies or interests.

## 2025-02-12 NOTE — ASSESSMENT & PLAN NOTE
October 2024 patient presented with right upper extremity numbness and weakness, right facial sensory dysfunction and weakness with blurry vision.  Workup showed acute ischemia involving the left superior parietal lobe.  TTE with left to right shunting.  MODESTA showed a PFO.  Patient underwent closure of PFO in November 2024.  Patient had a Zio patch done which did not show any arrhythmias.  He is following with cardiology.    Patient today is stable without any new strokelike symptoms.  He reports that he has residual double vision when looking down.    At this time, stroke likely cardioembolic from PFO. I expect double vision to improve overtime although may need prism glasses in the future.      Plan:  Will focus on stroke risk prevention  Antiplatelet therapy: ASA 81  Goal LDL < 70   Patient not taking atorvastatin, I counseled on reasoning behind statin medication. Patient understood and politely decline. Given information on a heart healthy diet   Monitor blood pressure with goal <130/80  Will refer to sleep medicine for evaluation of obstructive sleep apnea  Counseled on lifestyle measures to reduce stroke burden if applicable, such as:  Smoking cessation   Reducing alcohol intake  Encouraging physical activity  Encouraging weight loss  A low-carbohydrate diet reducing the intake of foods rich in carbohydrates, such as bread, pasta, rice, and sugary snacks.Emphasizes foods that are high in protein, healthy fats, and non-starchy vegetables  Regular follow-up with their PCP  If they have any stroke-like symptoms including sudden painless loss of vision or double vision, difficulty speaking or swallowing, vertigo/room spinning that does not quickly resolve, or weakness/numbness affecting 1 side of the face or body he should proceed by ambulance to the nearest emergency room immediately.  Follow-up in 4 months

## 2025-02-12 NOTE — ASSESSMENT & PLAN NOTE
Patient reports many years of unilateral sided pain preceded by blurry vision.  Headache triggered by dehydration.  Headache is made worse with Valsalva maneuvers.  Patient is aware of taking Tylenol/ibuprofen at onset of blurry vision to prevent progression of headache.

## 2025-02-28 DIAGNOSIS — M25.532 LEFT WRIST PAIN: ICD-10-CM

## 2025-02-28 RX ORDER — MELOXICAM 15 MG/1
15 TABLET ORAL DAILY
Qty: 30 TABLET | Refills: 2 | Status: SHIPPED | OUTPATIENT
Start: 2025-02-28

## 2025-03-13 ENCOUNTER — OFFICE VISIT (OUTPATIENT)
Dept: FAMILY MEDICINE CLINIC | Facility: CLINIC | Age: 50
End: 2025-03-13
Payer: COMMERCIAL

## 2025-03-13 VITALS — HEIGHT: 68 IN | BODY MASS INDEX: 34.36 KG/M2

## 2025-03-13 DIAGNOSIS — M54.50 ACUTE MIDLINE LOW BACK PAIN WITHOUT SCIATICA: ICD-10-CM

## 2025-03-13 DIAGNOSIS — Z00.00 ANNUAL PHYSICAL EXAM: Primary | ICD-10-CM

## 2025-03-13 DIAGNOSIS — I63.419 CEREBROVASCULAR ACCIDENT (CVA) DUE TO EMBOLISM OF MIDDLE CEREBRAL ARTERY, UNSPECIFIED BLOOD VESSEL LATERALITY (HCC): ICD-10-CM

## 2025-03-13 PROCEDURE — 99396 PREV VISIT EST AGE 40-64: CPT

## 2025-03-13 PROCEDURE — 99213 OFFICE O/P EST LOW 20 MIN: CPT

## 2025-03-13 RX ORDER — OXYCODONE AND ACETAMINOPHEN 5; 325 MG/1; MG/1
1 TABLET ORAL DAILY PRN
Qty: 7 TABLET | Refills: 0 | Status: SHIPPED | OUTPATIENT
Start: 2025-03-13

## 2025-03-13 RX ORDER — CYCLOBENZAPRINE HCL 10 MG
10 TABLET ORAL 3 TIMES DAILY PRN
Qty: 20 TABLET | Refills: 0 | Status: SHIPPED | OUTPATIENT
Start: 2025-03-13

## 2025-03-13 NOTE — PROGRESS NOTES
Adult Annual Physical  Name: Pedrito Gaines      : 1975      MRN: [id number]  Encounter Provider: Simeon Armstrong PA-C  Encounter Date: 3/13/2025   Encounter department: [facility] [address]    Assessment & Plan  Annual physical exam         Acute midline low back pain without sciatica  Pain located midline lumbar region for 4 day(s)  Described as sharp pain with motion, and is worsened by lifting motions rising from a chair.   Pt has attempted to alleviate the pain with tens unit and visiting chiropractor which has caused some relief.   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  Current pain is rated as a 8/10 pain.  Pertinent negatives at this time include no loss of bladder/bowel, no loss of sensation of the LE.   He had been taking a prior prescription of Percocet and a muscle relaxant which provide some relief  Exam is very remarkable for patient with difficulty of ambulation, has TENS unit attached has difficulty getting onto exam table  The low back is nontender, the patient notes that his pain feels deeper  Discussed conservative measures with patient including rest, increase hydration, avoidance of provocative movements  Will also Rx cyclobenzaprine 10 mg taken as needed as well as a very short prescription of Percocet for 7 days  Advised strict precautions of sedation with both his medication advised patient not taking at the same time  He voices agreement with plan and will follow up as needed for worsening or nonimprovement of his symptoms  Orders:    cyclobenzaprine (FLEXERIL) 10 mg tablet; Take 1 tablet (10 mg total) by mouth 3 (three) times a day as needed for muscle spasms    oxyCODONE-acetaminophen (Percocet) 5-325 mg per tablet; Take 1 tablet by mouth daily as needed for moderate pain Max Daily Amount: 1 tablet    Ambulatory Referral to Comprehensive Spine PT; Future    Cerebrovascular accident (CVA) due to embolism of middle cerebral artery,  "unspecified blood vessel laterality (HCC)  Currently taking aspirin  Does not endorse current at neurologic abnormality, does endorse that his vision has improved since being last seen by neurology  Is a routine follow-up with neurology, follow-up scheduled next  Continue to follow with neurology, continue aspirin, briefly discussed the value of statin and patient defers initiating this medication       Preventive Screenings:  - Diabetes Screening: screening up-to-date  - Colon cancer screening: screening up-to-date   - Lung cancer screening: screening not indicated     Immunizations:  - Immunizations due: Influenza and Prevnar 20         History of Present Illness     Adult Annual Physical:  Patient presents for annual physical. Pedrito Gaines is a 49 y.o. male  presenting for back pain and is also due for annual    vaccines, care gaps, screenings, routine labs, relevant lab work and imaging, reviewed at this visit.  Denies vaccination today.    **Note: Portions of the record may have been created with voice recognition software.  Occasional wrong word or \"sound alike\" substitutions may have occurred due to the inherent limitations of voice recognition software.  Please read the chart carefully and recognize, using context, where substitutions have occurred. Please contact for further clarification, when necessary.  .     Diet and Physical Activity:  - Diet/Nutrition: no special diet and low carb diet.  - Exercise: moderate cardiovascular exercise, 5-7 times a week on average and 1-2 hours on average.    General Health:  - Sleep: sleeps well and 7-8 hours of sleep on average.  - Hearing: normal hearing bilateral ears.  - Vision: vision problems.  - Dental: regular dental visits, brushes teeth once daily and floss regularly.     Health:  - History of STDs: no.   - Urinary symptoms: none.     Advanced Care Planning:  - Has an advanced directive?: no    - Has a durable medical POA?: no    - ACP document given to " patient?: no      Review of Systems   Constitutional:  Negative for chills and fever.   HENT:  Negative for ear pain and sore throat.    Eyes:  Negative for pain and visual disturbance.   Respiratory:  Negative for cough and shortness of breath.    Cardiovascular:  Negative for chest pain and palpitations.   Gastrointestinal:  Negative for abdominal pain and vomiting.   Genitourinary:  Negative for dysuria and hematuria.   Musculoskeletal:  Positive for arthralgias and back pain.   Skin:  Negative for color change and rash.   Neurological:  Negative for seizures and syncope.   All other systems reviewed and are negative.    Medical History Reviewed by provider this encounter:  Tobacco  Allergies  Meds  Problems  Med Hx  Surg Hx  Fam Hx     .  Past Medical History   Past Medical History:   Diagnosis Date    Environmental allergies     Migraines      Past Surgical History:   Procedure Laterality Date    CARDIAC CATHETERIZATION N/A 11/20/2024    Procedure: Cardiac pfo closure;  Surgeon: Alan Barrett DO;  Location: [facility];  Service: Cardiology    CLOSED MANIPULATION SHOULDER Left     HAND SURGERY Right     fracture with screw placement    WV REPAIR NASAL VESTIBULAR STENOSIS N/A 5/21/2020    Procedure: REPAIR VESTIBULAR STENOSIS;  Surgeon: Miguel Watkins MD;  Location: [facility];  Service: ENT    WV SEPTOPLASTY/SUBMUCOUS RESECJ W/WO CARTILAGE GRF N/A 5/21/2020    Procedure: SEPTOPLASTY;  Surgeon: Miguel Watkins MD;  Location[facility];  Service: ENT    WV SUBMUCOUS RESCJ INFERIOR TURBINATE PRTL/COMPL N/A 5/21/2020    Procedure: SUBMUCOSAL RESECTION OF TURBINATES WITH CADAVERIC RIB GRAFT;  Surgeon: Miguel Watkins MD;  Location[facility];  Service: ENT     Family History   Problem Relation Age of Onset    Arthritis Mother     Diabetes Mother       reports that he has quit smoking. He quit smokeless tobacco use about 9 years ago.  His smokeless tobacco use included chew. He reports  "current alcohol use. He reports that he does not use drugs.  Current Outpatient Medications   Medication Instructions    APPLE CIDER VINEGAR PO Take by mouth    aspirin 81 mg, Oral, Daily    Glutamine 500 MG CAPS 2 capsules, Daily    meloxicam (MOBIC) 15 mg, Oral, Daily    Multiple Vitamin (MULTIVITAMINS PO) 1 tablet, Daily     Allergies   Allergen Reactions    Hydrocodone Itching      Current Outpatient Medications on File Prior to Visit   Medication Sig Dispense Refill    APPLE CIDER VINEGAR PO Take by mouth      aspirin 81 mg chewable tablet Chew 1 tablet (81 mg total) daily 30 tablet 0    Glutamine 500 MG CAPS Take 2 capsules by mouth daily      meloxicam (MOBIC) 15 mg tablet take 1 tablet by mouth once daily 30 tablet 2    Multiple Vitamin (MULTIVITAMINS PO) Take 1 tablet by mouth daily      [DISCONTINUED] atorvastatin (LIPITOR) 40 mg tablet Take 1 tablet (40 mg total) by mouth every evening (Patient not taking: Reported on 12/18/2024) 30 tablet 11    [DISCONTINUED] clopidogrel (PLAVIX) 75 mg tablet Take 1 tablet (75 mg total) by mouth daily (Patient not taking: Reported on 12/18/2024) 30 tablet 2    [DISCONTINUED] Cyanocobalamin (VITAMIN B 12 PO) Take by mouth (Patient not taking: Reported on 2/12/2025)       No current facility-administered medications on file prior to visit.      Social History     Tobacco Use    Smoking status: Former    Smokeless tobacco: Former     Types: Chew     Quit date: 2016    Tobacco comments:     quit 2016   Vaping Use    Vaping status: Never Used   Substance and Sexual Activity    Alcohol use: Yes     Comment: occasional    Drug use: Never    Sexual activity: Not on file       Objective   Ht 5' 8\" (1.727 m)   BMI 34.36 kg/m²     Physical Exam  Vitals and nursing note reviewed.   Constitutional:       General: He is not in acute distress.     Appearance: He is well-developed.   HENT:      Head: Normocephalic and atraumatic.   Eyes:      Conjunctiva/sclera: Conjunctivae normal. "   Cardiovascular:      Rate and Rhythm: Normal rate and regular rhythm.      Heart sounds: No murmur heard.  Pulmonary:      Effort: Pulmonary effort is normal. No respiratory distress.      Breath sounds: Normal breath sounds.   Abdominal:      Palpations: Abdomen is soft.      Tenderness: There is no abdominal tenderness.   Musculoskeletal:         General: Tenderness (Mild but poorly localized lumbar region) and signs of injury (Back) present. No swelling or deformity.      Cervical back: Neck supple.   Skin:     General: Skin is warm and dry.      Capillary Refill: Capillary refill takes less than 2 seconds.   Neurological:      Mental Status: He is alert.   Psychiatric:         Mood and Affect: Mood normal.

## 2025-03-13 NOTE — PATIENT INSTRUCTIONS
"Patient Education     Routine physical for adults   The Basics   Written by the doctors and editors at [facility]   What is a physical? -- A physical is a routine visit, or \"check-up,\" with your doctor. You might also hear it called a \"wellness visit\" or \"preventive visit.\"  During each visit, the doctor will:   Ask about your physical and mental health   Ask about your habits, behaviors, and lifestyle   Do an exam   Give you vaccines if needed   Talk to you about any medicines you take   Give advice about your health   Answer your questions  Getting regular check-ups is an important part of taking care of your health. It can help your doctor find and treat any problems you have. But it's also important for preventing health problems.  A routine physical is different from a \"sick visit.\" A sick visit is when you see a doctor because of a health concern or problem. Since physicals are scheduled ahead of time, you can think about what you want to ask the doctor.  How often should I get a physical? -- It depends on your age and health. In general, for people age 21 years and older:   If you are younger than 50 years, you might be able to get a physical every 3 years.   If you are 50 years or older, your doctor might recommend a physical every year.  If you have an ongoing health condition, like diabetes or high blood pressure, your doctor will probably want to see you more often.  What happens during a physical? -- In general, each visit will include:   Physical exam - The doctor or nurse will check your height, weight, heart rate, and blood pressure. They will also look at your eyes and ears. They will ask about how you are feeling and whether you have any symptoms that bother you.   Medicines - It's a good idea to bring a list of all the medicines you take to each doctor visit. Your doctor will talk to you about your medicines and answer any questions. Tell them if you are having any side effects that bother you. You " "should also tell them if you are having trouble paying for any of your medicines.   Habits and behaviors - This includes:   Your diet   Your exercise habits   Whether you smoke, drink alcohol, or use drugs   Whether you are sexually active   Whether you feel safe at home  Your doctor will talk to you about things you can do to improve your health and lower your risk of health problems. They will also offer help and support. For example, if you want to quit smoking, they can give you advice and might prescribe medicines. If you want to improve your diet or get more physical activity, they can help you with this, too.   Lab tests, if needed - The tests you get will depend on your age and situation. For example, your doctor might want to check your:   Cholesterol   Blood sugar   Iron level   Vaccines - The recommended vaccines will depend on your age, health, and what vaccines you already had. Vaccines are very important because they can prevent certain serious or deadly infections.   Discussion of screening - \"Screening\" means checking for diseases or other health problems before they cause symptoms. Your doctor can recommend screening based on your age, risk, and preferences. This might include tests to check for:   Cancer, such as breast, prostate, cervical, ovarian, colorectal, prostate, lung, or skin cancer   Sexually transmitted infections, such as chlamydia and gonorrhea   Mental health conditions like depression and anxiety  Your doctor will talk to you about the different types of screening tests. They can help you decide which screenings to have. They can also explain what the results might mean.   Answering questions - The physical is a good time to ask the doctor or nurse questions about your health. If needed, they can refer you to other doctors or specialists, too.  Adults older than 65 years often need other care, too. As you get older, your doctor will talk to you about:   How to prevent falling at " home   Hearing or vision tests   Memory testing   How to take your medicines safely   Making sure that you have the help and support you need at home  All topics are updated as new evidence becomes available and our peer review process is complete.  This topic retrieved from [employer] on: May 02, 2024.  Topic 970266 Version 1.0  Release: 32.4.3 - C32.122  © 2024 UpToDate, Inc. and/or its affiliates. All rights reserved.  Consumer Information Use and Disclaimer   Disclaimer: This generalized information is a limited summary of diagnosis, treatment, and/or medication information. It is not meant to be comprehensive and should be used as a tool to help the user understand and/or assess potential diagnostic and treatment options. It does NOT include all information about conditions, treatments, medications, side effects, or risks that may apply to a specific patient. It is not intended to be medical advice or a substitute for the medical advice, diagnosis, or treatment of a health care provider based on the health care provider's examination and assessment of a patient's specific and unique circumstances. Patients must speak with a health care provider for complete information about their health, medical questions, and treatment options, including any risks or benefits regarding use of medications. This information does not endorse any treatments or medications as safe, effective, or approved for treating a specific patient. UpToDate, Inc. and its affiliates disclaim any warranty or liability relating to this information or the use thereof.The use of this information is governed by the Terms of Use, available at https://www.woltersResumesimo.comuwer.com/en/know/clinical-effectiveness-terms. 2024© UpToDate, Inc. and its affiliates and/or licensors. All rights reserved.  Copyright   © 2024 UpToDate, Inc. and/or its affiliates. All rights reserved.

## 2025-03-13 NOTE — ASSESSMENT & PLAN NOTE
Currently taking aspirin  Does not endorse current at neurologic abnormality, does endorse that his vision has improved since being last seen by neurology  Is a routine follow-up with neurology, follow-up scheduled next  Continue to follow with neurology, continue aspirin, briefly discussed the value of statin and patient defers initiating this medication

## 2025-03-14 ENCOUNTER — TELEPHONE (OUTPATIENT)
Age: 50
End: 2025-03-14

## 2025-03-14 NOTE — TELEPHONE ENCOUNTER
Patient's wife states she spoke with provider yesterday and was told if his pain wasn't any better imaging could be ordered. She is wondering if those orders can be placed. She requests a call back to advise.

## 2025-03-14 NOTE — TELEPHONE ENCOUNTER
Advise continued monitoring at this time, if no improvement over 1 week period can then consider imaging.  As discussed, suspect muscular cause and imaging of the low back often is unrevealing and does not change treatment.  Is only in cases where back pain is sustained for a longer period without improvement with medications we will reconsider imaging.

## 2025-03-18 DIAGNOSIS — M54.50 ACUTE LOW BACK PAIN, UNSPECIFIED BACK PAIN LATERALITY, UNSPECIFIED WHETHER SCIATICA PRESENT: Primary | ICD-10-CM

## 2025-03-18 RX ORDER — METHYLPREDNISOLONE 4 MG/1
TABLET ORAL
Qty: 21 EACH | Refills: 0 | Status: SHIPPED | OUTPATIENT
Start: 2025-03-18

## 2025-05-30 DIAGNOSIS — M19.032 DRUJ (DISTAL RADIOULNAR JOINT) ARTHROSIS, PRIMARY, LEFT: Primary | ICD-10-CM

## 2025-05-30 DIAGNOSIS — Q74.0 CONGENITAL POSITIVE ULNAR VARIANCE OF LEFT WRIST: ICD-10-CM

## 2025-05-30 PROCEDURE — 20605 DRAIN/INJ JOINT/BURSA W/O US: CPT | Performed by: ORTHOPAEDIC SURGERY

## 2025-05-30 PROCEDURE — 99214 OFFICE O/P EST MOD 30 MIN: CPT | Performed by: ORTHOPAEDIC SURGERY

## 2025-05-30 RX ORDER — BETAMETHASONE SODIUM PHOSPHATE AND BETAMETHASONE ACETATE 3; 3 MG/ML; MG/ML
6 INJECTION, SUSPENSION INTRA-ARTICULAR; INTRALESIONAL; INTRAMUSCULAR; SOFT TISSUE
Status: COMPLETED | OUTPATIENT
Start: 2025-05-30 | End: 2025-05-30

## 2025-05-30 RX ORDER — SODIUM CHLORIDE, SODIUM LACTATE, POTASSIUM CHLORIDE, CALCIUM CHLORIDE 600; 310; 30; 20 MG/100ML; MG/100ML; MG/100ML; MG/100ML
20 INJECTION, SOLUTION INTRAVENOUS CONTINUOUS
OUTPATIENT
Start: 2025-05-30

## 2025-05-30 RX ORDER — ACETAMINOPHEN 325 MG/1
975 TABLET ORAL ONCE
OUTPATIENT
Start: 2025-05-30 | End: 2025-05-30

## 2025-05-30 RX ORDER — LIDOCAINE HYDROCHLORIDE 5 MG/ML
1 INJECTION, SOLUTION INFILTRATION; PERINEURAL
Status: COMPLETED | OUTPATIENT
Start: 2025-05-30 | End: 2025-05-30

## 2025-05-30 RX ADMIN — LIDOCAINE HYDROCHLORIDE 1 ML: 5 INJECTION, SOLUTION INFILTRATION; PERINEURAL at 15:15

## 2025-05-30 RX ADMIN — BETAMETHASONE SODIUM PHOSPHATE AND BETAMETHASONE ACETATE 6 MG: 3; 3 INJECTION, SUSPENSION INTRA-ARTICULAR; INTRALESIONAL; INTRAMUSCULAR; SOFT TISSUE at 15:15

## 2025-05-30 NOTE — H&P
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  He has failed conservative management with corticosteroid injection, rest, ice, therapy, over-the-counter anti-inflammatories.  Symptoms are affecting his quality of life and ability to work. At this time he would like to pursue surgical options. We discussed Darrach, hemiulna resection, DRUJ arthroplasty and SK procedure. I think he is best a candidate for a Sauvé Kapandji procedure.  We reviewed the risks of surgery including infection, bleeding, injury to nearby structures including the nerve, poor wound healing, non union, malunion, hardware failure/irritation, need for additional surgery, stiffness, bone regrowth, CRPS, and incomplete resolution or recurrence of symptoms. We reviewed the details of the procedure and the anticipated recovery period. All questions answered to patient's satisfaction. Written consent obtained in the office today.      He will be scheduled in November.  Will follow-up after surgery.      Orders:    Case request operating room: ARTHRODESIS of Left distal radioulnar joint (Cheo-Kapandji Procedure), with possible bone autograft from olecranon; Standing    Medium joint arthrocentesis: L distal radioulnar        It is recommended he return to the office after surgery    he expressed understanding of the plan and agreed. We encouraged them to contact our office with any questions or concerns.       Chief Complaint:     Left wrist pain    Previous History:   Previously seen on 2/7/2025. At that point he did not wish to proceed with surgery and elected to undergo a corticosteroid injection to the left wrist.      Interval History:  Since the last visit he did not get much relief with his left wrist corticosteroid injection.  He  scheduled the appointment to discuss surgical options today.  His symptoms are unchanged from his previous visit.  He denies any new injury.  He denies any numbness or tingling.    Past Medical History:  Past Medical History[1]  Past Surgical History[2]  Family History[3]  Social History     Socioeconomic History    Marital status: /Civil Union     Spouse name: Not on file    Number of children: Not on file    Years of education: Not on file    Highest education level: Not on file   Occupational History    Not on file   Tobacco Use    Smoking status: Former    Smokeless tobacco: Former     Types: Chew     Quit date: 2016    Tobacco comments:     quit 2016   Vaping Use    Vaping status: Never Used   Substance and Sexual Activity    Alcohol use: Yes     Comment: occasional    Drug use: Never    Sexual activity: Not on file   Other Topics Concern    Not on file   Social History Narrative    Consumes 2 cups of coffee per day     Social Drivers of Health     Financial Resource Strain: Not on file   Food Insecurity: No Food Insecurity (3/13/2025)    Nursing - Inadequate Food Risk Classification     Worried About Running Out of Food in the Last Year: Never true     Ran Out of Food in the Last Year: Never true     Ran Out of Food in the Last Year: Never true   Transportation Needs: No Transportation Needs (3/13/2025)    PRAPARE - Transportation     Lack of Transportation (Medical): No     Lack of Transportation (Non-Medical): No   Physical Activity: Not on file   Stress: Not on file   Social Connections: Not on file   Intimate Partner Violence: Unknown (10/22/2024)    Nursing IPS     Feels Physically and Emotionally Safe: Not on file     Physically Hurt by Someone: Not on file     Humiliated or Emotionally Abused by Someone: Not on file     Physically Hurt by Someone: No     Hurt or Threatened by Someone: No   Housing Stability: Low Risk  (3/13/2025)    Housing Stability Vital Sign     Unable to Pay for Housing in  the Last Year: No     Number of Times Moved in the Last Year: 0     Homeless in the Last Year: No     Scheduled Meds:  Continuous Infusions:No current facility-administered medications for this visit.    PRN Meds:.  Allergies[4]    Physical Examination:    There were no vitals taken for this visit.    Gen: A&Ox3, NAD  Cardiac: regular rate  Chest: non labored breathing  Abdomen: Non-distended    Right Upper Extremity:  Skin CDI  No obvious deformity of the shoulder, arm, elbow, forearm, wrist, hand  Sensation intact to light touch in the axillary median, ulnar, and radial nerve distributions  Warm, well-perfused digits        Left Upper Extremity:  Skin CDI  No obvious deformity of the shoulder, arm, elbow, forearm, wrist, hand  Swelling Negative  TTP over DRUJ  Non-tender ulnar styloid, TFCC fovea  Sensation intact to light touch in the axillary median, ulnar, and radial nerve distributions  Full digital and wrist ROM without pain  Warm, well-perfused digits  Cap refill <2s      Studies:  No new imaging.      Labs:  I personally reviewed the following labs,   Lab Results   Component Value Date    HGBA1C 5.1 10/23/2024       Medium joint arthrocentesis: L distal radioulnar    Performed by: Cisco Guzman MD  Authorized by: Cisco Guzman MD    Universal Protocol:  Consent: Verbal consent obtained  Risks and benefits: risks, benefits and alternatives were discussed  Timeout called at: 5/30/2025 4:09 PM.  Procedure Details  Location: wrist - L distal radioulnar  Needle size: 25 G  Ultrasound guidance: no  Medications administered: 1 mL lidocaine 0.5 %; 6 mg betamethasone acetate-betamethasone sodium phosphate 6 (3-3) mg/mL    Patient tolerance: patient tolerated the procedure well with no immediate complications  Dressing:  Sterile dressing applied                Cisco Guzman MD  Hand and Upper Extremity Surgery      *This note was dictated using Dragon voice recognition software. Please excuse  any word substitutions or errors.*       Scribe Attestation      I,:  Manuel Dowling MD am acting as a scribe while in the presence of the attending physician.:       I,:  Cisco Guzman MD personally performed the services described in this documentation    as scribed in my presence.:                    [1]   Past Medical History:  Diagnosis Date    Environmental allergies     Migraines    [2]   Past Surgical History:  Procedure Laterality Date    CARDIAC CATHETERIZATION N/A 11/20/2024    Procedure: Cardiac pfo closure;  Surgeon: Alan Barrett DO;  [facility];  Service: Cardiology    CLOSED MANIPULATION SHOULDER Left     HAND SURGERY Right     fracture with screw placement    LA REPAIR NASAL VESTIBULAR STENOSIS N/A 5/21/2020    Procedure: REPAIR VESTIBULAR STENOSIS;  Surgeon: Miguel Watkins MD;  [facility];  Service: ENT    LA SEPTOPLASTY/SUBMUCOUS RESECJ W/WO CARTILAGE GRF N/A 5/21/2020    Procedure: SEPTOPLASTY;  Surgeon: Miguel Watkins MD;  [facility];  Service: ENT    LA SUBMUCOUS RESCJ INFERIOR TURBINATE PRTL/COMPL N/A 5/21/2020    Procedure: SUBMUCOSAL RESECTION OF TURBINATES WITH CADAVERIC RIB GRAFT;  Surgeon: Miguel Watkins MD;  [facility];  Service: ENT   [3]   Family History  Problem Relation Name Age of Onset    Arthritis Mother      Diabetes Mother     [4]   Allergies  Allergen Reactions    Hydrocodone Itching

## 2025-05-30 NOTE — PROGRESS NOTES
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  He has failed conservative management with corticosteroid injection, rest, ice, therapy, over-the-counter anti-inflammatories.  Symptoms are affecting his quality of life and ability to work. At this time he would like to pursue surgical options. We discussed Darrach, hemiulna resection, DRUJ arthroplasty and SK procedure. I think he is best a candidate for a Sauvé Kapandji procedure.  We reviewed the risks of surgery including infection, bleeding, injury to nearby structures including the nerve, poor wound healing, non union, malunion, hardware failure/irritation, need for additional surgery, stiffness, bone regrowth, CRPS, and incomplete resolution or recurrence of symptoms. We reviewed the details of the procedure and the anticipated recovery period. All questions answered to patient's satisfaction. Written consent obtained in the office today.      He will be scheduled in November.  Will follow-up after surgery.      Orders:    Case request operating room: ARTHRODESIS of Left distal radioulnar joint (Cheo-Kapandji Procedure), with possible bone autograft from olecranon; Standing    Medium joint arthrocentesis: L distal radioulnar        It is recommended he return to the office after surgery    he expressed understanding of the plan and agreed. We encouraged them to contact our office with any questions or concerns.       Chief Complaint:     Left wrist pain    Previous History:   Previously seen on 2/7/2025. At that point he did not wish to proceed with surgery and elected to undergo a corticosteroid injection to the left wrist.      Interval History:  Since the last visit he did not get much relief with his left wrist corticosteroid injection.  He  scheduled the appointment to discuss surgical options today.  His symptoms are unchanged from his previous visit.  He denies any new injury.  He denies any numbness or tingling.    Past Medical History:  Past Medical History[1]  Past Surgical History[2]  Family History[3]  Social History     Socioeconomic History    Marital status: /Civil Union     Spouse name: Not on file    Number of children: Not on file    Years of education: Not on file    Highest education level: Not on file   Occupational History    Not on file   Tobacco Use    Smoking status: Former    Smokeless tobacco: Former     Types: Chew     Quit date: 2016    Tobacco comments:     quit 2016   Vaping Use    Vaping status: Never Used   Substance and Sexual Activity    Alcohol use: Yes     Comment: occasional    Drug use: Never    Sexual activity: Not on file   Other Topics Concern    Not on file   Social History Narrative    Consumes 2 cups of coffee per day     Social Drivers of Health     Financial Resource Strain: Not on file   Food Insecurity: No Food Insecurity (3/13/2025)    Nursing - Inadequate Food Risk Classification     Worried About Running Out of Food in the Last Year: Never true     Ran Out of Food in the Last Year: Never true     Ran Out of Food in the Last Year: Never true   Transportation Needs: No Transportation Needs (3/13/2025)    PRAPARE - Transportation     Lack of Transportation (Medical): No     Lack of Transportation (Non-Medical): No   Physical Activity: Not on file   Stress: Not on file   Social Connections: Not on file   Intimate Partner Violence: Unknown (10/22/2024)    Nursing IPS     Feels Physically and Emotionally Safe: Not on file     Physically Hurt by Someone: Not on file     Humiliated or Emotionally Abused by Someone: Not on file     Physically Hurt by Someone: No     Hurt or Threatened by Someone: No   Housing Stability: Low Risk  (3/13/2025)    Housing Stability Vital Sign     Unable to Pay for Housing in  the Last Year: No     Number of Times Moved in the Last Year: 0     Homeless in the Last Year: No     Scheduled Meds:  Continuous Infusions:No current facility-administered medications for this visit.    PRN Meds:.  Allergies[4]    Physical Examination:    There were no vitals taken for this visit.    Gen: A&Ox3, NAD  Cardiac: regular rate  Chest: non labored breathing  Abdomen: Non-distended    Right Upper Extremity:  Skin CDI  No obvious deformity of the shoulder, arm, elbow, forearm, wrist, hand  Sensation intact to light touch in the axillary median, ulnar, and radial nerve distributions  Warm, well-perfused digits        Left Upper Extremity:  Skin CDI  No obvious deformity of the shoulder, arm, elbow, forearm, wrist, hand  Swelling Negative  TTP over DRUJ  Non-tender ulnar styloid, TFCC fovea  Sensation intact to light touch in the axillary median, ulnar, and radial nerve distributions  Full digital and wrist ROM without pain  Warm, well-perfused digits  Cap refill <2s      Studies:  No new imaging.      Labs:  I personally reviewed the following labs,   Lab Results   Component Value Date    HGBA1C 5.1 10/23/2024       Medium joint arthrocentesis: L distal radioulnar    Performed by: Cisco Guzman MD  Authorized by: Cisco Guzman MD    Universal Protocol:  Consent: Verbal consent obtained  Risks and benefits: risks, benefits and alternatives were discussed  Timeout called at: 5/30/2025 4:09 PM.  Procedure Details  Location: wrist - L distal radioulnar  Needle size: 25 G  Ultrasound guidance: no  Medications administered: 1 mL lidocaine 0.5 %; 6 mg betamethasone acetate-betamethasone sodium phosphate 6 (3-3) mg/mL    Patient tolerance: patient tolerated the procedure well with no immediate complications  Dressing:  Sterile dressing applied                Cisco Guzman MD  Hand and Upper Extremity Surgery      *This note was dictated using Dragon voice recognition software. Please excuse  any word substitutions or errors.*       Scribe Attestation      I,:  Manuel Dowling MD am acting as a scribe while in the presence of the attending physician.:       I,:  Cisco Guzman MD personally performed the services described in this documentation    as scribed in my presence.:                  [1]   Past Medical History:  Diagnosis Date    Environmental allergies     Migraines    [2]   Past Surgical History:  Procedure Laterality Date    CARDIAC CATHETERIZATION N/A 11/20/2024    Procedure: Cardiac pfo closure;  Surgeon: Alan Barrett DO;  [facility];  Service: Cardiology    CLOSED MANIPULATION SHOULDER Left     HAND SURGERY Right     fracture with screw placement    OH REPAIR NASAL VESTIBULAR STENOSIS N/A 5/21/2020    Procedure: REPAIR VESTIBULAR STENOSIS;  Surgeon: Miguel Watkins MD;  [facility];  Service: ENT    OH SEPTOPLASTY/SUBMUCOUS RESECJ W/WO CARTILAGE GRF N/A 5/21/2020    Procedure: SEPTOPLASTY;  Surgeon: Miguel Watkins MD;  [facility];  Service: ENT    OH SUBMUCOUS RESCJ INFERIOR TURBINATE PRTL/COMPL N/A 5/21/2020    Procedure: SUBMUCOSAL RESECTION OF TURBINATES WITH CADAVERIC RIB GRAFT;  Surgeon: Miguel Watkins MD;  [facility];  Service: ENT   [3]   Family History  Problem Relation Name Age of Onset    Arthritis Mother      Diabetes Mother     [4]   Allergies  Allergen Reactions    Hydrocodone Itching

## 2025-06-19 ENCOUNTER — TELEPHONE (OUTPATIENT)
Dept: NEUROLOGY | Facility: CLINIC | Age: 50
End: 2025-06-19

## 2025-06-19 NOTE — TELEPHONE ENCOUNTER
Left PT VM stating Dr. Calderón wont be in office on 6/25 so a reschedule is needed. Left call back number.

## (undated) DEVICE — UNDYED MONOFILAMENT (POLYDIOXANONE), ABSORBABLE SYNTHETIC SURGICAL SUTURE: Brand: PDS

## (undated) DEVICE — POV-IOD SOLUTION 4OZ BT

## (undated) DEVICE — CATH ULTRASOUND ACUNAV ICE 8FR 90CM GE VIVID-I

## (undated) DEVICE — SKIN MARKER DUAL TIP WITH RULER CAP, FLEXIBLE RULER AND LABELS: Brand: DEVON

## (undated) DEVICE — 3M™ STERI-STRIP™ COMPOUND BENZOIN TINCTURE 40 BAGS/CARTON 4 CARTONS/CASE C1544: Brand: 3M™ STERI-STRIP™

## (undated) DEVICE — PENCIL ELECTROSURG E-Z CLEAN -0035H

## (undated) DEVICE — ELECTRODE NEEDLE MEGAFINE 2IN E-Z CLEAN MEGADYNE -0118

## (undated) DEVICE — STRL COTTON TIP APPLCTR 6IN PK: Brand: CARDINAL HEALTH

## (undated) DEVICE — INTRO SHEATH 9FR 24CM ARROW-FLEX

## (undated) DEVICE — ELECTRODE BLADE MOD E-Z CLEAN  2.75IN 7CM -0012AM

## (undated) DEVICE — DGW .035 FC J3MM 150CM TEF: Brand: EMERALD

## (undated) DEVICE — SUT CHROMIC 5-0 P-3 18 IN 687G

## (undated) DEVICE — SUT PLAIN 4-0 SC-1 18 IN 1828H

## (undated) DEVICE — CATH DIAG 5FR IMPULSE 100CM MPA-1

## (undated) DEVICE — Device

## (undated) DEVICE — NEURO PATTIES 1/2 X 3

## (undated) DEVICE — SUT ETHILON 4-0 PS-2 18 IN 1667H

## (undated) DEVICE — PINNACLE INTRODUCER SHEATH: Brand: PINNACLE

## (undated) DEVICE — PAD GROUNDING ADULT

## (undated) DEVICE — GLOVE INDICATOR PI UNDERGLOVE SZ 7.5 BLUE

## (undated) DEVICE — GLOVE SRG BIOGEL 7.5

## (undated) DEVICE — STERILE BETHLEHEM NASAL PACK: Brand: CARDINAL HEALTH

## (undated) DEVICE — MINI BLADE ROUND TIP SHARP ON ONE SIDE

## (undated) DEVICE — NEEDLE 27 G X 1 1/4

## (undated) DEVICE — IV CATH 18 G X 1.16 IN

## (undated) DEVICE — AMPLATZ EXTRA STIFF WIRE GUIDE: Brand: AMPLATZ

## (undated) DEVICE — GAUZE SPONGES,16 PLY: Brand: CURITY

## (undated) DEVICE — SPLINT 1524050 5PK PAIR DOYLE II AIRWAY: Brand: DOYLE II ™

## (undated) DEVICE — ROSEN CURVED WIRE GUIDE: Brand: ROSEN

## (undated) DEVICE — 3M™ STERI-STRIP™ REINFORCED ADHESIVE SKIN CLOSURES, R1547, 1/2 IN X 4 IN (12 MM X 100 MM), 6 STRIPS/ENVELOPE: Brand: 3M™ STERI-STRIP™

## (undated) DEVICE — SUT PROLENE 5-0 P-3 18 IN 8698G

## (undated) DEVICE — SUT PROLENE 6-0 P-3 18 IN 8695G

## (undated) DEVICE — SHEATH DELIVERY AMPLATZER TALISMANTM 9FR 45 X 80

## (undated) DEVICE — INTENDED FOR TISSUE SEPARATION, AND OTHER PROCEDURES THAT REQUIRE A SHARP SURGICAL BLADE TO PUNCTURE OR CUT.: Brand: BARD-PARKER ® CARBON RIB-BACK BLADES

## (undated) DEVICE — BASIC SINGLE BASIN 2-LF: Brand: MEDLINE INDUSTRIES, INC.

## (undated) DEVICE — SYRINGE 10ML LL CONTROL TOP

## (undated) DEVICE — SPECIMEN CONTAINER STERILE PEEL PACK